# Patient Record
Sex: MALE | Race: WHITE | NOT HISPANIC OR LATINO | Employment: FULL TIME | URBAN - METROPOLITAN AREA
[De-identification: names, ages, dates, MRNs, and addresses within clinical notes are randomized per-mention and may not be internally consistent; named-entity substitution may affect disease eponyms.]

---

## 2024-01-01 ENCOUNTER — HOSPITAL ENCOUNTER (INPATIENT)
Facility: HOSPITAL | Age: 39
LOS: 2 days | DRG: 432 | End: 2024-11-23
Attending: INTERNAL MEDICINE | Admitting: INTERNAL MEDICINE
Payer: COMMERCIAL

## 2024-01-01 ENCOUNTER — HOSPITAL ENCOUNTER (INPATIENT)
Facility: HOSPITAL | Age: 39
LOS: 4 days | Discharge: OTHER INSTITUTION | DRG: 432 | End: 2024-11-21
Attending: EMERGENCY MEDICINE | Admitting: EMERGENCY MEDICINE
Payer: COMMERCIAL

## 2024-01-01 ENCOUNTER — DOCUMENTATION ONLY (OUTPATIENT)
Dept: CARDIOLOGY | Facility: CLINIC | Age: 39
End: 2024-01-01
Payer: COMMERCIAL

## 2024-01-01 VITALS
HEART RATE: 68 BPM | OXYGEN SATURATION: 92 % | RESPIRATION RATE: 53 BRPM | SYSTOLIC BLOOD PRESSURE: 129 MMHG | WEIGHT: 315 LBS | BODY MASS INDEX: 45.1 KG/M2 | DIASTOLIC BLOOD PRESSURE: 60 MMHG | HEIGHT: 70 IN | TEMPERATURE: 100 F

## 2024-01-01 VITALS
WEIGHT: 315 LBS | RESPIRATION RATE: 32 BRPM | DIASTOLIC BLOOD PRESSURE: 67 MMHG | TEMPERATURE: 99 F | HEIGHT: 70 IN | HEART RATE: 109 BPM | SYSTOLIC BLOOD PRESSURE: 146 MMHG | BODY MASS INDEX: 45.1 KG/M2 | OXYGEN SATURATION: 97 %

## 2024-01-01 DIAGNOSIS — N17.9 AKI (ACUTE KIDNEY INJURY): ICD-10-CM

## 2024-01-01 DIAGNOSIS — K72.90 LIVER FAILURE: ICD-10-CM

## 2024-01-01 DIAGNOSIS — R07.9 CHEST PAIN: ICD-10-CM

## 2024-01-01 DIAGNOSIS — K74.3 HEPATIC CIRRHOSIS DUE TO PRIMARY BILIARY CHOLANGITIS: Chronic | ICD-10-CM

## 2024-01-01 DIAGNOSIS — K70.30 ALCOHOLIC CIRRHOSIS: ICD-10-CM

## 2024-01-01 DIAGNOSIS — R79.89 ELEVATED BRAIN NATRIURETIC PEPTIDE (BNP) LEVEL: ICD-10-CM

## 2024-01-01 DIAGNOSIS — K74.3 HEPATIC CIRRHOSIS DUE TO PRIMARY BILIARY CHOLANGITIS: Primary | Chronic | ICD-10-CM

## 2024-01-01 DIAGNOSIS — K76.82 ACUTE HEPATIC ENCEPHALOPATHY: Primary | ICD-10-CM

## 2024-01-01 LAB
ABO + RH BLD: NORMAL
ADENOVIRUS: NOT DETECTED
ALBUMIN SERPL BCP-MCNC: 1.9 G/DL (ref 3.5–5.2)
ALBUMIN SERPL BCP-MCNC: 2 G/DL (ref 3.5–5.2)
ALBUMIN SERPL BCP-MCNC: 2 G/DL (ref 3.5–5.2)
ALBUMIN SERPL BCP-MCNC: 2.1 G/DL (ref 3.5–5.2)
ALBUMIN SERPL BCP-MCNC: 2.1 G/DL (ref 3.5–5.2)
ALBUMIN SERPL BCP-MCNC: 2.2 G/DL (ref 3.5–5.2)
ALBUMIN SERPL BCP-MCNC: 2.2 G/DL (ref 3.5–5.2)
ALBUMIN SERPL BCP-MCNC: 2.3 G/DL (ref 3.5–5.2)
ALLENS TEST: ABNORMAL
ALP SERPL-CCNC: 187 U/L (ref 40–150)
ALP SERPL-CCNC: 204 U/L (ref 40–150)
ALP SERPL-CCNC: 211 U/L (ref 40–150)
ALP SERPL-CCNC: 212 U/L (ref 40–150)
ALP SERPL-CCNC: 215 U/L (ref 40–150)
ALP SERPL-CCNC: 216 U/L (ref 40–150)
ALP SERPL-CCNC: 227 U/L (ref 40–150)
ALT SERPL W/O P-5'-P-CCNC: 104 U/L (ref 10–44)
ALT SERPL W/O P-5'-P-CCNC: 440 U/L (ref 10–44)
ALT SERPL W/O P-5'-P-CCNC: 55 U/L (ref 10–44)
ALT SERPL W/O P-5'-P-CCNC: 59 U/L (ref 10–44)
ALT SERPL W/O P-5'-P-CCNC: 62 U/L (ref 10–44)
ALT SERPL W/O P-5'-P-CCNC: 63 U/L (ref 10–44)
ALT SERPL W/O P-5'-P-CCNC: 64 U/L (ref 10–44)
AMMONIA PLAS-SCNC: 106 UMOL/L (ref 10–50)
AMMONIA PLAS-SCNC: 57 UMOL/L (ref 10–50)
AMMONIA PLAS-SCNC: 57 UMOL/L (ref 10–50)
AMMONIA PLAS-SCNC: 83 UMOL/L (ref 10–50)
ANION GAP SERPL CALC-SCNC: 12 MMOL/L (ref 8–16)
ANION GAP SERPL CALC-SCNC: 13 MMOL/L (ref 8–16)
ANION GAP SERPL CALC-SCNC: 14 MMOL/L (ref 8–16)
ANION GAP SERPL CALC-SCNC: 14 MMOL/L (ref 8–16)
ANION GAP SERPL CALC-SCNC: 15 MMOL/L (ref 8–16)
ANION GAP SERPL CALC-SCNC: 15 MMOL/L (ref 8–16)
ANION GAP SERPL CALC-SCNC: 16 MMOL/L (ref 8–16)
ANION GAP SERPL CALC-SCNC: 16 MMOL/L (ref 8–16)
ANISOCYTOSIS BLD QL SMEAR: SLIGHT
AORTIC ROOT ANNULUS: 3.57 CM
APTT PPP: 39.3 SEC (ref 21–32)
ASCENDING AORTA: 3.12 CM
AST SERPL-CCNC: 133 U/L (ref 10–40)
AST SERPL-CCNC: 154 U/L (ref 10–40)
AST SERPL-CCNC: 155 U/L (ref 10–40)
AST SERPL-CCNC: 158 U/L (ref 10–40)
AST SERPL-CCNC: 158 U/L (ref 10–40)
AST SERPL-CCNC: 2137 U/L (ref 10–40)
AST SERPL-CCNC: 314 U/L (ref 10–40)
AV INDEX (PROSTH): 0.88
AV MEAN GRADIENT: 11.4 MMHG
AV PEAK GRADIENT: 19.4 MMHG
AV VALVE AREA BY VELOCITY RATIO: 2.7 CM²
AV VALVE AREA: 2.8 CM²
AV VELOCITY RATIO: 0.86
BACTERIA #/AREA URNS AUTO: ABNORMAL /HPF
BACTERIA #/AREA URNS HPF: ABNORMAL /HPF
BASO STIPL BLD QL SMEAR: ABNORMAL
BASOPHILS # BLD AUTO: 0.02 K/UL (ref 0–0.2)
BASOPHILS # BLD AUTO: 0.02 K/UL (ref 0–0.2)
BASOPHILS # BLD AUTO: 0.03 K/UL (ref 0–0.2)
BASOPHILS # BLD AUTO: 0.05 K/UL (ref 0–0.2)
BASOPHILS # BLD AUTO: 0.05 K/UL (ref 0–0.2)
BASOPHILS NFR BLD: 0 % (ref 0–1.9)
BASOPHILS NFR BLD: 0 % (ref 0–1.9)
BASOPHILS NFR BLD: 0.2 % (ref 0–1.9)
BASOPHILS NFR BLD: 0.3 % (ref 0–1.9)
BASOPHILS NFR BLD: 0.4 % (ref 0–1.9)
BASOPHILS NFR BLD: 0.5 % (ref 0–1.9)
BASOPHILS NFR BLD: 0.5 % (ref 0–1.9)
BILIRUB DIRECT SERPL-MCNC: >14 MG/DL (ref 0.1–0.3)
BILIRUB SERPL-MCNC: 19.8 MG/DL (ref 0.1–1)
BILIRUB SERPL-MCNC: 21.3 MG/DL (ref 0.1–1)
BILIRUB SERPL-MCNC: 21.5 MG/DL (ref 0.1–1)
BILIRUB SERPL-MCNC: 22.7 MG/DL (ref 0.1–1)
BILIRUB SERPL-MCNC: 23.8 MG/DL (ref 0.1–1)
BILIRUB SERPL-MCNC: 24.3 MG/DL (ref 0.1–1)
BILIRUB SERPL-MCNC: 24.4 MG/DL (ref 0.1–1)
BILIRUB UR QL STRIP: ABNORMAL
BILIRUB UR QL STRIP: ABNORMAL
BLD GP AB SCN CELLS X3 SERPL QL: NORMAL
BNP SERPL-MCNC: 771 PG/ML (ref 0–99)
BORDETELLA PARAPERTUSSIS (IS1001): NOT DETECTED
BORDETELLA PERTUSSIS (PTXP): NOT DETECTED
BSA FOR ECHO PROCEDURE: 2.92 M2
BUN SERPL-MCNC: 12 MG/DL (ref 6–20)
BUN SERPL-MCNC: 13 MG/DL (ref 6–20)
BUN SERPL-MCNC: 19 MG/DL (ref 6–20)
BUN SERPL-MCNC: 23 MG/DL (ref 6–20)
BUN SERPL-MCNC: 24 MG/DL (ref 6–20)
BUN SERPL-MCNC: 28 MG/DL (ref 6–20)
BUN SERPL-MCNC: 32 MG/DL (ref 6–20)
BUN SERPL-MCNC: 38 MG/DL (ref 6–20)
BURR CELLS BLD QL SMEAR: ABNORMAL
BURR CELLS BLD QL SMEAR: ABNORMAL
CALCIUM SERPL-MCNC: 7.6 MG/DL (ref 8.7–10.5)
CALCIUM SERPL-MCNC: 8.3 MG/DL (ref 8.7–10.5)
CALCIUM SERPL-MCNC: 8.3 MG/DL (ref 8.7–10.5)
CALCIUM SERPL-MCNC: 8.4 MG/DL (ref 8.7–10.5)
CALCIUM SERPL-MCNC: 8.5 MG/DL (ref 8.7–10.5)
CALCIUM SERPL-MCNC: 8.6 MG/DL (ref 8.7–10.5)
CHLAMYDIA PNEUMONIAE: NOT DETECTED
CHLORIDE SERPL-SCNC: 82 MMOL/L (ref 95–110)
CHLORIDE SERPL-SCNC: 83 MMOL/L (ref 95–110)
CHLORIDE SERPL-SCNC: 85 MMOL/L (ref 95–110)
CK SERPL-CCNC: 312 U/L (ref 20–200)
CLARITY UR REFRACT.AUTO: ABNORMAL
CLARITY UR: ABNORMAL
CO2 SERPL-SCNC: 20 MMOL/L (ref 23–29)
CO2 SERPL-SCNC: 23 MMOL/L (ref 23–29)
CO2 SERPL-SCNC: 24 MMOL/L (ref 23–29)
CO2 SERPL-SCNC: 24 MMOL/L (ref 23–29)
CO2 SERPL-SCNC: 25 MMOL/L (ref 23–29)
CO2 SERPL-SCNC: 25 MMOL/L (ref 23–29)
CO2 SERPL-SCNC: 26 MMOL/L (ref 23–29)
CO2 SERPL-SCNC: 27 MMOL/L (ref 23–29)
COLOR UR AUTO: ABNORMAL
COLOR UR: YELLOW
CORONAVIRUS 229E, COMMON COLD VIRUS: NOT DETECTED
CORONAVIRUS HKU1, COMMON COLD VIRUS: NOT DETECTED
CORONAVIRUS NL63, COMMON COLD VIRUS: NOT DETECTED
CORONAVIRUS OC43, COMMON COLD VIRUS: NOT DETECTED
CREAT SERPL-MCNC: 1.2 MG/DL (ref 0.5–1.4)
CREAT SERPL-MCNC: 1.2 MG/DL (ref 0.5–1.4)
CREAT SERPL-MCNC: 1.5 MG/DL (ref 0.5–1.4)
CREAT SERPL-MCNC: 1.9 MG/DL (ref 0.5–1.4)
CREAT SERPL-MCNC: 2.1 MG/DL (ref 0.5–1.4)
CREAT SERPL-MCNC: 2.6 MG/DL (ref 0.5–1.4)
CREAT SERPL-MCNC: 3.6 MG/DL (ref 0.5–1.4)
CREAT SERPL-MCNC: 6.3 MG/DL (ref 0.5–1.4)
CV ECHO LV RWT: 0.52 CM
DELSYS: ABNORMAL
DIFFERENTIAL METHOD BLD: ABNORMAL
DOP CALC AO PEAK VEL: 2.2 M/S
DOP CALC AO VTI: 39.6 CM
DOP CALC LVOT AREA: 3.1 CM2
DOP CALC LVOT DIAMETER: 2 CM
DOP CALC LVOT PEAK VEL: 1.9 M/S
DOP CALC LVOT STROKE VOLUME: 109.3 CM3
DOP CALC RVOT PEAK VEL: 1.35 M/S
DOP CALC RVOT VTI: 23.4 CM
DOP CALCLVOT PEAK VEL VTI: 34.8 CM
E WAVE DECELERATION TIME: 219.92 MSEC
E/A RATIO: 1.7
E/E' RATIO: 7.93 M/S
ECHO LV POSTERIOR WALL: 1.4 CM (ref 0.6–1.1)
EJECTION FRACTION: 65 %
EOSINOPHIL # BLD AUTO: 0.1 K/UL (ref 0–0.5)
EOSINOPHIL # BLD AUTO: 0.1 K/UL (ref 0–0.5)
EOSINOPHIL # BLD AUTO: 0.2 K/UL (ref 0–0.5)
EOSINOPHIL # BLD AUTO: 0.3 K/UL (ref 0–0.5)
EOSINOPHIL # BLD AUTO: 0.4 K/UL (ref 0–0.5)
EOSINOPHIL NFR BLD: 1 % (ref 0–8)
EOSINOPHIL NFR BLD: 1.5 % (ref 0–8)
EOSINOPHIL NFR BLD: 2.3 % (ref 0–8)
EOSINOPHIL NFR BLD: 3 % (ref 0–8)
EOSINOPHIL NFR BLD: 3 % (ref 0–8)
EOSINOPHIL NFR BLD: 3.2 % (ref 0–8)
EOSINOPHIL NFR BLD: 3.5 % (ref 0–8)
ERYTHROCYTE [DISTWIDTH] IN BLOOD BY AUTOMATED COUNT: 24 % (ref 11.5–14.5)
ERYTHROCYTE [DISTWIDTH] IN BLOOD BY AUTOMATED COUNT: 24.1 % (ref 11.5–14.5)
ERYTHROCYTE [DISTWIDTH] IN BLOOD BY AUTOMATED COUNT: 24.6 % (ref 11.5–14.5)
ERYTHROCYTE [DISTWIDTH] IN BLOOD BY AUTOMATED COUNT: 25.4 % (ref 11.5–14.5)
ERYTHROCYTE [DISTWIDTH] IN BLOOD BY AUTOMATED COUNT: 26.1 % (ref 11.5–14.5)
ERYTHROCYTE [DISTWIDTH] IN BLOOD BY AUTOMATED COUNT: 26.6 % (ref 11.5–14.5)
ERYTHROCYTE [DISTWIDTH] IN BLOOD BY AUTOMATED COUNT: 27 % (ref 11.5–14.5)
EST. GFR  (NO RACE VARIABLE): 10.9 ML/MIN/1.73 M^2
EST. GFR  (NO RACE VARIABLE): 21.4 ML/MIN/1.73 M^2
EST. GFR  (NO RACE VARIABLE): 31.6 ML/MIN/1.73 M^2
EST. GFR  (NO RACE VARIABLE): 41 ML/MIN/1.73 M^2
EST. GFR  (NO RACE VARIABLE): 46 ML/MIN/1.73 M^2
EST. GFR  (NO RACE VARIABLE): >60 ML/MIN/1.73 M^2
FIO2: 50
FIO2: 60
FIO2: 60
FLOW: 15
FLOW: 20
FLOW: 20
FLUBV RNA NPH QL NAA+NON-PROBE: NOT DETECTED
FRACTIONAL SHORTENING: 38.9 % (ref 28–44)
GIANT PLATELETS BLD QL SMEAR: PRESENT
GIANT PLATELETS BLD QL SMEAR: PRESENT
GLUCOSE SERPL-MCNC: 100 MG/DL (ref 70–110)
GLUCOSE SERPL-MCNC: 103 MG/DL (ref 70–110)
GLUCOSE SERPL-MCNC: 108 MG/DL (ref 70–110)
GLUCOSE SERPL-MCNC: 120 MG/DL (ref 70–110)
GLUCOSE SERPL-MCNC: 121 MG/DL (ref 70–110)
GLUCOSE SERPL-MCNC: 164 MG/DL (ref 70–110)
GLUCOSE SERPL-MCNC: 166 MG/DL (ref 70–110)
GLUCOSE SERPL-MCNC: 51 MG/DL (ref 70–110)
GLUCOSE SERPL-MCNC: 83 MG/DL (ref 70–110)
GLUCOSE UR QL STRIP: NEGATIVE
GLUCOSE UR QL STRIP: NEGATIVE
HAV IGM SERPL QL IA: NORMAL
HBV CORE IGM SERPL QL IA: NORMAL
HBV SURFACE AG SERPL QL IA: NORMAL
HCO3 UR-SCNC: 29 MMOL/L (ref 24–28)
HCO3 UR-SCNC: 29.1 MMOL/L (ref 24–28)
HCO3 UR-SCNC: 30.3 MMOL/L (ref 24–28)
HCO3 UR-SCNC: 31.9 MMOL/L (ref 24–28)
HCT VFR BLD AUTO: 24.4 % (ref 40–54)
HCT VFR BLD AUTO: 25.8 % (ref 40–54)
HCT VFR BLD AUTO: 26.5 % (ref 40–54)
HCT VFR BLD AUTO: 26.9 % (ref 40–54)
HCT VFR BLD AUTO: 28.1 % (ref 40–54)
HCT VFR BLD AUTO: 28.2 % (ref 40–54)
HCT VFR BLD AUTO: 28.6 % (ref 40–54)
HCT VFR BLD CALC: 31 %PCV (ref 36–54)
HCV AB SERPL QL IA: NORMAL
HGB BLD-MCNC: 10.1 G/DL (ref 14–18)
HGB BLD-MCNC: 8.9 G/DL (ref 14–18)
HGB BLD-MCNC: 9.1 G/DL (ref 14–18)
HGB BLD-MCNC: 9.3 G/DL (ref 14–18)
HGB BLD-MCNC: 9.6 G/DL (ref 14–18)
HGB BLD-MCNC: 9.6 G/DL (ref 14–18)
HGB BLD-MCNC: 9.9 G/DL (ref 14–18)
HGB UR QL STRIP: ABNORMAL
HGB UR QL STRIP: ABNORMAL
HPIV1 RNA NPH QL NAA+NON-PROBE: NOT DETECTED
HPIV2 RNA NPH QL NAA+NON-PROBE: NOT DETECTED
HPIV3 RNA NPH QL NAA+NON-PROBE: NOT DETECTED
HPIV4 RNA NPH QL NAA+NON-PROBE: NOT DETECTED
HUMAN METAPNEUMOVIRUS: NOT DETECTED
HYALINE CASTS UR QL AUTO: 2 /LPF
HYPOCHROMIA BLD QL SMEAR: ABNORMAL
IMM GRANULOCYTES # BLD AUTO: 0.13 K/UL (ref 0–0.04)
IMM GRANULOCYTES # BLD AUTO: 0.33 K/UL (ref 0–0.04)
IMM GRANULOCYTES # BLD AUTO: 0.43 K/UL (ref 0–0.04)
IMM GRANULOCYTES # BLD AUTO: ABNORMAL K/UL (ref 0–0.04)
IMM GRANULOCYTES # BLD AUTO: ABNORMAL K/UL (ref 0–0.04)
IMM GRANULOCYTES NFR BLD AUTO: 1.5 % (ref 0–0.5)
IMM GRANULOCYTES NFR BLD AUTO: 1.5 % (ref 0–0.5)
IMM GRANULOCYTES NFR BLD AUTO: 1.8 % (ref 0–0.5)
IMM GRANULOCYTES NFR BLD AUTO: 3.5 % (ref 0–0.5)
IMM GRANULOCYTES NFR BLD AUTO: 4 % (ref 0–0.5)
IMM GRANULOCYTES NFR BLD AUTO: ABNORMAL % (ref 0–0.5)
IMM GRANULOCYTES NFR BLD AUTO: ABNORMAL % (ref 0–0.5)
INFLUENZA A (SUBTYPES H1,H1-2009,H3): NOT DETECTED
INR PPP: 1.8 (ref 0.8–1.2)
INR PPP: 1.9 (ref 0.8–1.2)
INR PPP: 1.9 (ref 0.8–1.2)
INR PPP: 2 (ref 0.8–1.2)
INR PPP: 2.4 (ref 0.8–1.2)
INTERVENTRICULAR SEPTUM: 1.3 CM (ref 0.6–1.1)
IVC DIAMETER: 1.52 CM
IVRT: 53.28 MSEC
KETONES UR QL STRIP: ABNORMAL
KETONES UR QL STRIP: NEGATIVE
LA MAJOR: 5.64 CM
LA MINOR: 5.36 CM
LA WIDTH: 3.7 CM
LACTATE SERPL-SCNC: 1 MMOL/L (ref 0.5–2.2)
LACTATE SERPL-SCNC: 1.3 MMOL/L (ref 0.5–2.2)
LACTATE SERPL-SCNC: 1.4 MMOL/L (ref 0.5–2.2)
LACTATE SERPL-SCNC: 2 MMOL/L (ref 0.5–2.2)
LEFT ATRIUM SIZE: 4.26 CM
LEFT ATRIUM VOLUME INDEX: 27.4 ML/M2
LEFT ATRIUM VOLUME: 73.64 CM3
LEFT INTERNAL DIMENSION IN SYSTOLE: 3.3 CM (ref 2.1–4)
LEFT VENTRICLE DIASTOLIC VOLUME INDEX: 53.02 ML/M2
LEFT VENTRICLE DIASTOLIC VOLUME: 142.62 ML
LEFT VENTRICLE MASS INDEX: 115.9 G/M2
LEFT VENTRICLE SYSTOLIC VOLUME INDEX: 16.2 ML/M2
LEFT VENTRICLE SYSTOLIC VOLUME: 43.5 ML
LEFT VENTRICULAR INTERNAL DIMENSION IN DIASTOLE: 5.4 CM (ref 3.5–6)
LEFT VENTRICULAR MASS: 311.7 G
LEUKOCYTE ESTERASE UR QL STRIP: ABNORMAL
LEUKOCYTE ESTERASE UR QL STRIP: NEGATIVE
LV LATERAL E/E' RATIO: 7.44 M/S
LV SEPTAL E/E' RATIO: 8.5 M/S
LVED V (TEICH): 142.62 ML
LVES V (TEICH): 43.5 ML
LVOT MG: 10.9 MMHG
LVOT MV: 1.63 CM/S
LYMPHOCYTES # BLD AUTO: 0.4 K/UL (ref 1–4.8)
LYMPHOCYTES # BLD AUTO: 0.5 K/UL (ref 1–4.8)
LYMPHOCYTES # BLD AUTO: 0.5 K/UL (ref 1–4.8)
LYMPHOCYTES # BLD AUTO: 0.6 K/UL (ref 1–4.8)
LYMPHOCYTES # BLD AUTO: 0.8 K/UL (ref 1–4.8)
LYMPHOCYTES NFR BLD: 10 % (ref 18–48)
LYMPHOCYTES NFR BLD: 4.4 % (ref 18–48)
LYMPHOCYTES NFR BLD: 5.1 % (ref 18–48)
LYMPHOCYTES NFR BLD: 5.6 % (ref 18–48)
LYMPHOCYTES NFR BLD: 6.9 % (ref 18–48)
LYMPHOCYTES NFR BLD: 8 % (ref 18–48)
LYMPHOCYTES NFR BLD: 9 % (ref 18–48)
MAGNESIUM SERPL-MCNC: 1.6 MG/DL (ref 1.6–2.6)
MAGNESIUM SERPL-MCNC: 1.8 MG/DL (ref 1.6–2.6)
MAGNESIUM SERPL-MCNC: 2 MG/DL (ref 1.6–2.6)
MAGNESIUM SERPL-MCNC: 2.1 MG/DL (ref 1.6–2.6)
MAGNESIUM SERPL-MCNC: 2.1 MG/DL (ref 1.6–2.6)
MCH RBC QN AUTO: 31.1 PG (ref 27–31)
MCH RBC QN AUTO: 31.5 PG (ref 27–31)
MCH RBC QN AUTO: 31.6 PG (ref 27–31)
MCH RBC QN AUTO: 32 PG (ref 27–31)
MCH RBC QN AUTO: 32.1 PG (ref 27–31)
MCH RBC QN AUTO: 32.5 PG (ref 27–31)
MCH RBC QN AUTO: 33.1 PG (ref 27–31)
MCHC RBC AUTO-ENTMCNC: 34.2 G/DL (ref 32–36)
MCHC RBC AUTO-ENTMCNC: 34.6 G/DL (ref 32–36)
MCHC RBC AUTO-ENTMCNC: 34.6 G/DL (ref 32–36)
MCHC RBC AUTO-ENTMCNC: 35.3 G/DL (ref 32–36)
MCHC RBC AUTO-ENTMCNC: 35.8 G/DL (ref 32–36)
MCHC RBC AUTO-ENTMCNC: 36.2 G/DL (ref 32–36)
MCHC RBC AUTO-ENTMCNC: 36.5 G/DL (ref 32–36)
MCV RBC AUTO: 87 FL (ref 82–98)
MCV RBC AUTO: 88 FL (ref 82–98)
MCV RBC AUTO: 88 FL (ref 82–98)
MCV RBC AUTO: 91 FL (ref 82–98)
MCV RBC AUTO: 92 FL (ref 82–98)
MCV RBC AUTO: 94 FL (ref 82–98)
MCV RBC AUTO: 94 FL (ref 82–98)
METAMYELOCYTES NFR BLD MANUAL: 1 %
MICROSCOPIC COMMENT: ABNORMAL
MICROSCOPIC COMMENT: ABNORMAL
MODE: ABNORMAL
MONOCYTES # BLD AUTO: 1.2 K/UL (ref 0.3–1)
MONOCYTES # BLD AUTO: 1.2 K/UL (ref 0.3–1)
MONOCYTES # BLD AUTO: 1.3 K/UL (ref 0.3–1)
MONOCYTES # BLD AUTO: 1.4 K/UL (ref 0.3–1)
MONOCYTES # BLD AUTO: 1.8 K/UL (ref 0.3–1)
MONOCYTES NFR BLD: 11 % (ref 4–15)
MONOCYTES NFR BLD: 13 % (ref 4–15)
MONOCYTES NFR BLD: 13 % (ref 4–15)
MONOCYTES NFR BLD: 14.1 % (ref 4–15)
MONOCYTES NFR BLD: 14.3 % (ref 4–15)
MONOCYTES NFR BLD: 18 % (ref 4–15)
MONOCYTES NFR BLD: 19.1 % (ref 4–15)
MRSA SCREEN BY PCR: NOT DETECTED
MV PEAK A VEL: 0.7 M/S
MV PEAK E VEL: 1.19 M/S
MV STENOSIS PRESSURE HALF TIME: 63.78 MS
MV VALVE AREA P 1/2 METHOD: 3.45 CM2
MYCOPLASMA PNEUMONIAE: NOT DETECTED
MYELOCYTES NFR BLD MANUAL: 3 %
MYELOCYTES NFR BLD MANUAL: 3 %
NEUTROPHILS # BLD AUTO: 5.2 K/UL (ref 1.8–7.7)
NEUTROPHILS # BLD AUTO: 6.2 K/UL (ref 1.8–7.7)
NEUTROPHILS # BLD AUTO: 6.6 K/UL (ref 1.8–7.7)
NEUTROPHILS # BLD AUTO: 6.8 K/UL (ref 1.8–7.7)
NEUTROPHILS # BLD AUTO: 8 K/UL (ref 1.8–7.7)
NEUTROPHILS NFR BLD: 65.7 % (ref 38–73)
NEUTROPHILS NFR BLD: 68 % (ref 38–73)
NEUTROPHILS NFR BLD: 70.7 % (ref 38–73)
NEUTROPHILS NFR BLD: 73 % (ref 38–73)
NEUTROPHILS NFR BLD: 73.9 % (ref 38–73)
NEUTROPHILS NFR BLD: 76.7 % (ref 38–73)
NEUTROPHILS NFR BLD: 78.8 % (ref 38–73)
NEUTS BAND NFR BLD MANUAL: 2 %
NITRITE UR QL STRIP: NEGATIVE
NITRITE UR QL STRIP: NEGATIVE
NRBC BLD-RTO: 0 /100 WBC
NRBC BLD-RTO: 0 /100 WBC
NRBC BLD-RTO: 1 /100 WBC
NRBC BLD-RTO: 2 /100 WBC
NRBC BLD-RTO: 4 /100 WBC
OSMOLALITY UR: 261 MOSM/KG (ref 50–1200)
PCO2 BLDA: 42.4 MMHG (ref 35–45)
PCO2 BLDA: 42.9 MMHG (ref 35–45)
PCO2 BLDA: 46.6 MMHG (ref 35–45)
PCO2 BLDA: 48.9 MMHG (ref 35–45)
PH SMN: 7.4 [PH] (ref 7.35–7.45)
PH SMN: 7.42 [PH] (ref 7.35–7.45)
PH SMN: 7.44 [PH] (ref 7.35–7.45)
PH SMN: 7.46 [PH] (ref 7.35–7.45)
PH UR STRIP: 6 [PH] (ref 5–8)
PH UR STRIP: 6 [PH] (ref 5–8)
PHOSPHATE SERPL-MCNC: 2.3 MG/DL (ref 2.7–4.5)
PHOSPHATE SERPL-MCNC: 2.6 MG/DL (ref 2.7–4.5)
PHOSPHATE SERPL-MCNC: 2.6 MG/DL (ref 2.7–4.5)
PHOSPHATE SERPL-MCNC: 3.7 MG/DL (ref 2.7–4.5)
PHOSPHATE SERPL-MCNC: 4.8 MG/DL (ref 2.7–4.5)
PHOSPHATE SERPL-MCNC: 6.2 MG/DL (ref 2.7–4.5)
PISA MRMAX VEL: 4.21 M/S
PISA TR MAX VEL: 2.66 M/S
PLATELET # BLD AUTO: 110 K/UL (ref 150–450)
PLATELET # BLD AUTO: 116 K/UL (ref 150–450)
PLATELET # BLD AUTO: 126 K/UL (ref 150–450)
PLATELET # BLD AUTO: 131 K/UL (ref 150–450)
PLATELET # BLD AUTO: 147 K/UL (ref 150–450)
PLATELET # BLD AUTO: 153 K/UL (ref 150–450)
PLATELET # BLD AUTO: 188 K/UL (ref 150–450)
PLATELET BLD QL SMEAR: ABNORMAL
PMV BLD AUTO: 10 FL (ref 9.2–12.9)
PMV BLD AUTO: 10.3 FL (ref 9.2–12.9)
PMV BLD AUTO: 10.3 FL (ref 9.2–12.9)
PMV BLD AUTO: 10.6 FL (ref 9.2–12.9)
PMV BLD AUTO: 11.8 FL (ref 9.2–12.9)
PMV BLD AUTO: 11.8 FL (ref 9.2–12.9)
PMV BLD AUTO: 9.8 FL (ref 9.2–12.9)
PO2 BLDA: 56 MMHG (ref 80–100)
PO2 BLDA: 74 MMHG (ref 80–100)
PO2 BLDA: 81 MMHG (ref 80–100)
PO2 BLDA: 84 MMHG (ref 80–100)
POC BE: 4 MMOL/L
POC BE: 5 MMOL/L
POC BE: 6 MMOL/L
POC BE: 7 MMOL/L
POC IONIZED CALCIUM: 1.09 MMOL/L (ref 1.06–1.42)
POC SATURATED O2: 89 % (ref 95–100)
POC SATURATED O2: 95 % (ref 95–100)
POC SATURATED O2: 96 % (ref 95–100)
POC SATURATED O2: 97 % (ref 95–100)
POC TCO2: 33 MMOL/L (ref 23–27)
POCT GLUCOSE: 104 MG/DL (ref 70–110)
POIKILOCYTOSIS BLD QL SMEAR: SLIGHT
POIKILOCYTOSIS BLD QL SMEAR: SLIGHT
POLYCHROMASIA BLD QL SMEAR: ABNORMAL
POTASSIUM BLD-SCNC: 3.5 MMOL/L (ref 3.5–5.1)
POTASSIUM SERPL-SCNC: 2.9 MMOL/L (ref 3.5–5.1)
POTASSIUM SERPL-SCNC: 3 MMOL/L (ref 3.5–5.1)
POTASSIUM SERPL-SCNC: 3.1 MMOL/L (ref 3.5–5.1)
POTASSIUM SERPL-SCNC: 3.2 MMOL/L (ref 3.5–5.1)
POTASSIUM SERPL-SCNC: 3.2 MMOL/L (ref 3.5–5.1)
POTASSIUM SERPL-SCNC: 3.4 MMOL/L (ref 3.5–5.1)
POTASSIUM SERPL-SCNC: 3.5 MMOL/L (ref 3.5–5.1)
POTASSIUM SERPL-SCNC: 3.8 MMOL/L (ref 3.5–5.1)
POTASSIUM SERPL-SCNC: 5.3 MMOL/L (ref 3.5–5.1)
POTASSIUM UR-SCNC: 30 MMOL/L (ref 15–95)
PROCALCITONIN SERPL IA-MCNC: 0.62 NG/ML
PROMYELOCYTES NFR BLD MANUAL: 1 %
PROT SERPL-MCNC: 6.3 G/DL (ref 6–8.4)
PROT SERPL-MCNC: 6.5 G/DL (ref 6–8.4)
PROT SERPL-MCNC: 6.7 G/DL (ref 6–8.4)
PROT SERPL-MCNC: 6.7 G/DL (ref 6–8.4)
PROT SERPL-MCNC: 6.8 G/DL (ref 6–8.4)
PROT SERPL-MCNC: 7 G/DL (ref 6–8.4)
PROT SERPL-MCNC: 7.2 G/DL (ref 6–8.4)
PROT UR QL STRIP: NEGATIVE
PROT UR QL STRIP: NEGATIVE
PROTHROMBIN TIME: 19.7 SEC (ref 9–12.5)
PROTHROMBIN TIME: 20 SEC (ref 9–12.5)
PROTHROMBIN TIME: 20.3 SEC (ref 9–12.5)
PROTHROMBIN TIME: 20.8 SEC (ref 9–12.5)
PROTHROMBIN TIME: 24.5 SEC (ref 9–12.5)
PV MEAN GRADIENT: 6 MMHG
RA MAJOR: 4.52 CM
RA PRESSURE ESTIMATED: 3 MMHG
RA WIDTH: 3.2 CM
RBC # BLD AUTO: 2.69 M/UL (ref 4.6–6.2)
RBC # BLD AUTO: 2.91 M/UL (ref 4.6–6.2)
RBC # BLD AUTO: 2.93 M/UL (ref 4.6–6.2)
RBC # BLD AUTO: 2.99 M/UL (ref 4.6–6.2)
RBC # BLD AUTO: 3.05 M/UL (ref 4.6–6.2)
RBC # BLD AUTO: 3.05 M/UL (ref 4.6–6.2)
RBC # BLD AUTO: 3.2 M/UL (ref 4.6–6.2)
RBC #/AREA URNS AUTO: >100 /HPF (ref 0–4)
RBC #/AREA URNS HPF: >100 /HPF (ref 0–4)
RESPIRATORY INFECTION PANEL SOURCE: NORMAL
RSV RNA NPH QL NAA+NON-PROBE: NOT DETECTED
RV TB RVSP: 6 MMHG
RV+EV RNA NPH QL NAA+NON-PROBE: NOT DETECTED
SAMPLE: ABNORMAL
SARS-COV-2 RNA RESP QL NAA+PROBE: NOT DETECTED
SITE: ABNORMAL
SODIUM BLD-SCNC: 120 MMOL/L (ref 136–145)
SODIUM SERPL-SCNC: 120 MMOL/L (ref 136–145)
SODIUM SERPL-SCNC: 120 MMOL/L (ref 136–145)
SODIUM SERPL-SCNC: 121 MMOL/L (ref 136–145)
SODIUM SERPL-SCNC: 122 MMOL/L (ref 136–145)
SODIUM SERPL-SCNC: 123 MMOL/L (ref 136–145)
SODIUM SERPL-SCNC: 124 MMOL/L (ref 136–145)
SODIUM UR-SCNC: <20 MMOL/L (ref 20–250)
SP GR UR STRIP: 1.01 (ref 1–1.03)
SP GR UR STRIP: 1.01 (ref 1–1.03)
SPECIMEN OUTDATE: NORMAL
SPHEROCYTES BLD QL SMEAR: ABNORMAL
STJ: 3.14 CM
TARGETS BLD QL SMEAR: ABNORMAL
TDI LATERAL: 0.16 M/S
TDI SEPTAL: 0.14 M/S
TDI: 0.15 M/S
TR MAX PG: 28 MMHG
TRICUSPID ANNULAR PLANE SYSTOLIC EXCURSION: 2.7 CM
TRIGL SERPL-MCNC: 342 MG/DL (ref 30–150)
TV REST PULMONARY ARTERY PRESSURE: 31 MMHG
UNIDENT CRYS URNS QL MICRO: ABNORMAL
URN SPEC COLLECT METH UR: ABNORMAL
URN SPEC COLLECT METH UR: ABNORMAL
UROBILINOGEN UR STRIP-ACNC: ABNORMAL EU/DL
UUN UR-MCNC: 176 MG/DL (ref 140–1050)
VANCOMYCIN SERPL-MCNC: 11.8 UG/ML
VANCOMYCIN SERPL-MCNC: 20.2 UG/ML
VANCOMYCIN SERPL-MCNC: 24.1 UG/ML
WBC # BLD AUTO: 10.77 K/UL (ref 3.9–12.7)
WBC # BLD AUTO: 11.95 K/UL (ref 3.9–12.7)
WBC # BLD AUTO: 17.13 K/UL (ref 3.9–12.7)
WBC # BLD AUTO: 7.29 K/UL (ref 3.9–12.7)
WBC # BLD AUTO: 8.56 K/UL (ref 3.9–12.7)
WBC # BLD AUTO: 8.64 K/UL (ref 3.9–12.7)
WBC # BLD AUTO: 9.38 K/UL (ref 3.9–12.7)
WBC #/AREA URNS AUTO: 2 /HPF (ref 0–5)
WBC #/AREA URNS HPF: 2 /HPF (ref 0–5)
Z-SCORE OF LEFT VENTRICULAR DIMENSION IN END DIASTOLE: -15.21
Z-SCORE OF LEFT VENTRICULAR DIMENSION IN END SYSTOLE: -11.62

## 2024-01-01 PROCEDURE — 87486 CHLMYD PNEUM DNA AMP PROBE: CPT | Performed by: NURSE PRACTITIONER

## 2024-01-01 PROCEDURE — 84100 ASSAY OF PHOSPHORUS: CPT | Performed by: INTERNAL MEDICINE

## 2024-01-01 PROCEDURE — 85610 PROTHROMBIN TIME: CPT | Performed by: NURSE PRACTITIONER

## 2024-01-01 PROCEDURE — 36620 INSERTION CATHETER ARTERY: CPT

## 2024-01-01 PROCEDURE — 63600175 PHARM REV CODE 636 W HCPCS: Mod: JG

## 2024-01-01 PROCEDURE — 84100 ASSAY OF PHOSPHORUS: CPT | Performed by: NURSE PRACTITIONER

## 2024-01-01 PROCEDURE — 63600175 PHARM REV CODE 636 W HCPCS

## 2024-01-01 PROCEDURE — 83605 ASSAY OF LACTIC ACID: CPT | Performed by: HOSPITALIST

## 2024-01-01 PROCEDURE — 25000003 PHARM REV CODE 250: Performed by: EMERGENCY MEDICINE

## 2024-01-01 PROCEDURE — 80202 ASSAY OF VANCOMYCIN: CPT | Performed by: INTERNAL MEDICINE

## 2024-01-01 PROCEDURE — 82803 BLOOD GASES ANY COMBINATION: CPT

## 2024-01-01 PROCEDURE — 94003 VENT MGMT INPAT SUBQ DAY: CPT

## 2024-01-01 PROCEDURE — 25000003 PHARM REV CODE 250

## 2024-01-01 PROCEDURE — 27100171 HC OXYGEN HIGH FLOW UP TO 24 HOURS

## 2024-01-01 PROCEDURE — 36415 COLL VENOUS BLD VENIPUNCTURE: CPT | Performed by: HOSPITALIST

## 2024-01-01 PROCEDURE — 85610 PROTHROMBIN TIME: CPT | Performed by: HOSPITALIST

## 2024-01-01 PROCEDURE — 63600175 PHARM REV CODE 636 W HCPCS: Performed by: HOSPITALIST

## 2024-01-01 PROCEDURE — 27000249 HC VAPOTHERM CIRCUIT

## 2024-01-01 PROCEDURE — 51702 INSERT TEMP BLADDER CATH: CPT

## 2024-01-01 PROCEDURE — C1751 CATH, INF, PER/CENT/MIDLINE: HCPCS

## 2024-01-01 PROCEDURE — 36415 COLL VENOUS BLD VENIPUNCTURE: CPT | Mod: XB

## 2024-01-01 PROCEDURE — 25000003 PHARM REV CODE 250: Performed by: NURSE PRACTITIONER

## 2024-01-01 PROCEDURE — 20000000 HC ICU ROOM

## 2024-01-01 PROCEDURE — 63600175 PHARM REV CODE 636 W HCPCS: Mod: JZ,JG | Performed by: INTERNAL MEDICINE

## 2024-01-01 PROCEDURE — 85007 BL SMEAR W/DIFF WBC COUNT: CPT | Performed by: NURSE PRACTITIONER

## 2024-01-01 PROCEDURE — 99233 SBSQ HOSP IP/OBS HIGH 50: CPT | Mod: ,,, | Performed by: STUDENT IN AN ORGANIZED HEALTH CARE EDUCATION/TRAINING PROGRAM

## 2024-01-01 PROCEDURE — 63600175 PHARM REV CODE 636 W HCPCS: Performed by: EMERGENCY MEDICINE

## 2024-01-01 PROCEDURE — 82140 ASSAY OF AMMONIA: CPT | Performed by: HOSPITALIST

## 2024-01-01 PROCEDURE — 5A1945Z RESPIRATORY VENTILATION, 24-96 CONSECUTIVE HOURS: ICD-10-PCS | Performed by: INTERNAL MEDICINE

## 2024-01-01 PROCEDURE — 63600175 PHARM REV CODE 636 W HCPCS: Performed by: STUDENT IN AN ORGANIZED HEALTH CARE EDUCATION/TRAINING PROGRAM

## 2024-01-01 PROCEDURE — 83735 ASSAY OF MAGNESIUM: CPT | Performed by: HOSPITALIST

## 2024-01-01 PROCEDURE — 99900035 HC TECH TIME PER 15 MIN (STAT)

## 2024-01-01 PROCEDURE — 83605 ASSAY OF LACTIC ACID: CPT | Mod: 91 | Performed by: EMERGENCY MEDICINE

## 2024-01-01 PROCEDURE — 92523 SPEECH SOUND LANG COMPREHEN: CPT

## 2024-01-01 PROCEDURE — P9047 ALBUMIN (HUMAN), 25%, 50ML: HCPCS | Mod: JZ,JG | Performed by: INTERNAL MEDICINE

## 2024-01-01 PROCEDURE — 85610 PROTHROMBIN TIME: CPT

## 2024-01-01 PROCEDURE — 83880 ASSAY OF NATRIURETIC PEPTIDE: CPT | Performed by: HOSPITALIST

## 2024-01-01 PROCEDURE — C9399 UNCLASSIFIED DRUGS OR BIOLOG: HCPCS

## 2024-01-01 PROCEDURE — 63600175 PHARM REV CODE 636 W HCPCS: Performed by: NURSE PRACTITIONER

## 2024-01-01 PROCEDURE — 63600175 PHARM REV CODE 636 W HCPCS: Performed by: INTERNAL MEDICINE

## 2024-01-01 PROCEDURE — 80053 COMPREHEN METABOLIC PANEL: CPT | Performed by: NURSE PRACTITIONER

## 2024-01-01 PROCEDURE — 82550 ASSAY OF CK (CPK): CPT | Performed by: NURSE PRACTITIONER

## 2024-01-01 PROCEDURE — 80074 ACUTE HEPATITIS PANEL: CPT

## 2024-01-01 PROCEDURE — 27000513 HC SENSOR FLOTRAC

## 2024-01-01 PROCEDURE — 25000003 PHARM REV CODE 250: Performed by: INTERNAL MEDICINE

## 2024-01-01 PROCEDURE — 84133 ASSAY OF URINE POTASSIUM: CPT | Performed by: INTERNAL MEDICINE

## 2024-01-01 PROCEDURE — 36600 WITHDRAWAL OF ARTERIAL BLOOD: CPT

## 2024-01-01 PROCEDURE — 36415 COLL VENOUS BLD VENIPUNCTURE: CPT | Performed by: INTERNAL MEDICINE

## 2024-01-01 PROCEDURE — 27000923 HC TRIALYSIS CATHETER, ANY SIZE

## 2024-01-01 PROCEDURE — 80053 COMPREHEN METABOLIC PANEL: CPT | Performed by: HOSPITALIST

## 2024-01-01 PROCEDURE — 27200966 HC CLOSED SUCTION SYSTEM

## 2024-01-01 PROCEDURE — 83735 ASSAY OF MAGNESIUM: CPT | Performed by: INTERNAL MEDICINE

## 2024-01-01 PROCEDURE — 94761 N-INVAS EAR/PLS OXIMETRY MLT: CPT

## 2024-01-01 PROCEDURE — 87040 BLOOD CULTURE FOR BACTERIA: CPT | Mod: 59 | Performed by: INTERNAL MEDICINE

## 2024-01-01 PROCEDURE — 76937 US GUIDE VASCULAR ACCESS: CPT

## 2024-01-01 PROCEDURE — 11000001 HC ACUTE MED/SURG PRIVATE ROOM

## 2024-01-01 PROCEDURE — 82800 BLOOD PH: CPT

## 2024-01-01 PROCEDURE — 84132 ASSAY OF SERUM POTASSIUM: CPT | Performed by: INTERNAL MEDICINE

## 2024-01-01 PROCEDURE — 84295 ASSAY OF SERUM SODIUM: CPT

## 2024-01-01 PROCEDURE — 85610 PROTHROMBIN TIME: CPT | Performed by: INTERNAL MEDICINE

## 2024-01-01 PROCEDURE — 83935 ASSAY OF URINE OSMOLALITY: CPT | Performed by: INTERNAL MEDICINE

## 2024-01-01 PROCEDURE — 5A1935Z RESPIRATORY VENTILATION, LESS THAN 24 CONSECUTIVE HOURS: ICD-10-PCS | Performed by: INTERNAL MEDICINE

## 2024-01-01 PROCEDURE — 82140 ASSAY OF AMMONIA: CPT | Performed by: NURSE PRACTITIONER

## 2024-01-01 PROCEDURE — 83735 ASSAY OF MAGNESIUM: CPT | Performed by: NURSE PRACTITIONER

## 2024-01-01 PROCEDURE — 85027 COMPLETE CBC AUTOMATED: CPT | Performed by: NURSE PRACTITIONER

## 2024-01-01 PROCEDURE — 85025 COMPLETE CBC W/AUTO DIFF WBC: CPT | Performed by: NURSE PRACTITIONER

## 2024-01-01 PROCEDURE — G0427 INPT/ED TELECONSULT70: HCPCS | Mod: GT,,, | Performed by: INTERNAL MEDICINE

## 2024-01-01 PROCEDURE — G0408 INPT/TELE FOLLOW UP 35: HCPCS | Mod: GT,,, | Performed by: INTERNAL MEDICINE

## 2024-01-01 PROCEDURE — 36415 COLL VENOUS BLD VENIPUNCTURE: CPT | Mod: XB | Performed by: EMERGENCY MEDICINE

## 2024-01-01 PROCEDURE — 99900026 HC AIRWAY MAINTENANCE (STAT)

## 2024-01-01 PROCEDURE — 25000003 PHARM REV CODE 250: Performed by: HOSPITALIST

## 2024-01-01 PROCEDURE — 99223 1ST HOSP IP/OBS HIGH 75: CPT | Mod: ,,, | Performed by: INTERNAL MEDICINE

## 2024-01-01 PROCEDURE — 84300 ASSAY OF URINE SODIUM: CPT | Performed by: INTERNAL MEDICINE

## 2024-01-01 PROCEDURE — 83605 ASSAY OF LACTIC ACID: CPT | Performed by: NURSE PRACTITIONER

## 2024-01-01 PROCEDURE — 85025 COMPLETE CBC W/AUTO DIFF WBC: CPT | Performed by: HOSPITALIST

## 2024-01-01 PROCEDURE — 84478 ASSAY OF TRIGLYCERIDES: CPT | Performed by: INTERNAL MEDICINE

## 2024-01-01 PROCEDURE — A4216 STERILE WATER/SALINE, 10 ML: HCPCS | Performed by: HOSPITALIST

## 2024-01-01 PROCEDURE — 80069 RENAL FUNCTION PANEL: CPT | Performed by: INTERNAL MEDICINE

## 2024-01-01 PROCEDURE — 86850 RBC ANTIBODY SCREEN: CPT | Performed by: NURSE PRACTITIONER

## 2024-01-01 PROCEDURE — 84540 ASSAY OF URINE/UREA-N: CPT

## 2024-01-01 PROCEDURE — 81001 URINALYSIS AUTO W/SCOPE: CPT | Performed by: NURSE PRACTITIONER

## 2024-01-01 PROCEDURE — 85014 HEMATOCRIT: CPT

## 2024-01-01 PROCEDURE — 87641 MR-STAPH DNA AMP PROBE: CPT | Performed by: STUDENT IN AN ORGANIZED HEALTH CARE EDUCATION/TRAINING PROGRAM

## 2024-01-01 PROCEDURE — 25000242 PHARM REV CODE 250 ALT 637 W/ HCPCS: Performed by: INTERNAL MEDICINE

## 2024-01-01 PROCEDURE — 82248 BILIRUBIN DIRECT: CPT | Performed by: HOSPITALIST

## 2024-01-01 PROCEDURE — 85730 THROMBOPLASTIN TIME PARTIAL: CPT | Performed by: HOSPITALIST

## 2024-01-01 PROCEDURE — 94640 AIRWAY INHALATION TREATMENT: CPT

## 2024-01-01 PROCEDURE — 99291 CRITICAL CARE FIRST HOUR: CPT | Mod: ,,, | Performed by: NURSE PRACTITIONER

## 2024-01-01 PROCEDURE — 82330 ASSAY OF CALCIUM: CPT

## 2024-01-01 PROCEDURE — 99223 1ST HOSP IP/OBS HIGH 75: CPT | Mod: 25,,, | Performed by: STUDENT IN AN ORGANIZED HEALTH CARE EDUCATION/TRAINING PROGRAM

## 2024-01-01 PROCEDURE — 63600175 PHARM REV CODE 636 W HCPCS: Mod: JB | Performed by: INTERNAL MEDICINE

## 2024-01-01 PROCEDURE — 36410 VNPNXR 3YR/> PHY/QHP DX/THER: CPT

## 2024-01-01 PROCEDURE — 80053 COMPREHEN METABOLIC PANEL: CPT | Performed by: INTERNAL MEDICINE

## 2024-01-01 PROCEDURE — 84145 PROCALCITONIN (PCT): CPT | Performed by: HOSPITALIST

## 2024-01-01 PROCEDURE — 94002 VENT MGMT INPAT INIT DAY: CPT

## 2024-01-01 PROCEDURE — 0BH18EZ INSERTION OF ENDOTRACHEAL AIRWAY INTO TRACHEA, VIA NATURAL OR ARTIFICIAL OPENING ENDOSCOPIC: ICD-10-PCS | Performed by: INTERNAL MEDICINE

## 2024-01-01 PROCEDURE — 82565 ASSAY OF CREATININE: CPT

## 2024-01-01 PROCEDURE — 81000 URINALYSIS NONAUTO W/SCOPE: CPT | Performed by: INTERNAL MEDICINE

## 2024-01-01 PROCEDURE — 84132 ASSAY OF SERUM POTASSIUM: CPT

## 2024-01-01 PROCEDURE — 85025 COMPLETE CBC W/AUTO DIFF WBC: CPT

## 2024-01-01 PROCEDURE — 0BH17EZ INSERTION OF ENDOTRACHEAL AIRWAY INTO TRACHEA, VIA NATURAL OR ARTIFICIAL OPENING: ICD-10-PCS | Performed by: INTERNAL MEDICINE

## 2024-01-01 PROCEDURE — 92610 EVALUATE SWALLOWING FUNCTION: CPT

## 2024-01-01 PROCEDURE — 31500 INSERT EMERGENCY AIRWAY: CPT

## 2024-01-01 PROCEDURE — 82140 ASSAY OF AMMONIA: CPT | Performed by: INTERNAL MEDICINE

## 2024-01-01 PROCEDURE — P9045 ALBUMIN (HUMAN), 5%, 250 ML: HCPCS | Mod: JZ,JG | Performed by: INTERNAL MEDICINE

## 2024-01-01 PROCEDURE — 02HV33Z INSERTION OF INFUSION DEVICE INTO SUPERIOR VENA CAVA, PERCUTANEOUS APPROACH: ICD-10-PCS | Performed by: INTERNAL MEDICINE

## 2024-01-01 PROCEDURE — 94799 UNLISTED PULMONARY SVC/PX: CPT

## 2024-01-01 PROCEDURE — 80321 ALCOHOLS BIOMARKERS 1OR 2: CPT | Performed by: STUDENT IN AN ORGANIZED HEALTH CARE EDUCATION/TRAINING PROGRAM

## 2024-01-01 RX ORDER — OCTREOTIDE ACETATE 100 UG/ML
100 INJECTION, SOLUTION INTRAVENOUS; SUBCUTANEOUS EVERY 8 HOURS
Status: DISCONTINUED | OUTPATIENT
Start: 2024-01-01 | End: 2024-01-01 | Stop reason: HOSPADM

## 2024-01-01 RX ORDER — IBUPROFEN 200 MG
16 TABLET ORAL
Status: DISCONTINUED | OUTPATIENT
Start: 2024-01-01 | End: 2024-01-01 | Stop reason: HOSPADM

## 2024-01-01 RX ORDER — MUPIROCIN 20 MG/G
OINTMENT TOPICAL 2 TIMES DAILY
Status: DISCONTINUED | OUTPATIENT
Start: 2024-01-01 | End: 2024-01-01 | Stop reason: HOSPADM

## 2024-01-01 RX ORDER — CYANOCOBALAMIN 1000 UG/ML
1000 INJECTION, SOLUTION INTRAMUSCULAR; SUBCUTANEOUS DAILY
Status: COMPLETED | OUTPATIENT
Start: 2024-01-01 | End: 2024-01-01

## 2024-01-01 RX ORDER — POTASSIUM CHLORIDE 20 MEQ/1
40 TABLET, EXTENDED RELEASE ORAL 2 TIMES DAILY
Status: DISPENSED | OUTPATIENT
Start: 2024-01-01 | End: 2024-01-01

## 2024-01-01 RX ORDER — LORAZEPAM 2 MG/ML
INJECTION INTRAMUSCULAR
Status: COMPLETED
Start: 2024-01-01 | End: 2024-01-01

## 2024-01-01 RX ORDER — FENTANYL CITRATE-0.9 % NACL/PF 10 MCG/ML
0-250 PLASTIC BAG, INJECTION (ML) INTRAVENOUS CONTINUOUS
Status: DISCONTINUED | OUTPATIENT
Start: 2024-01-01 | End: 2024-01-01 | Stop reason: HOSPADM

## 2024-01-01 RX ORDER — POTASSIUM CHLORIDE 7.45 MG/ML
10 INJECTION INTRAVENOUS
Status: COMPLETED | OUTPATIENT
Start: 2024-01-01 | End: 2024-01-01

## 2024-01-01 RX ORDER — LACTULOSE 10 G/15ML
20 SOLUTION ORAL EVERY 6 HOURS
Status: DISCONTINUED | OUTPATIENT
Start: 2024-01-01 | End: 2024-01-01 | Stop reason: HOSPADM

## 2024-01-01 RX ORDER — POTASSIUM CHLORIDE 29.8 MG/ML
40 INJECTION INTRAVENOUS ONCE
Status: COMPLETED | OUTPATIENT
Start: 2024-01-01 | End: 2024-01-01

## 2024-01-01 RX ORDER — NOREPINEPHRINE BITARTRATE 0.02 MG/ML
0-.2 INJECTION, SOLUTION INTRAVENOUS CONTINUOUS
Status: DISCONTINUED | OUTPATIENT
Start: 2024-01-01 | End: 2024-01-01 | Stop reason: HOSPADM

## 2024-01-01 RX ORDER — TALC
6 POWDER (GRAM) TOPICAL NIGHTLY PRN
Status: DISCONTINUED | OUTPATIENT
Start: 2024-01-01 | End: 2024-01-01 | Stop reason: HOSPADM

## 2024-01-01 RX ORDER — PANTOPRAZOLE SODIUM 40 MG/1
40 TABLET, DELAYED RELEASE ORAL DAILY
Status: DISCONTINUED | OUTPATIENT
Start: 2024-01-01 | End: 2024-01-01 | Stop reason: HOSPADM

## 2024-01-01 RX ORDER — FENTANYL CITRATE-0.9 % NACL/PF 10 MCG/ML
0-300 PLASTIC BAG, INJECTION (ML) INTRAVENOUS CONTINUOUS
Status: DISCONTINUED | OUTPATIENT
Start: 2024-01-01 | End: 2024-01-01 | Stop reason: HOSPADM

## 2024-01-01 RX ORDER — PANTOPRAZOLE SODIUM 40 MG/10ML
40 INJECTION, POWDER, LYOPHILIZED, FOR SOLUTION INTRAVENOUS DAILY
Status: DISCONTINUED | OUTPATIENT
Start: 2024-01-01 | End: 2024-01-01 | Stop reason: HOSPADM

## 2024-01-01 RX ORDER — IPRATROPIUM BROMIDE AND ALBUTEROL SULFATE 2.5; .5 MG/3ML; MG/3ML
3 SOLUTION RESPIRATORY (INHALATION) EVERY 6 HOURS PRN
Status: DISCONTINUED | OUTPATIENT
Start: 2024-01-01 | End: 2024-01-01 | Stop reason: HOSPADM

## 2024-01-01 RX ORDER — ETOMIDATE 2 MG/ML
30 INJECTION INTRAVENOUS ONCE
Status: COMPLETED | OUTPATIENT
Start: 2024-01-01 | End: 2024-01-01

## 2024-01-01 RX ORDER — DEXMEDETOMIDINE HYDROCHLORIDE 4 UG/ML
0-1.4 INJECTION, SOLUTION INTRAVENOUS CONTINUOUS
Status: DISCONTINUED | OUTPATIENT
Start: 2024-01-01 | End: 2024-01-01

## 2024-01-01 RX ORDER — ROCURONIUM BROMIDE 10 MG/ML
INJECTION, SOLUTION INTRAVENOUS
Status: COMPLETED
Start: 2024-01-01 | End: 2024-01-01

## 2024-01-01 RX ORDER — ALUMINUM HYDROXIDE, MAGNESIUM HYDROXIDE, AND SIMETHICONE 1200; 120; 1200 MG/30ML; MG/30ML; MG/30ML
30 SUSPENSION ORAL 4 TIMES DAILY PRN
Status: DISCONTINUED | OUTPATIENT
Start: 2024-01-01 | End: 2024-01-01 | Stop reason: HOSPADM

## 2024-01-01 RX ORDER — CHLORHEXIDINE GLUCONATE ORAL RINSE 1.2 MG/ML
15 SOLUTION DENTAL 2 TIMES DAILY
Status: DISCONTINUED | OUTPATIENT
Start: 2024-01-01 | End: 2024-01-01 | Stop reason: HOSPADM

## 2024-01-01 RX ORDER — GLUCAGON 1 MG
1 KIT INJECTION
Status: DISCONTINUED | OUTPATIENT
Start: 2024-01-01 | End: 2024-01-01 | Stop reason: HOSPADM

## 2024-01-01 RX ORDER — MAGNESIUM SULFATE HEPTAHYDRATE 40 MG/ML
2 INJECTION, SOLUTION INTRAVENOUS ONCE
Status: COMPLETED | OUTPATIENT
Start: 2024-01-01 | End: 2024-01-01

## 2024-01-01 RX ORDER — ROCURONIUM BROMIDE 10 MG/ML
1 INJECTION, SOLUTION INTRAVENOUS ONCE
Status: COMPLETED | OUTPATIENT
Start: 2024-01-01 | End: 2024-01-01

## 2024-01-01 RX ORDER — ETOMIDATE 2 MG/ML
INJECTION INTRAVENOUS
Status: COMPLETED
Start: 2024-01-01 | End: 2024-01-01

## 2024-01-01 RX ORDER — ALBUMIN HUMAN 250 G/1000ML
25 SOLUTION INTRAVENOUS ONCE
Status: COMPLETED | OUTPATIENT
Start: 2024-01-01 | End: 2024-01-01

## 2024-01-01 RX ORDER — LORAZEPAM 2 MG/ML
2 INJECTION INTRAMUSCULAR
Status: DISCONTINUED | OUTPATIENT
Start: 2024-01-01 | End: 2024-01-01 | Stop reason: HOSPADM

## 2024-01-01 RX ORDER — URSODIOL 300 MG/1
600 CAPSULE ORAL 2 TIMES DAILY
Status: DISCONTINUED | OUTPATIENT
Start: 2024-01-01 | End: 2024-01-01 | Stop reason: HOSPADM

## 2024-01-01 RX ORDER — FUROSEMIDE 10 MG/ML
120 INJECTION INTRAMUSCULAR; INTRAVENOUS ONCE
Status: COMPLETED | OUTPATIENT
Start: 2024-01-01 | End: 2024-01-01

## 2024-01-01 RX ORDER — POTASSIUM CHLORIDE 20 MEQ/1
40 TABLET, EXTENDED RELEASE ORAL 3 TIMES DAILY
Status: DISCONTINUED | OUTPATIENT
Start: 2024-01-01 | End: 2024-01-01

## 2024-01-01 RX ORDER — ACETAMINOPHEN 650 MG/1
650 SUPPOSITORY RECTAL EVERY 6 HOURS PRN
Status: DISCONTINUED | OUTPATIENT
Start: 2024-01-01 | End: 2024-01-01

## 2024-01-01 RX ORDER — CEFTRIAXONE 1 G/1
1 INJECTION, POWDER, FOR SOLUTION INTRAMUSCULAR; INTRAVENOUS
Status: DISCONTINUED | OUTPATIENT
Start: 2024-01-01 | End: 2024-01-01

## 2024-01-01 RX ORDER — THIAMINE HCL 100 MG
100 TABLET ORAL DAILY
Status: DISCONTINUED | OUTPATIENT
Start: 2024-01-01 | End: 2024-01-01 | Stop reason: HOSPADM

## 2024-01-01 RX ORDER — ACETAMINOPHEN 500 MG
5000 TABLET ORAL DAILY
Status: DISCONTINUED | OUTPATIENT
Start: 2024-01-01 | End: 2024-01-01 | Stop reason: HOSPADM

## 2024-01-01 RX ORDER — DEXMEDETOMIDINE HYDROCHLORIDE 4 UG/ML
0-1.4 INJECTION, SOLUTION INTRAVENOUS CONTINUOUS
Status: DISCONTINUED | OUTPATIENT
Start: 2024-01-01 | End: 2024-01-01 | Stop reason: HOSPADM

## 2024-01-01 RX ORDER — DEXMEDETOMIDINE HYDROCHLORIDE 4 UG/ML
0-1.4 INJECTION INTRAVENOUS CONTINUOUS
Status: DISCONTINUED | OUTPATIENT
Start: 2024-01-01 | End: 2024-01-01 | Stop reason: HOSPADM

## 2024-01-01 RX ORDER — SODIUM CHLORIDE 0.9 % (FLUSH) 0.9 %
10 SYRINGE (ML) INJECTION EVERY 12 HOURS PRN
Status: DISCONTINUED | OUTPATIENT
Start: 2024-01-01 | End: 2024-01-01 | Stop reason: HOSPADM

## 2024-01-01 RX ORDER — NOREPINEPHRINE BIT/0.9 % NACL 32MG/250ML
0-3 PLASTIC BAG, INJECTION (ML) INTRAVENOUS CONTINUOUS
Status: DISCONTINUED | OUTPATIENT
Start: 2024-01-01 | End: 2024-01-01

## 2024-01-01 RX ORDER — ACETAMINOPHEN 325 MG/1
650 TABLET ORAL EVERY 8 HOURS PRN
Status: DISCONTINUED | OUTPATIENT
Start: 2024-01-01 | End: 2024-01-01 | Stop reason: HOSPADM

## 2024-01-01 RX ORDER — POTASSIUM CHLORIDE 20 MEQ/1
40 TABLET, EXTENDED RELEASE ORAL ONCE
Status: COMPLETED | OUTPATIENT
Start: 2024-01-01 | End: 2024-01-01

## 2024-01-01 RX ORDER — PANTOPRAZOLE SODIUM 40 MG/10ML
40 INJECTION, POWDER, LYOPHILIZED, FOR SOLUTION INTRAVENOUS DAILY
Status: DISCONTINUED | OUTPATIENT
Start: 2024-01-01 | End: 2024-01-01

## 2024-01-01 RX ORDER — SPIRONOLACTONE 25 MG/1
100 TABLET ORAL DAILY
Status: DISCONTINUED | OUTPATIENT
Start: 2024-01-01 | End: 2024-01-01 | Stop reason: HOSPADM

## 2024-01-01 RX ORDER — PROMETHAZINE HYDROCHLORIDE 25 MG/1
25 TABLET ORAL EVERY 6 HOURS PRN
Status: DISCONTINUED | OUTPATIENT
Start: 2024-01-01 | End: 2024-01-01 | Stop reason: HOSPADM

## 2024-01-01 RX ORDER — NALOXONE HCL 0.4 MG/ML
0.02 VIAL (ML) INJECTION
Status: DISCONTINUED | OUTPATIENT
Start: 2024-01-01 | End: 2024-01-01 | Stop reason: HOSPADM

## 2024-01-01 RX ORDER — HEPARIN SODIUM 5000 [USP'U]/ML
7500 INJECTION, SOLUTION INTRAVENOUS; SUBCUTANEOUS EVERY 8 HOURS
Status: DISCONTINUED | OUTPATIENT
Start: 2024-01-01 | End: 2024-01-01 | Stop reason: HOSPADM

## 2024-01-01 RX ORDER — MIDAZOLAM HYDROCHLORIDE 1 MG/ML
4 INJECTION, SOLUTION INTRAMUSCULAR; INTRAVENOUS ONCE
Status: COMPLETED | OUTPATIENT
Start: 2024-01-01 | End: 2024-01-01

## 2024-01-01 RX ORDER — PREDNISOLONE SODIUM PHOSPHATE 15 MG/5ML
40 SOLUTION ORAL DAILY
Status: DISCONTINUED | OUTPATIENT
Start: 2024-01-01 | End: 2024-01-01

## 2024-01-01 RX ORDER — IPRATROPIUM BROMIDE AND ALBUTEROL SULFATE 2.5; .5 MG/3ML; MG/3ML
3 SOLUTION RESPIRATORY (INHALATION) ONCE
Status: COMPLETED | OUTPATIENT
Start: 2024-01-01 | End: 2024-01-01

## 2024-01-01 RX ORDER — NOREPINEPHRINE BIT/0.9 % NACL 32MG/250ML
0-3 PLASTIC BAG, INJECTION (ML) INTRAVENOUS CONTINUOUS
Status: DISCONTINUED | OUTPATIENT
Start: 2024-01-01 | End: 2024-01-01 | Stop reason: HOSPADM

## 2024-01-01 RX ORDER — PROPOFOL 10 MG/ML
0-50 INJECTION, EMULSION INTRAVENOUS CONTINUOUS
Status: DISCONTINUED | OUTPATIENT
Start: 2024-01-01 | End: 2024-01-01

## 2024-01-01 RX ORDER — FENTANYL CITRATE 50 UG/ML
50 INJECTION, SOLUTION INTRAMUSCULAR; INTRAVENOUS
Status: DISCONTINUED | OUTPATIENT
Start: 2024-01-01 | End: 2024-01-01

## 2024-01-01 RX ORDER — AMOXICILLIN 250 MG
1 CAPSULE ORAL 2 TIMES DAILY PRN
Status: DISCONTINUED | OUTPATIENT
Start: 2024-01-01 | End: 2024-01-01 | Stop reason: HOSPADM

## 2024-01-01 RX ORDER — LORAZEPAM 2 MG/ML
INJECTION INTRAMUSCULAR
Status: DISCONTINUED
Start: 2024-01-01 | End: 2024-01-01 | Stop reason: HOSPADM

## 2024-01-01 RX ORDER — NOREPINEPHRINE BITARTRATE 0.02 MG/ML
0-3 INJECTION, SOLUTION INTRAVENOUS CONTINUOUS
Status: DISCONTINUED | OUTPATIENT
Start: 2024-01-01 | End: 2024-01-01

## 2024-01-01 RX ORDER — MIDAZOLAM HYDROCHLORIDE 1 MG/ML
INJECTION, SOLUTION INTRAMUSCULAR; INTRAVENOUS
Status: COMPLETED
Start: 2024-01-01 | End: 2024-01-01

## 2024-01-01 RX ORDER — ALBUMIN HUMAN 50 G/1000ML
25 SOLUTION INTRAVENOUS ONCE
Status: COMPLETED | OUTPATIENT
Start: 2024-01-01 | End: 2024-01-01

## 2024-01-01 RX ORDER — ONDANSETRON HYDROCHLORIDE 2 MG/ML
4 INJECTION, SOLUTION INTRAVENOUS EVERY 8 HOURS PRN
Status: DISCONTINUED | OUTPATIENT
Start: 2024-01-01 | End: 2024-01-01 | Stop reason: HOSPADM

## 2024-01-01 RX ORDER — MIDODRINE HYDROCHLORIDE 5 MG/1
5 TABLET ORAL
Status: DISCONTINUED | OUTPATIENT
Start: 2024-01-01 | End: 2024-01-01 | Stop reason: HOSPADM

## 2024-01-01 RX ORDER — LACTULOSE 10 G/15ML
20 SOLUTION ORAL EVERY 8 HOURS
Status: DISCONTINUED | OUTPATIENT
Start: 2024-01-01 | End: 2024-01-01 | Stop reason: HOSPADM

## 2024-01-01 RX ORDER — FUROSEMIDE 10 MG/ML
60 INJECTION INTRAMUSCULAR; INTRAVENOUS 2 TIMES DAILY
Status: DISCONTINUED | OUTPATIENT
Start: 2024-01-01 | End: 2024-01-01 | Stop reason: HOSPADM

## 2024-01-01 RX ORDER — URSODIOL 300 MG/1
500 CAPSULE ORAL 2 TIMES DAILY
Status: DISCONTINUED | OUTPATIENT
Start: 2024-01-01 | End: 2024-01-01

## 2024-01-01 RX ORDER — IBUPROFEN 200 MG
24 TABLET ORAL
Status: DISCONTINUED | OUTPATIENT
Start: 2024-01-01 | End: 2024-01-01 | Stop reason: HOSPADM

## 2024-01-01 RX ORDER — SODIUM CHLORIDE 0.9 % (FLUSH) 0.9 %
10 SYRINGE (ML) INJECTION
Status: DISCONTINUED | OUTPATIENT
Start: 2024-01-01 | End: 2024-01-01 | Stop reason: HOSPADM

## 2024-01-01 RX ORDER — LACTULOSE 10 G/15ML
20 SOLUTION ORAL 3 TIMES DAILY
Status: DISCONTINUED | OUTPATIENT
Start: 2024-01-01 | End: 2024-01-01

## 2024-01-01 RX ADMIN — PIPERACILLIN SODIUM AND TAZOBACTAM SODIUM 4.5 G: 4; .5 INJECTION, POWDER, FOR SOLUTION INTRAVENOUS at 05:11

## 2024-01-01 RX ADMIN — ETOMIDATE 30 MG: 2 INJECTION INTRAVENOUS at 01:11

## 2024-01-01 RX ADMIN — URSODIOL 600 MG: 300 CAPSULE ORAL at 08:11

## 2024-01-01 RX ADMIN — Medication 250 MCG/HR: at 02:11

## 2024-01-01 RX ADMIN — DEXMEDETOMIDINE HYDROCHLORIDE 0.7 MCG/KG/HR: 4 INJECTION INTRAVENOUS at 11:11

## 2024-01-01 RX ADMIN — MUPIROCIN: 20 OINTMENT TOPICAL at 08:11

## 2024-01-01 RX ADMIN — LACTULOSE 20 G: 20 SOLUTION ORAL at 09:11

## 2024-01-01 RX ADMIN — POTASSIUM BICARBONATE 30 MEQ: 391 TABLET, EFFERVESCENT ORAL at 08:11

## 2024-01-01 RX ADMIN — FUROSEMIDE 60 MG: 10 INJECTION, SOLUTION INTRAMUSCULAR; INTRAVENOUS at 09:11

## 2024-01-01 RX ADMIN — CHLORHEXIDINE GLUCONATE 0.12% ORAL RINSE 15 ML: 1.2 LIQUID ORAL at 09:11

## 2024-01-01 RX ADMIN — Medication 100 MCG/HR: at 05:11

## 2024-01-01 RX ADMIN — PIPERACILLIN SODIUM AND TAZOBACTAM SODIUM 4.5 G: 4; .5 INJECTION, POWDER, FOR SOLUTION INTRAVENOUS at 09:11

## 2024-01-01 RX ADMIN — PIPERACILLIN SODIUM AND TAZOBACTAM SODIUM 4.5 G: 4; .5 INJECTION, POWDER, FOR SOLUTION INTRAVENOUS at 04:11

## 2024-01-01 RX ADMIN — Medication 0.1 MCG/KG/MIN: at 03:11

## 2024-01-01 RX ADMIN — PANTOPRAZOLE SODIUM 40 MG: 40 INJECTION, POWDER, FOR SOLUTION INTRAVENOUS at 08:11

## 2024-01-01 RX ADMIN — RIFAXIMIN 550 MG: 550 TABLET ORAL at 08:11

## 2024-01-01 RX ADMIN — ALBUMIN (HUMAN) 25 G: 5 SOLUTION INTRAVENOUS at 03:11

## 2024-01-01 RX ADMIN — THERA TABS 1 TABLET: TAB at 11:11

## 2024-01-01 RX ADMIN — NOREPINEPHRINE BITARTRATE 0.3 MCG/KG/MIN: 1 INJECTION, SOLUTION, CONCENTRATE INTRAVENOUS at 04:11

## 2024-01-01 RX ADMIN — RIFAXIMIN 550 MG: 550 TABLET ORAL at 09:11

## 2024-01-01 RX ADMIN — URSODIOL 600 MG: 300 CAPSULE ORAL at 09:11

## 2024-01-01 RX ADMIN — NOREPINEPHRINE BITARTRATE 0.03 MCG/KG/MIN: 16 SOLUTION INTRAVENOUS at 05:11

## 2024-01-01 RX ADMIN — MIDAZOLAM HYDROCHLORIDE 4 MG: 1 INJECTION, SOLUTION INTRAMUSCULAR; INTRAVENOUS at 05:11

## 2024-01-01 RX ADMIN — PROPOFOL 30 MCG/KG/MIN: 10 INJECTION, EMULSION INTRAVENOUS at 09:11

## 2024-01-01 RX ADMIN — NOREPINEPHRINE BITARTRATE 0.14 MCG/KG/MIN: 1 INJECTION, SOLUTION, CONCENTRATE INTRAVENOUS at 03:11

## 2024-01-01 RX ADMIN — PROPOFOL 50 MCG/KG/MIN: 10 INJECTION, EMULSION INTRAVENOUS at 10:11

## 2024-01-01 RX ADMIN — PROPOFOL 50 MCG/KG/MIN: 10 INJECTION, EMULSION INTRAVENOUS at 08:11

## 2024-01-01 RX ADMIN — LACTULOSE 20 G: 20 SOLUTION ORAL at 06:11

## 2024-01-01 RX ADMIN — POTASSIUM CHLORIDE 40 MEQ: 1500 TABLET, EXTENDED RELEASE ORAL at 03:11

## 2024-01-01 RX ADMIN — LACTULOSE 20 G: 20 SOLUTION ORAL at 01:11

## 2024-01-01 RX ADMIN — PIPERACILLIN AND TAZOBACTAM 4.5 G: 4; .5 INJECTION, POWDER, LYOPHILIZED, FOR SOLUTION INTRAVENOUS; PARENTERAL at 05:11

## 2024-01-01 RX ADMIN — PIPERACILLIN SODIUM AND TAZOBACTAM SODIUM 4.5 G: 4; .5 INJECTION, POWDER, FOR SOLUTION INTRAVENOUS at 01:11

## 2024-01-01 RX ADMIN — LACTULOSE 20 G: 20 SOLUTION ORAL at 05:11

## 2024-01-01 RX ADMIN — FUROSEMIDE 60 MG: 10 INJECTION, SOLUTION INTRAMUSCULAR; INTRAVENOUS at 08:11

## 2024-01-01 RX ADMIN — MELATONIN TAB 3 MG 6 MG: 3 TAB at 08:11

## 2024-01-01 RX ADMIN — MUPIROCIN: 20 OINTMENT TOPICAL at 10:11

## 2024-01-01 RX ADMIN — DEXMEDETOMIDINE HYDROCHLORIDE 0.5 MCG/KG/HR: 4 INJECTION INTRAVENOUS at 06:11

## 2024-01-01 RX ADMIN — URSODIOL 600 MG: 300 CAPSULE ORAL at 03:11

## 2024-01-01 RX ADMIN — PROPOFOL 50 MCG/KG/MIN: 10 INJECTION, EMULSION INTRAVENOUS at 02:11

## 2024-01-01 RX ADMIN — DEXMEDETOMIDINE HYDROCHLORIDE 0.2 MCG/KG/HR: 4 INJECTION INTRAVENOUS at 11:11

## 2024-01-01 RX ADMIN — CEFTRIAXONE 1 G: 1 INJECTION, POWDER, FOR SOLUTION INTRAMUSCULAR; INTRAVENOUS at 09:11

## 2024-01-01 RX ADMIN — CHOLECALCIFEROL TAB 125 MCG (5000 UNIT) 5000 UNITS: 125 TAB at 06:11

## 2024-01-01 RX ADMIN — LACTULOSE 20 G: 20 SOLUTION ORAL at 11:11

## 2024-01-01 RX ADMIN — MUPIROCIN: 20 OINTMENT TOPICAL at 09:11

## 2024-01-01 RX ADMIN — HEPARIN SODIUM 7500 UNITS: 5000 INJECTION INTRAVENOUS; SUBCUTANEOUS at 09:11

## 2024-01-01 RX ADMIN — ALBUMIN (HUMAN) 25 G: 12.5 SOLUTION INTRAVENOUS at 09:11

## 2024-01-01 RX ADMIN — Medication 25 MCG/HR: at 01:11

## 2024-01-01 RX ADMIN — LORAZEPAM 2 MG: 2 INJECTION INTRAMUSCULAR; INTRAVENOUS at 09:11

## 2024-01-01 RX ADMIN — HEPARIN SODIUM 7500 UNITS: 5000 INJECTION INTRAVENOUS; SUBCUTANEOUS at 05:11

## 2024-01-01 RX ADMIN — NOREPINEPHRINE BITARTRATE 0.24 MCG/KG/MIN: 1 INJECTION, SOLUTION, CONCENTRATE INTRAVENOUS at 12:11

## 2024-01-01 RX ADMIN — PANTOPRAZOLE SODIUM 40 MG: 40 INJECTION, POWDER, FOR SOLUTION INTRAVENOUS at 09:11

## 2024-01-01 RX ADMIN — CEFTRIAXONE 1 G: 1 INJECTION, POWDER, FOR SOLUTION INTRAMUSCULAR; INTRAVENOUS at 06:11

## 2024-01-01 RX ADMIN — URSODIOL 600 MG: 300 CAPSULE ORAL at 11:11

## 2024-01-01 RX ADMIN — PROPOFOL 50 MCG/KG/MIN: 10 INJECTION, EMULSION INTRAVENOUS at 07:11

## 2024-01-01 RX ADMIN — MIDODRINE HYDROCHLORIDE 5 MG: 5 TABLET ORAL at 05:11

## 2024-01-01 RX ADMIN — Medication 0.18 MCG/KG/MIN: at 06:11

## 2024-01-01 RX ADMIN — CYANOCOBALAMIN 1000 MCG: 1000 INJECTION INTRAMUSCULAR; SUBCUTANEOUS at 09:11

## 2024-01-01 RX ADMIN — POTASSIUM BICARBONATE 40 MEQ: 391 TABLET, EFFERVESCENT ORAL at 05:11

## 2024-01-01 RX ADMIN — MAGNESIUM SULFATE IN WATER 2 G: 40 INJECTION, SOLUTION INTRAVENOUS at 05:11

## 2024-01-01 RX ADMIN — Medication 1 TABLET: at 09:11

## 2024-01-01 RX ADMIN — OCTREOTIDE ACETATE 100 MCG: 100 INJECTION, SOLUTION INTRAVENOUS; SUBCUTANEOUS at 10:11

## 2024-01-01 RX ADMIN — Medication 250 MCG/HR: at 11:11

## 2024-01-01 RX ADMIN — OCTREOTIDE ACETATE 100 MCG: 100 INJECTION, SOLUTION INTRAVENOUS; SUBCUTANEOUS at 03:11

## 2024-01-01 RX ADMIN — Medication 125 MCG/HR: at 06:11

## 2024-01-01 RX ADMIN — LORAZEPAM 2 MG: 2 INJECTION INTRAMUSCULAR; INTRAVENOUS at 11:11

## 2024-01-01 RX ADMIN — CHOLECALCIFEROL TAB 125 MCG (5000 UNIT) 5000 UNITS: 125 TAB at 11:11

## 2024-01-01 RX ADMIN — MIDODRINE HYDROCHLORIDE 5 MG: 5 TABLET ORAL at 11:11

## 2024-01-01 RX ADMIN — FUROSEMIDE 60 MG: 10 INJECTION, SOLUTION INTRAMUSCULAR; INTRAVENOUS at 10:11

## 2024-01-01 RX ADMIN — FUROSEMIDE 120 MG: 10 INJECTION, SOLUTION INTRAVENOUS at 11:11

## 2024-01-01 RX ADMIN — POTASSIUM CHLORIDE 40 MEQ: 29.8 INJECTION, SOLUTION INTRAVENOUS at 04:11

## 2024-01-01 RX ADMIN — DEXMEDETOMIDINE HYDROCHLORIDE 0.5 MCG/KG/HR: 4 INJECTION INTRAVENOUS at 03:11

## 2024-01-01 RX ADMIN — PIPERACILLIN SODIUM AND TAZOBACTAM SODIUM 4.5 G: 4; .5 INJECTION, POWDER, FOR SOLUTION INTRAVENOUS at 12:11

## 2024-01-01 RX ADMIN — CYANOCOBALAMIN 1000 MCG: 1000 INJECTION INTRAMUSCULAR; SUBCUTANEOUS at 06:11

## 2024-01-01 RX ADMIN — PHYTONADIONE 10 MG: 10 INJECTION, EMULSION INTRAMUSCULAR; INTRAVENOUS; SUBCUTANEOUS at 12:11

## 2024-01-01 RX ADMIN — POTASSIUM CHLORIDE 10 MEQ: 7.46 INJECTION, SOLUTION INTRAVENOUS at 05:11

## 2024-01-01 RX ADMIN — DEXMEDETOMIDINE HYDROCHLORIDE 0.6 MCG/KG/HR: 4 INJECTION INTRAVENOUS at 07:11

## 2024-01-01 RX ADMIN — POTASSIUM CHLORIDE 40 MEQ: 1500 TABLET, EXTENDED RELEASE ORAL at 08:11

## 2024-01-01 RX ADMIN — DEXMEDETOMIDINE HYDROCHLORIDE 0.6 MCG/KG/HR: 4 INJECTION INTRAVENOUS at 03:11

## 2024-01-01 RX ADMIN — LACTULOSE 20 G: 20 SOLUTION ORAL at 08:11

## 2024-01-01 RX ADMIN — NOREPINEPHRINE BITARTRATE 0.4 MCG/KG/MIN: 1 INJECTION, SOLUTION, CONCENTRATE INTRAVENOUS at 08:11

## 2024-01-01 RX ADMIN — LORAZEPAM 2 MG: 2 INJECTION INTRAMUSCULAR; INTRAVENOUS at 03:11

## 2024-01-01 RX ADMIN — PHYTONADIONE 10 MG: 10 INJECTION, EMULSION INTRAMUSCULAR; INTRAVENOUS; SUBCUTANEOUS at 09:11

## 2024-01-01 RX ADMIN — MELATONIN TAB 3 MG 6 MG: 3 TAB at 09:11

## 2024-01-01 RX ADMIN — Medication 1 TABLET: at 06:11

## 2024-01-01 RX ADMIN — PANTOPRAZOLE SODIUM 40 MG: 40 INJECTION, POWDER, LYOPHILIZED, FOR SOLUTION INTRAVENOUS at 08:11

## 2024-01-01 RX ADMIN — NOREPINEPHRINE BITARTRATE 0.06 MCG/KG/MIN: 16 SOLUTION INTRAVENOUS at 10:11

## 2024-01-01 RX ADMIN — MIDODRINE HYDROCHLORIDE 5 MG: 5 TABLET ORAL at 03:11

## 2024-01-01 RX ADMIN — CHLORHEXIDINE GLUCONATE 0.12% ORAL RINSE 15 ML: 1.2 LIQUID ORAL at 08:11

## 2024-01-01 RX ADMIN — PROPOFOL 50 MCG/KG/MIN: 10 INJECTION, EMULSION INTRAVENOUS at 04:11

## 2024-01-01 RX ADMIN — VASOPRESSIN 0.04 UNITS/MIN: 20 INJECTION INTRAVENOUS at 11:11

## 2024-01-01 RX ADMIN — NOREPINEPHRINE BITARTRATE 0.1 MCG/KG/MIN: 0.02 INJECTION, SOLUTION INTRAVENOUS at 01:11

## 2024-01-01 RX ADMIN — VANCOMYCIN HYDROCHLORIDE 2000 MG: 10 INJECTION, POWDER, LYOPHILIZED, FOR SOLUTION INTRAVENOUS at 11:11

## 2024-01-01 RX ADMIN — ACETAMINOPHEN 650 MG: 325 TABLET ORAL at 11:11

## 2024-01-01 RX ADMIN — VASOPRESSIN 0.04 UNITS/MIN: 20 INJECTION INTRAVENOUS at 08:11

## 2024-01-01 RX ADMIN — PREDNISOLONE SODIUM PHOSPHATE 40 MG: 15 SOLUTION ORAL at 03:11

## 2024-01-01 RX ADMIN — DEXMEDETOMIDINE HYDROCHLORIDE 0.5 MCG/KG/HR: 4 INJECTION INTRAVENOUS at 10:11

## 2024-01-01 RX ADMIN — POTASSIUM CHLORIDE 10 MEQ: 7.46 INJECTION, SOLUTION INTRAVENOUS at 08:11

## 2024-01-01 RX ADMIN — PROPOFOL 50 MCG/KG/MIN: 10 INJECTION, EMULSION INTRAVENOUS at 09:11

## 2024-01-01 RX ADMIN — PROPOFOL 50 MCG/KG/MIN: 10 INJECTION, EMULSION INTRAVENOUS at 12:11

## 2024-01-01 RX ADMIN — Medication 1 TABLET: at 11:11

## 2024-01-01 RX ADMIN — Medication 10 ML: at 03:11

## 2024-01-01 RX ADMIN — THERA TABS 1 TABLET: TAB at 09:11

## 2024-01-01 RX ADMIN — LORAZEPAM 2 MG: 2 INJECTION INTRAMUSCULAR at 11:11

## 2024-01-01 RX ADMIN — CEFTRIAXONE 1 G: 1 INJECTION, POWDER, FOR SOLUTION INTRAMUSCULAR; INTRAVENOUS at 08:11

## 2024-01-01 RX ADMIN — Medication 100 MG: at 06:11

## 2024-01-01 RX ADMIN — VASOPRESSIN 0.04 UNITS/MIN: 20 INJECTION INTRAVENOUS at 02:11

## 2024-01-01 RX ADMIN — POTASSIUM CHLORIDE 10 MEQ: 7.46 INJECTION, SOLUTION INTRAVENOUS at 06:11

## 2024-01-01 RX ADMIN — Medication 100 MG: at 11:11

## 2024-01-01 RX ADMIN — POTASSIUM CHLORIDE 10 MEQ: 7.46 INJECTION, SOLUTION INTRAVENOUS at 09:11

## 2024-01-01 RX ADMIN — IPRATROPIUM BROMIDE AND ALBUTEROL SULFATE 3 ML: 2.5; .5 SOLUTION RESPIRATORY (INHALATION) at 06:11

## 2024-01-01 RX ADMIN — CHOLECALCIFEROL TAB 125 MCG (5000 UNIT) 5000 UNITS: 125 TAB at 09:11

## 2024-01-01 RX ADMIN — PIPERACILLIN AND TAZOBACTAM 4.5 G: 4; .5 INJECTION, POWDER, LYOPHILIZED, FOR SOLUTION INTRAVENOUS; PARENTERAL at 11:11

## 2024-01-01 RX ADMIN — Medication 100 MG: at 09:11

## 2024-01-01 RX ADMIN — POTASSIUM BICARBONATE 30 MEQ: 391 TABLET, EFFERVESCENT ORAL at 05:11

## 2024-01-01 RX ADMIN — THERA TABS 1 TABLET: TAB at 06:11

## 2024-01-01 RX ADMIN — LACTULOSE 20 G: 20 SOLUTION ORAL at 03:11

## 2024-01-01 RX ADMIN — Medication 150 MCG/HR: at 12:11

## 2024-01-01 RX ADMIN — PROPOFOL 35 MCG/KG/MIN: 10 INJECTION, EMULSION INTRAVENOUS at 06:11

## 2024-01-01 RX ADMIN — URSODIOL 600 MG: 300 CAPSULE ORAL at 02:11

## 2024-01-01 RX ADMIN — MIDAZOLAM 4 MG: 1 INJECTION INTRAMUSCULAR; INTRAVENOUS at 05:11

## 2024-01-01 RX ADMIN — PROPOFOL 20 MCG/KG/MIN: 10 INJECTION, EMULSION INTRAVENOUS at 05:11

## 2024-01-01 RX ADMIN — SPIRONOLACTONE 100 MG: 25 TABLET ORAL at 11:11

## 2024-01-01 RX ADMIN — VANCOMYCIN HYDROCHLORIDE 1500 MG: 1.5 INJECTION, POWDER, LYOPHILIZED, FOR SOLUTION INTRAVENOUS at 10:11

## 2024-10-02 ENCOUNTER — TELEPHONE (OUTPATIENT)
Dept: HEPATOLOGY | Facility: CLINIC | Age: 39
End: 2024-10-02
Payer: COMMERCIAL

## 2024-10-02 NOTE — TELEPHONE ENCOUNTER
Left a voicemail for Dr. Juan's nurse requesting imaging that was not attached to patient referral.     I also made them aware of Dr. Figueroa departure and first available with MARGE

## 2024-10-10 ENCOUNTER — TELEPHONE (OUTPATIENT)
Dept: HEPATOLOGY | Facility: CLINIC | Age: 39
End: 2024-10-10
Payer: COMMERCIAL

## 2024-10-10 NOTE — TELEPHONE ENCOUNTER
Attempted to contact patient at [phone] with no answer. Left voicemail message for patient to return call.     Attempted to contact Dr. Juan office to request imaging and make them aware of first available.  states that nurse was unavailable and left a message for staff to return call.

## 2024-10-10 NOTE — TELEPHONE ENCOUNTER
----- Message from Med Assistant Anna sent at 10/10/2024 10:05 AM CDT -----    ----- Message -----  From: Joan Conway  Sent: 10/3/2024   4:32 PM CDT  To: [facility] Clinical Staff     Hello,    Can you please provide a status on scheduling this patient.    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  Referral/records are scanned in media mgr. Please contact patient to schedule.    Thanks

## 2024-10-14 ENCOUNTER — TELEPHONE (OUTPATIENT)
Dept: HEPATOLOGY | Facility: CLINIC | Age: 39
End: 2024-10-14
Payer: COMMERCIAL

## 2024-10-14 NOTE — TELEPHONE ENCOUNTER
Attempted to contact patient at [phone] with no answer. Left voicemail message for patient to return call.

## 2024-11-17 PROBLEM — R10.84 GENERALIZED ABDOMINAL PAIN: Status: ACTIVE | Noted: 2024-01-01

## 2024-11-17 PROBLEM — R06.02 SHORTNESS OF BREATH: Status: ACTIVE | Noted: 2024-01-01

## 2024-11-17 NOTE — PROVIDER TRANSFER
Outside Transfer Acceptance Note / [facility] Referral [facility]    Referring facility: [facility]   Referring provider: DAJA LEIGH  Accepting facility: [facility]  Accepting provider: TONY TAMEZ  Admitting provider: [facility] hospitalist  Reason for transfer: Higher Level of Care   Transfer diagnosis: alcoholic cirrhosis  Transfer specialty requested: Hepatology  Transfer specialty notified: Yes  Transfer level: NUMBER 1-5: 2  Bed type requested: medtel  Isolation status: No active isolations   Admission class or status: IP- Inpatient      Narrative     39 with depression, FAWAD, HTN, alcoholic misuse, primary biliary cirrhosis, alcoholic cirrhosis with intermittent follow up with GI came in for SOB. He was found to have bilateral pleural effusion and pulmonary edema. He has not taking urosdiol and furosemide for at least a couple months and is actively drinking. Bilirubin last checked was 7.8 an is now 20. INR 2. MELD 30. GI at sending facility recommended evaluation at facility with liver transplant capability. Dr. Macias, with heptology, aggreed to evaluate patient on medicine service.    vitals 98.1, 115, 36, 145/58, 79%    SCr 0.97, BUN 9, Na 123, K 3.3, Cl 84, CO2 25, glc 123, Ca 8.3, to prot 7.3., Aln 2.2, t bili 20.3, alk phos 248, , ALT 50, , , INR 2, WC 10, Hgb 105., Hct 29.3, MCV 90, plt 124, neut 84%     Objective     Airway:   room air   Allergies: Review of patient's allergies indicates:  Not on File   NPO: No    Anticoagulation:   Anticoagulants       None             Instructions      [facility]  Admit to Hospital Medicine  Upon patient arrival to floor, please contact Hospital Medicine on call.

## 2024-11-18 PROBLEM — E66.01 CLASS 3 SEVERE OBESITY DUE TO EXCESS CALORIES WITH BODY MASS INDEX (BMI) OF 50.0 TO 59.9 IN ADULT: Chronic | Status: ACTIVE | Noted: 2024-01-01

## 2024-11-18 PROBLEM — I10 HYPERTENSION: Chronic | Status: ACTIVE | Noted: 2024-01-01

## 2024-11-18 PROBLEM — K76.82 ACUTE HEPATIC ENCEPHALOPATHY: Status: ACTIVE | Noted: 2024-01-01

## 2024-11-18 PROBLEM — K21.9 GERD (GASTROESOPHAGEAL REFLUX DISEASE): Status: ACTIVE | Noted: 2024-01-01

## 2024-11-18 PROBLEM — R60.1 ANASARCA: Status: ACTIVE | Noted: 2024-01-01

## 2024-11-18 PROBLEM — I10 HYPERTENSION: Status: ACTIVE | Noted: 2024-01-01

## 2024-11-18 PROBLEM — K21.9 GERD (GASTROESOPHAGEAL REFLUX DISEASE): Chronic | Status: ACTIVE | Noted: 2024-01-01

## 2024-11-18 PROBLEM — K74.3 HEPATIC CIRRHOSIS DUE TO PRIMARY BILIARY CHOLANGITIS: Chronic | Status: ACTIVE | Noted: 2024-01-01

## 2024-11-18 PROBLEM — K74.3 HEPATIC CIRRHOSIS DUE TO PRIMARY BILIARY CHOLANGITIS: Status: ACTIVE | Noted: 2024-01-01

## 2024-11-18 PROBLEM — E66.813 CLASS 3 SEVERE OBESITY DUE TO EXCESS CALORIES WITH BODY MASS INDEX (BMI) OF 50.0 TO 59.9 IN ADULT: Status: ACTIVE | Noted: 2024-01-01

## 2024-11-18 PROBLEM — E66.813 CLASS 3 SEVERE OBESITY DUE TO EXCESS CALORIES WITH BODY MASS INDEX (BMI) OF 50.0 TO 59.9 IN ADULT: Chronic | Status: ACTIVE | Noted: 2024-01-01

## 2024-11-18 PROBLEM — E66.01 CLASS 3 SEVERE OBESITY DUE TO EXCESS CALORIES WITH BODY MASS INDEX (BMI) OF 50.0 TO 59.9 IN ADULT: Status: ACTIVE | Noted: 2024-01-01

## 2024-11-18 NOTE — ASSESSMENT & PLAN NOTE
Patient reports chronic generalized abdominal pain unchanged from baseline in his currently followed by GI/hepatology outpatient for liver cirrhosis and PBC as noted below. Patient currently admitted for CHF and liver failure. GI/Hepatology consulted and awaiting further evaluation/recommendations. Outpatient workup as noted below.    Abdominal US 11/1/24  -Cirrhosis. No definite liver mass is seen. Visualization of the liver is limited due to the severe diffuse hyperechogenicity of the liver, which is unlikely due to hepatic steatosis since no significant fat was seen on the recent prior MRI.   -No evidence of ascites. Splenomegaly and recanalization of the umbilical vein are compatible with portal hypertension.   -Limited study due to nonvisualization of the common bile duct.     EGD 4/11/24  -Esophagus: Grade 1 varices. Irregular z-line@38cm  -Stomach: 2x ulcers (punctate antral, 1cm white based flat prepyloric)     Pathology EGD 4/11/24     1. Stomach, biopsy:                                                        - Fragments of gastric oxyntic-type mucosa with focal erosion and reactive epithelial changes.                                        - Negative for intestinal metaplasia, dysplasia or malignancy.          - Immunostain for H. pylori is negative for Helicobacter pylori organisms.                                                             2. Esophagus, GE junction, biopsy:                                         - Fragments of gastric cardia-type mucosa with chronic inflammation, foveolar hyperplasia and reactive epithelial changes.                                                           - Negative for intestinal metaplasia, dysplasia or malignancy.       MRI Abdomen W/WO 3/12/24   -Cirrhosis and portal hypertension with recanalized umbilical vein, varices, and splenomegaly.     Liver Biopsy 3/8/24  - Steatohepatitis with moderate steatosis, mild activity and well-developed cirrhosis.                                                - Rare noncaseating granulomas. See comment.     Plan:  -NPO  -Continue current pain regimen, titrate as needed  -Bowel regimen  -Bedrest  -Antiemetics prn  -Tylenol as needed for fever   -Continue antibiotics   -f/u Hepatology    Sec to Ascites, check US abd-   May need paracentesis

## 2024-11-18 NOTE — NURSING
Pt sitting up in bed, AAOx4.  Pt reports SOB on exertion.  Initial assessment complete.  Refer to flowchart.  Orders acknowledged. Will continue to monitor.

## 2024-11-18 NOTE — HPI
Mr. Mendoza is a 39 year old male patient whose current medical conditions include ETOH abuse, GERD, HTN, obesity, and cirrhosis who presented to [facility] yesterday to increased SOB over the past two weeks. Associated symptoms included BLE edema, chronic abdominal bloating, orthopnea, and PND. He denied any associated reese CP, palpitations, near syncope, or syncope. Initial workup at outside facility revealed BNP of 804, bumped LFT's, Tbili 21, hyponatremia (Na 123), and hypoalbuminemia. CXR showed findings consistent with edema/CHF and transfer was requested to our facility for cardiology and hepatology evaluation. Patient seen and examined today, resting in bed. Feels ok. Remains SOB/overloaded. No CP symptoms. Previously had negative MPI stress test in 9/23. He admits to being out of urosdiol and Lasix for the past few months as he did not have any refills. TTE pending.

## 2024-11-18 NOTE — ASSESSMENT & PLAN NOTE
Chronic, controlled.  Latest blood pressure and vitals reviewed-   Temp:  [97.8 °F (36.6 °C)-98.1 °F (36.7 °C)]   Pulse:  [93-96]   Resp:  [18-20]   BP: (111-133)/(53-63)   SpO2:  [95 %-99 %] .   Home meds for hypertension were reviewed and noted below.     While in the hospital, will manage blood pressure as follows; Adjust home antihypertensive regimen as follows- resume home metoprolol once evaluated by Cardiology.  Currently on IV Lasix p.r.n. for chf workup.    Will utilize p.r.n. blood pressure medication only if patient's blood pressure greater than  180/110 and he develops symptoms such as worsening chest pain or shortness of breath.

## 2024-11-18 NOTE — NURSING
Pt refused bariatric bed. Requested regular hospital bed. Education given on increased risk for pressure injuries. Pt verbalized understanding.

## 2024-11-18 NOTE — PROGRESS NOTES
[facility] Medicine  Progress Note    Patient Name: Marcellus Mendoza  MRN: [id number]  Patient Class: IP- Inpatient   Admission Date: 11/17/2024  Length of Stay: 1 days  Attending Physician: Irma Napier MD  Primary Care Provider: Katlin, Primary Doctor        Subjective:     Principal Problem:Shortness of breath        HPI:  Marcellus Mendoza is a 39 y.o. male with a PMH  has a past medical history of Alcohol abuse, Anasarca, GERD (gastroesophageal reflux disease), Hypertension, Obesity, unspecified, Unspecified cirrhosis of liver, and Vitamin D deficiency. who presented as a transfer from outside facility for higher level of care after patient was found to have evidence of acute CHF exacerbation as well as further evaluation by hepatology regarding decompensated liver cirrhosis and PBC.  Patient reports unchanged chronic abdominal pain from baseline but reported his dyspnea/shortness of breath and lower extremity swelling had progressively worsened over the past 2 weeks prompting him to go to ED for further evaluation.  He reported no known alleviating factors with aggravating factors including laying flat and with minimal exertion.  Patient reported he has not taken his urosdiol or furosemide over the past few months secondary to not having any refills and thought they were a 1 time prescription.  At time of bedside assessment, patient reported improvement in symptoms following administration of IV Lasix at outside facility and is asking how long he will be admitted here for.  Patient informed that will depend on his ongoing workup, recommendations from specialists, and response to his treatment.  Patient understands treatment plan moving forward in his patient awaiting to see recommended specialists.  Prior to onset of symptoms, patient reported being in his usual state of health with no other concerns or complaints.  All other review of systems negative except as noted above.  Patient  admitted to Hospital Medicine inpatient for continued medical management.  Additional history as noted below per  physician at time of transfer.    39 with depression, FAWAD, HTN, alcoholic misuse, primary biliary cirrhosis, alcoholic cirrhosis with intermittent follow up with GI came in for SOB. He was found to have bilateral pleural effusion and pulmonary edema. He has not taking urosdiol and furosemide for at least a couple months and is actively drinking. Bilirubin last checked was 7.8 an is now 20. INR 2. MELD 30. GI at sending facility recommended evaluation at facility with liver transplant capability. Dr. Macias, with heptology, aggreed to evaluate patient on medicine service.    vitals 98.1, 115, 36, 145/58, 79%    SCr 0.97, BUN 9, Na 123, K 3.3, Cl 84, CO2 25, glc 123, Ca 8.3, to prot 7.3., Aln 2.2, t bili 20.3, alk phos 248, , ALT 50, , , INR 2, WC 10, Hgb 105., Hct 29.3, MCV 90, plt 124, neut 84%      PCP: No, Primary Doctor       Overview/Hospital Course:  39 year old male patient w ETOH abuse, GERD, HTN, obesity, and Alcoholic Cirrhosis/hx of PBC, who presented to [facility] yesterday to increased SOB over the past two weeks. Associated symptoms included BLE edema, chronic abdominal bloating, orthopnea, and PND. He denied any associated reese CP, palpitations, near syncope, or syncope. Initial workup at outside facility revealed BNP of 804, bumped LFT's, Tbili 21, hyponatremia (Na 123), and hypoalbuminemia. CXR showed findings consistent with edema/CHF and transfer was requested to our facility for cardiology and hepatology evaluation. Patient resting in bed. Feels ok. Remains SOB/overloaded. He admits to being out of urosdiol and Lasix for the past few months as he did not have any refills. Last echo in 9/23 showed normal EF.     11/18- Appreciate Cardiology and Hepatology input- Looks deeply jaundiced, but otherwise comfortable on RA. AAOx4, no asterixis. Gets dyspneic  while talking. Started on IV Lasix and Rocephin for SBP prevention. Abdomen distended, will get US Abd to r/o ascites and may need paracentesis. Ammonia high- 83, Na and Cl very low- 123/83, Alb 2, T bili 21, direct 14- will add lactulose and may add Rifaximin. Cont IV lasix, strict fluid restriction. May need Aldactone and Inderal. Will d/w Hepatology.     Interval History: Appreciate Cardiology and Hepatology input- Looks deeply jaundiced, but otherwise comfortable on RA. AAOx4, no asterixis. Gets dyspneic while talking. Started on IV Lasix and Rocephin for SBP prevention. Abdomen distended, will get US Abd to r/o ascites and may need paracentesis. Ammonia high- 83, Na and Cl very low- 123/83, Alb 2, T bili 21, direct 14- will add lactulose and may add Rifaximin. Cont IV lasix, strict fluid restriction. May need Aldactone and Inderal. Will d/w Hepatology.      Review of Systems   Constitutional:  Positive for activity change, appetite change and fatigue. Negative for chills, diaphoresis and fever.   HENT: Negative.  Negative for congestion, ear pain, facial swelling, hearing loss, nosebleeds, postnasal drip, rhinorrhea, sinus pressure, sneezing, sore throat, trouble swallowing and voice change.    Eyes: Negative.  Negative for pain and redness.   Respiratory:  Positive for shortness of breath. Negative for cough, chest tightness and wheezing.    Cardiovascular:  Positive for leg swelling. Negative for chest pain and palpitations.   Gastrointestinal:  Negative for abdominal pain, blood in stool, constipation, diarrhea, nausea and vomiting.   Endocrine: Negative for cold intolerance, heat intolerance, polydipsia, polyphagia and polyuria.   Genitourinary:  Negative for difficulty urinating, dysuria, flank pain, frequency, hematuria, scrotal swelling, testicular pain and urgency.   Musculoskeletal:  Negative for arthralgias, back pain, gait problem, joint swelling, myalgias, neck pain and neck stiffness.   Skin:   Negative for pallor, rash and wound.   Allergic/Immunologic: Negative for environmental allergies and food allergies.   Neurological:  Positive for weakness. Negative for dizziness, tremors, seizures, syncope, facial asymmetry, speech difficulty, light-headedness, numbness and headaches.   Hematological: Negative.  Does not bruise/bleed easily.   Psychiatric/Behavioral: Negative.     All other systems reviewed and are negative.    Objective:     Vital Signs (Most Recent):  Temp: 97.8 °F (36.6 °C) (11/18/24 1611)  Pulse: 105 (11/18/24 1611)  Resp: 18 (11/18/24 1611)  BP: 136/60 (11/18/24 1611)  SpO2: (!) 93 % (11/18/24 1611) Vital Signs (24h Range):  Temp:  [97.5 °F (36.4 °C)-98.1 °F (36.7 °C)] 97.8 °F (36.6 °C)  Pulse:  [] 105  Resp:  [18-20] 18  SpO2:  [93 %-99 %] 93 %  BP: (111-136)/(53-60) 136/60     Weight: (!) 165.1 kg (364 lb 0.4 oz)  Body mass index is 52.23 kg/m².    Intake/Output Summary (Last 24 hours) at 11/18/2024 1731  Last data filed at 11/18/2024 1107  Gross per 24 hour   Intake --   Output 600 ml   Net -600 ml         Physical Exam  Vitals reviewed.   Constitutional:       General: He is not in acute distress.     Appearance: Normal appearance. He is obese. He is ill-appearing. He is not toxic-appearing or diaphoretic.   HENT:      Head: Normocephalic and atraumatic.      Right Ear: External ear normal.      Left Ear: External ear normal.      Nose: Nose normal. No congestion or rhinorrhea.      Mouth/Throat:      Mouth: Mucous membranes are moist.      Pharynx: Oropharynx is clear. No oropharyngeal exudate or posterior oropharyngeal erythema.   Eyes:      General: Scleral icterus present.      Extraocular Movements: Extraocular movements intact.      Conjunctiva/sclera: Conjunctivae normal.      Pupils: Pupils are equal, round, and reactive to light.   Neck:      Vascular: No carotid bruit.   Cardiovascular:      Rate and Rhythm: Normal rate and regular rhythm.      Pulses: Normal pulses.       Heart sounds: Normal heart sounds. No murmur heard.     No friction rub. No gallop.   Pulmonary:      Effort: Pulmonary effort is normal. No respiratory distress.      Breath sounds: No stridor. No wheezing, rhonchi or rales.      Comments: Diminished breath sounds noted bilaterally throughout with scant crackles noted in bases.  Chest:      Chest wall: No tenderness.   Abdominal:      General: Abdomen is flat. Bowel sounds are normal. There is distension.      Palpations: Abdomen is soft. There is no mass.      Tenderness: There is no abdominal tenderness. There is no right CVA tenderness, left CVA tenderness, guarding or rebound.      Hernia: No hernia is present.   Genitourinary:     Comments: deferred  Musculoskeletal:         General: No swelling, tenderness, deformity or signs of injury. Normal range of motion.      Cervical back: Normal range of motion and neck supple. No rigidity or tenderness.      Right lower leg: No edema.      Left lower leg: No edema.   Lymphadenopathy:      Cervical: No cervical adenopathy.   Skin:     General: Skin is warm and dry.      Capillary Refill: Capillary refill takes less than 2 seconds.      Coloration: Skin is jaundiced. Skin is not pale.      Findings: No bruising, erythema, lesion or rash.   Neurological:      General: No focal deficit present.      Mental Status: He is alert and oriented to person, place, and time. Mental status is at baseline.      Cranial Nerves: No cranial nerve deficit.      Sensory: No sensory deficit.      Motor: No weakness.      Coordination: Coordination normal.   Psychiatric:         Mood and Affect: Mood normal.         Behavior: Behavior normal.         Thought Content: Thought content normal.         Judgment: Judgment normal.       Results for orders placed or performed during the hospital encounter of 11/17/24   Comprehensive Metabolic Panel (CMP)    Collection Time: 11/17/24  9:43 PM   Result Value Ref Range    Sodium 122 (L) 136 - 145  mmol/L    Potassium 3.1 (L) 3.5 - 5.1 mmol/L    Chloride 83 (L) 95 - 110 mmol/L    CO2 27 23 - 29 mmol/L    Glucose 120 (H) 70 - 110 mg/dL    BUN 12 6 - 20 mg/dL    Creatinine 1.2 0.5 - 1.4 mg/dL    Calcium 8.3 (L) 8.7 - 10.5 mg/dL    Total Protein 7.0 6.0 - 8.4 g/dL    Albumin 2.1 (L) 3.5 - 5.2 g/dL    Total Bilirubin 21.5 (H) 0.1 - 1.0 mg/dL    Alkaline Phosphatase 227 (H) 40 - 150 U/L     (H) 10 - 40 U/L    ALT 55 (H) 10 - 44 U/L    eGFR >60 >60 mL/min/1.73 m^2    Anion Gap 12 8 - 16 mmol/L   CBC with Automated Differential    Collection Time: 11/17/24  9:43 PM   Result Value Ref Range    WBC 8.64 3.90 - 12.70 K/uL    RBC 3.20 (L) 4.60 - 6.20 M/uL    Hemoglobin 10.1 (L) 14.0 - 18.0 g/dL    Hematocrit 28.2 (L) 40.0 - 54.0 %    MCV 88 82 - 98 fL    MCH 31.6 (H) 27.0 - 31.0 pg    MCHC 35.8 32.0 - 36.0 g/dL    RDW 24.1 (H) 11.5 - 14.5 %    Platelets 131 (L) 150 - 450 K/uL    MPV 10.6 9.2 - 12.9 fL    Immature Granulocytes 1.5 (H) 0.0 - 0.5 %    Gran # (ANC) 6.8 1.8 - 7.7 K/uL    Immature Grans (Abs) 0.13 (H) 0.00 - 0.04 K/uL    Lymph # 0.4 (L) 1.0 - 4.8 K/uL    Mono # 1.2 (H) 0.3 - 1.0 K/uL    Eos # 0.1 0.0 - 0.5 K/uL    Baso # 0.02 0.00 - 0.20 K/uL    nRBC 0 0 /100 WBC    Gran % 78.8 (H) 38.0 - 73.0 %    Lymph % 4.4 (L) 18.0 - 48.0 %    Mono % 14.1 4.0 - 15.0 %    Eosinophil % 1.0 0.0 - 8.0 %    Basophil % 0.2 0.0 - 1.9 %    Differential Method Automated    Brain natriuretic peptide    Collection Time: 11/17/24  9:43 PM   Result Value Ref Range     (H) 0 - 99 pg/mL   Ammonia    Collection Time: 11/17/24  9:43 PM   Result Value Ref Range    Ammonia 83 (H) 10 - 50 umol/L   Bilirubin, direct    Collection Time: 11/17/24  9:43 PM   Result Value Ref Range    Bilirubin, Direct >14.0 (H) 0.1 - 0.3 mg/dL   Lactic Acid, Plasma    Collection Time: 11/17/24  9:43 PM   Result Value Ref Range    Lactate (Lactic Acid) 2.0 0.5 - 2.2 mmol/L   Procalcitonin    Collection Time: 11/17/24  9:43 PM   Result Value Ref Range     Procalcitonin 0.62 (H) <0.25 ng/mL   Protime-INR    Collection Time: 11/17/24  9:43 PM   Result Value Ref Range    Prothrombin Time 19.7 (H) 9.0 - 12.5 sec    INR 1.9 (H) 0.8 - 1.2   APTT    Collection Time: 11/17/24  9:43 PM   Result Value Ref Range    aPTT 39.3 (H) 21.0 - 32.0 sec   Comprehensive Metabolic Panel (CMP)    Collection Time: 11/18/24  6:16 AM   Result Value Ref Range    Sodium 122 (L) 136 - 145 mmol/L    Potassium 3.2 (L) 3.5 - 5.1 mmol/L    Chloride 83 (L) 95 - 110 mmol/L    CO2 25 23 - 29 mmol/L    Glucose 108 70 - 110 mg/dL    BUN 13 6 - 20 mg/dL    Creatinine 1.2 0.5 - 1.4 mg/dL    Calcium 8.5 (L) 8.7 - 10.5 mg/dL    Total Protein 6.8 6.0 - 8.4 g/dL    Albumin 2.0 (L) 3.5 - 5.2 g/dL    Total Bilirubin 21.3 (H) 0.1 - 1.0 mg/dL    Alkaline Phosphatase 212 (H) 40 - 150 U/L     (H) 10 - 40 U/L    ALT 59 (H) 10 - 44 U/L    eGFR >60 >60 mL/min/1.73 m^2    Anion Gap 14 8 - 16 mmol/L   CBC with Automated Differential    Collection Time: 11/18/24  6:16 AM   Result Value Ref Range    WBC 8.56 3.90 - 12.70 K/uL    RBC 3.05 (L) 4.60 - 6.20 M/uL    Hemoglobin 9.6 (L) 14.0 - 18.0 g/dL    Hematocrit 26.5 (L) 40.0 - 54.0 %    MCV 87 82 - 98 fL    MCH 31.5 (H) 27.0 - 31.0 pg    MCHC 36.2 (H) 32.0 - 36.0 g/dL    RDW 24.0 (H) 11.5 - 14.5 %    Platelets 126 (L) 150 - 450 K/uL    MPV 10.3 9.2 - 12.9 fL    Immature Granulocytes 1.5 (H) 0.0 - 0.5 %    Gran # (ANC) 6.6 1.8 - 7.7 K/uL    Immature Grans (Abs) 0.13 (H) 0.00 - 0.04 K/uL    Lymph # 0.5 (L) 1.0 - 4.8 K/uL    Mono # 1.2 (H) 0.3 - 1.0 K/uL    Eos # 0.1 0.0 - 0.5 K/uL    Baso # 0.03 0.00 - 0.20 K/uL    nRBC 0 0 /100 WBC    Gran % 76.7 (H) 38.0 - 73.0 %    Lymph % 5.6 (L) 18.0 - 48.0 %    Mono % 14.3 4.0 - 15.0 %    Eosinophil % 1.5 0.0 - 8.0 %    Basophil % 0.4 0.0 - 1.9 %    Differential Method Automated    Magnesium    Collection Time: 11/18/24  6:16 AM   Result Value Ref Range    Magnesium 1.6 1.6 - 2.6 mg/dL   Echo    Collection Time: 11/18/24 11:37  AM   Result Value Ref Range    BSA 2.92 m2    LVOT stroke volume 109.3 cm3    LVIDd 5.4 3.5 - 6.0 cm    LV Systolic Volume 43.50 mL    LV Systolic Volume Index 16.2 mL/m2    LVIDs 3.3 2.1 - 4.0 cm    LV Diastolic Volume 142.62 mL    LV Diastolic Volume Index 53.02 mL/m2    Left Ventricular End Systolic Volume by Teichholz Method 43.50 mL    Left Ventricular End Diastolic Volume by Teichholz Method 142.62 mL    IVS 1.3 (A) 0.6 - 1.1 cm    LVOT diameter 2.0 cm    LVOT area 3.1 cm2    FS 38.9 28 - 44 %    Left Ventricle Relative Wall Thickness 0.52 cm    PW 1.4 (A) 0.6 - 1.1 cm    LV mass 311.7 g    LV Mass Index 115.9 g/m2    MV Peak E Bruce 1.19 m/s    TDI LATERAL 0.16 m/s    TDI SEPTAL 0.14 m/s    E/E' ratio 7.93 m/s    MV Peak A Bruce 0.70 m/s    TR Max Bruce 2.66 m/s    E/A ratio 1.70     IVRT 53.28 msec    E wave deceleration time 219.92 msec    LV SEPTAL E/E' RATIO 8.50 m/s    LV LATERAL E/E' RATIO 7.44 m/s    LVOT peak bruce 1.9 m/s    Left Ventricular Outflow Tract Mean Velocity 1.63 cm/s    Left Ventricular Outflow Tract Mean Gradient 10.90 mmHg    RVOT peak VTI 23.4 cm    TAPSE 2.70 cm    LA size 4.26 cm    Left Atrium Minor Axis 5.36 cm    Left Atrium Major Axis 5.64 cm    RA Major Axis 4.52 cm    AV mean gradient 11.4 mmHg    AV peak gradient 19.4 mmHg    Ao peak bruce 2.2 m/s    Ao VTI 39.6 cm    LVOT peak VTI 34.8 cm    AV valve area 2.8 cm²    AV Velocity Ratio 0.86     AV index (prosthetic) 0.88      by Velocity Ratio 2.7 cm²    Mr max bruce 4.21 m/s    MV stenosis pressure 1/2 time 63.78 ms    MV valve area p 1/2 method 3.45 cm2    Triscuspid Valve Regurgitation Peak Gradient 28 mmHg    PV mean gradient 6 mmHg    RVOT peak bruce 1.35 m/s    Ao root annulus 3.57 cm    STJ 3.14 cm    Ascending aorta 3.12 cm    IVC diameter 1.52 cm    Mean e' 0.15 m/s    ZLVIDS -11.62     ZLVIDD -15.21     DARLINE 27.4 mL/m2    LA Vol 73.64 cm3    LA WIDTH 3.7 cm    RA Width 3.2 cm    EF 65 %    TV resting pulmonary artery pressure 31  mmHg    RV TB RVSP 6 mmHg    Est. RA pres 3 mmHg        Significant Labs: All pertinent labs within the past 24 hours have been reviewed.     Significant Imaging: I have reviewed all pertinent imaging results/findings within the past 24 hours.    Assessment/Plan:      * Shortness of breath  Patient presented with worsening dyspnea/shortness a breath along with bilateral lower extremity edema in setting of noncompliance with home medications which include metoprolol and furosemide.  Patient also currently consumption alcohol.  Workup at outside facility revealed patient to have evidence of bilateral pleural effusions with pulmonary edema noted on chest x-ray.  Currently saturating 99% on room air in no acute distress.  H/H 10.1/28.2.  BNP elevated at 771.  Echo noted on 09/11/2023 revealed EF 55-65%.  Symptoms likely multifactorial in nature given underlying liver cirrhosis/PBC and possible new onset heart failure. Patient received Lasix 80 mg IV x1 prior to transfer with improvement in symptoms. Cardiology consulted and awaiting further evaluation/recommendations.  Plan:  -NPO  -telemetry  -monitor Is/Os  -daily weights   -f/u echo  -IV diuretics p.r.n.   -titrate oxygen therapy as needed  -incentive spirometry  -duo nebs p.r.n.  -f/u cardiology     Start IV lasix, check echo  May need Aldactone and Inderal      Acute hepatic encephalopathy  Ammonia high at 83  Start lactulose      Anasarca sec to Cirrhosis  Cont lasix      Class 3 severe obesity due to excess calories with body mass index (BMI) of 50.0 to 59.9 in adult  Body mass index is 54.53 kg/m². Morbid obesity complicates all aspects of disease management from diagnostic modalities to treatment. Weight loss encouraged and health benefits explained to patient.       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 Co-morbidities are present and inclusive of portal hypertension and  esophageal varices.  Hepatitis panel negative on 02/13/2024.  ELIEL, AFP negative on 02/16/2024.  Mitochondrial M2 antibody elevated at 29.0 on 02/16/2024.  MELD-Na score calculated; MELD 3.0: 31 at 11/18/2024  6:16 AM  MELD-Na: 32 at 11/18/2024  6:16 AM  Calculated from:  Serum Creatinine: 1.2 mg/dL at 11/18/2024  6:16 AM  Serum Sodium: 122 mmol/L (Using min of 125 mmol/L) at 11/18/2024  6:16 AM  Total Bilirubin: 21.3 mg/dL at 11/18/2024  6:16 AM  Serum Albumin: 2 g/dL at 11/18/2024  6:16 AM  INR(ratio): 1.9 at 11/17/2024  9:43 PM  Age at listing (hypothetical): 39 years  Sex: Male at 11/18/2024  6:16 AM    Continue chronic meds. 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 Will avoid any hepatotoxic meds, and monitor CBC/CMP/INR for synthetic function. Hepatology consulted and awaiting further evaluation/recommendations.    Cont diuresis, add lactulose  Triple vitamins    GERD (gastroesophageal reflux disease)  Chronic. Stable. Currently asymptomatic. Home medications include PPI/Antacids as needed.  Plan:  -Continue PPI/Antacids       Hypertension  Chronic, controlled.  Latest blood pressure and vitals reviewed-   Temp:  [97.5 °F (36.4 °C)-98.1 °F (36.7 °C)]   Pulse:  []   Resp:  [18-20]   BP: (111-136)/(53-60)   SpO2:  [93 %-99 %] .   Home meds for hypertension were reviewed and noted below.     While in the hospital, will manage blood pressure as follows; Adjust home antihypertensive regimen as follows- resume home metoprolol once evaluated by Cardiology.  Currently on IV Lasix p.r.n. for chf workup.    Will utilize p.r.n. blood pressure medication only if patient's blood pressure greater than  180/110 and he develops symptoms such as worsening chest pain or shortness of breath.      Generalized abdominal pain  Patient reports chronic generalized abdominal pain unchanged from baseline in his currently followed by GI/hepatology outpatient for liver cirrhosis and PBC as noted below. Patient currently admitted for CHF and  liver failure. GI/Hepatology consulted and awaiting further evaluation/recommendations. Outpatient workup as noted below.    Abdominal US 11/1/24  -Cirrhosis. No definite liver mass is seen. Visualization of the liver is limited due to the severe diffuse hyperechogenicity of the liver, which is unlikely due to hepatic steatosis since no significant fat was seen on the recent prior MRI.   -No evidence of ascites. Splenomegaly and recanalization of the umbilical vein are compatible with portal hypertension.   -Limited study due to nonvisualization of the common bile duct.     EGD 4/11/24  -Esophagus: Grade 1 varices. Irregular z-line@38cm  -Stomach: 2x ulcers (punctate antral, 1cm white based flat prepyloric)     Pathology EGD 4/11/24     1. Stomach, biopsy:                                                        - Fragments of gastric oxyntic-type mucosa with focal erosion and reactive epithelial changes.                                        - Negative for intestinal metaplasia, dysplasia or malignancy.          - Immunostain for H. pylori is negative for Helicobacter pylori organisms.                                                             2. Esophagus, GE junction, biopsy:                                         - Fragments of gastric cardia-type mucosa with chronic inflammation, foveolar hyperplasia and reactive epithelial changes.                                                           - Negative for intestinal metaplasia, dysplasia or malignancy.       MRI Abdomen W/WO 3/12/24   -Cirrhosis and portal hypertension with recanalized umbilical vein, varices, and splenomegaly.     Liver Biopsy 3/8/24  - Steatohepatitis with moderate steatosis, mild activity and well-developed cirrhosis.                                               - Rare noncaseating granulomas. See comment.     Plan:  -NPO  -Continue current pain regimen, titrate as needed  -Bowel regimen  -Bedrest  -Antiemetics prn  -Tylenol as needed for  fever   -Continue antibiotics   -f/u Hepatology    Sec to Ascites, check US abd-   May need paracentesis      VTE Risk Mitigation (From admission, onward)           Ordered     Reason for No Pharmacological VTE Prophylaxis  Once        Question:  Reasons:  Answer:  Physician Provided (leave comment)  Comment:  pending potential procedure    11/17/24 2056     IP VTE LOW RISK PATIENT  Once         11/17/24 2056     Place sequential compression device  Until discontinued         11/17/24 2056                    Discharge Planning   KENNEY:      Code Status: Full Code   Is the patient medically ready for discharge?:     Reason for patient still in hospital (select all that apply): Patient trending condition, Laboratory test, Treatment, Imaging, and Consult recommendations  Discharge Plan A: Home with family          Irma Napier MD  Department of Hospital Medicine   [facility] - Med Surg

## 2024-11-18 NOTE — ASSESSMENT & PLAN NOTE
Patient with known Cirrhosis with Child's class C. Co-morbidities are present and inclusive of portal hypertension and esophageal varices.  Hepatitis panel negative on 02/13/2024.  ELIEL, AFP negative on 02/16/2024.  Mitochondrial M2 antibody elevated at 29.0 on 02/16/2024.  MELD-Na score calculated; MELD 3.0: 31 at 11/17/2024  9:43 PM  MELD-Na: 32 at 11/17/2024  9:43 PM  Calculated from:  Serum Creatinine: 1.2 mg/dL at 11/17/2024  9:43 PM  Serum Sodium: 122 mmol/L (Using min of 125 mmol/L) at 11/17/2024  9:43 PM  Total Bilirubin: 21.5 mg/dL at 11/17/2024  9:43 PM  Serum Albumin: 2.1 g/dL at 11/17/2024  9:43 PM  INR(ratio): 1.9 at 11/17/2024  9:43 PM  Age at listing (hypothetical): 39 years  Sex: Male at 11/17/2024  9:43 PM    Continue chronic meds. 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 Will avoid any hepatotoxic meds, and monitor CBC/CMP/INR for synthetic function. Hepatology consulted and awaiting further evaluation/recommendations.

## 2024-11-18 NOTE — ASSESSMENT & PLAN NOTE
Patient presented with worsening dyspnea/shortness a breath along with bilateral lower extremity edema in setting of noncompliance with home medications which include metoprolol and furosemide.  Patient also currently consumption alcohol.  Workup at outside facility revealed patient to have evidence of bilateral pleural effusions with pulmonary edema noted on chest x-ray.  Currently saturating 99% on room air in no acute distress.  H/H 10.1/28.2.  BNP elevated at 771.  Echo noted on 09/11/2023 revealed EF 55-65%.  Symptoms likely multifactorial in nature given underlying liver cirrhosis/PBC and possible new onset heart failure. Patient received Lasix 80 mg IV x1 prior to transfer with improvement in symptoms. Cardiology consulted and awaiting further evaluation/recommendations.  Plan:  -NPO  -telemetry  -monitor Is/Os  -daily weights   -f/u echo  -IV diuretics p.r.n.   -titrate oxygen therapy as needed  -incentive spirometry  -duo nebs p.r.n.  -f/u cardiology     Start IV lasix, check echo  May need Aldactone and Inderal

## 2024-11-18 NOTE — ASSESSMENT & PLAN NOTE
Patient reports chronic generalized abdominal pain unchanged from baseline in his currently followed by GI/hepatology outpatient for liver cirrhosis and PBC as noted below. Patient currently admitted for CHF and liver failure. GI/Hepatology consulted and awaiting further evaluation/recommendations. Outpatient workup as noted below.    Abdominal US 11/1/24  -Cirrhosis. No definite liver mass is seen. Visualization of the liver is limited due to the severe diffuse hyperechogenicity of the liver, which is unlikely due to hepatic steatosis since no significant fat was seen on the recent prior MRI.   -No evidence of ascites. Splenomegaly and recanalization of the umbilical vein are compatible with portal hypertension.   -Limited study due to nonvisualization of the common bile duct.     EGD 4/11/24  -Esophagus: Grade 1 varices. Irregular z-line@38cm  -Stomach: 2x ulcers (punctate antral, 1cm white based flat prepyloric)     Pathology EGD 4/11/24     1. Stomach, biopsy:                                                        - Fragments of gastric oxyntic-type mucosa with focal erosion and reactive epithelial changes.                                        - Negative for intestinal metaplasia, dysplasia or malignancy.          - Immunostain for H. pylori is negative for Helicobacter pylori organisms.                                                             2. Esophagus, GE junction, biopsy:                                         - Fragments of gastric cardia-type mucosa with chronic inflammation, foveolar hyperplasia and reactive epithelial changes.                                                           - Negative for intestinal metaplasia, dysplasia or malignancy.       MRI Abdomen W/WO 3/12/24   -Cirrhosis and portal hypertension with recanalized umbilical vein, varices, and splenomegaly.     Liver Biopsy 3/8/24  - Steatohepatitis with moderate steatosis, mild activity and well-developed cirrhosis.                                                - Rare noncaseating granulomas. See comment.     Plan:  -NPO  -Continue current pain regimen, titrate as needed  -Bowel regimen  -Bedrest  -Antiemetics prn  -Tylenol as needed for fever   -Continue antibiotics   -f/u Hepatology

## 2024-11-18 NOTE — CONSULTS
[facility] S[facility]  Cardiology  Consult Note    Patient Name: Marcellus Mendoza  MRN: [id number]  Admission Date: 11/17/2024  Hospital Length of Stay: 1 days  Code Status: Full Code   Attending Provider: Irma Napier MD   Consulting Provider: Xiao Salazar PA-C  Primary Care Physician: Katlin, Primary Doctor  Principal Problem:Shortness of breath    Patient information was obtained from patient, past medical records, and ER records.     Inpatient consult to Cardiology  Consult performed by: Xiao Salazar PA-C  Consult ordered by: Montrell Allan MD        Subjective:     Chief Complaint:  SOB    HPI:   Mr. Mendoza is a 39 year old male patient whose current medical conditions include ETOH abuse, GERD, HTN, obesity, and cirrhosis who presented to [facility] yesterday to increased SOB over the past two weeks. Associated symptoms included BLE edema, chronic abdominal bloating, orthopnea, and PND. He denied any associated reese CP, palpitations, near syncope, or syncope. Initial workup at outside facility revealed BNP of 804, bumped LFT's, Tbili 21, hyponatremia (Na 123), and hypoalbuminemia. CXR showed findings consistent with edema/CHF and transfer was requested to our facility for cardiology and hepatology evaluation. Patient seen and examined today, resting in bed. Feels ok. Remains SOB/overloaded. No CP symptoms. Previously had negative MPI stress test in 9/23. He admits to being out of urosdiol and Lasix for the past few months as he did not have any refills. TTE pending, prior in 9/23 showed normal EF.             Past Medical History:   Diagnosis Date    Alcohol abuse     Anasarca     GERD (gastroesophageal reflux disease)     Hypertension     Obesity, unspecified     Unspecified cirrhosis of liver     Vitamin D deficiency        Past Surgical History:   Procedure Laterality Date    ORIF, FRACTURE, ANKLE, BIMALLEOLAR Right        Review of patient's allergies indicates:  No Known Allergies    No  current facility-administered medications on file prior to encounter.     No current outpatient medications on file prior to encounter.     Family History       Problem Relation (Age of Onset)    Breast cancer Mother    Diabetes Father          Tobacco Use    Smoking status: Former     Current packs/day: 0.00     Types: Cigarettes     Quit date: 2023     Years since quittin.3    Smokeless tobacco: Never   Substance and Sexual Activity    Alcohol use: Not Currently     Alcohol/week: 5.0 standard drinks of alcohol     Types: 5 Drinks containing 0.5 oz of alcohol per week     Comment: reports last drink     Drug use: Not Currently    Sexual activity: Yes     Review of Systems   Constitutional: Positive for malaise/fatigue and weight gain.   HENT: Negative.     Eyes: Negative.    Cardiovascular:  Positive for dyspnea on exertion, leg swelling, orthopnea and paroxysmal nocturnal dyspnea.   Respiratory:  Positive for shortness of breath.    Endocrine: Negative.    Hematologic/Lymphatic: Negative.    Skin:  Positive for color change.   Musculoskeletal: Negative.    Gastrointestinal:  Positive for bloating.   Genitourinary: Negative.    Neurological: Negative.    Psychiatric/Behavioral: Negative.     Allergic/Immunologic: Negative.      Objective:     Vital Signs (Most Recent):  Temp: 98.1 °F (36.7 °C) (24)  Pulse: (!) 111 (24 08)  Resp: 18 (24)  BP: (!) 124/58 (24)  SpO2: 97 % (24) Vital Signs (24h Range):  Temp:  [97.8 °F (36.6 °C)-98.1 °F (36.7 °C)] 98.1 °F (36.7 °C)  Pulse:  [] 111  Resp:  [18-20] 18  SpO2:  [95 %-99 %] 97 %  BP: (111-133)/(53-63) 124/58     Weight: (!) 165.1 kg (364 lb 0.4 oz)  Body mass index is 52.23 kg/m².    SpO2: 97 %         Intake/Output Summary (Last 24 hours) at 2024 0912  Last data filed at 2024 0746  Gross per 24 hour   Intake --   Output 450 ml   Net -450 ml       Lines/Drains/Airways       Peripheral  Intravenous Line  Duration                  Peripheral IV - Single Lumen 18 G Right Antecubital -- days                     Physical Exam  Vitals and nursing note reviewed.   Constitutional:       General: He is not in acute distress.     Appearance: He is well-developed. He is not diaphoretic.   HENT:      Head: Normocephalic and atraumatic.   Eyes:      General: Scleral icterus present.         Right eye: No discharge.         Left eye: No discharge.      Pupils: Pupils are equal, round, and reactive to light.   Cardiovascular:      Rate and Rhythm: Regular rhythm. Tachycardia present.      Heart sounds: Normal heart sounds, S1 normal and S2 normal. No murmur heard.  Pulmonary:      Effort: Pulmonary effort is normal. No respiratory distress.      Breath sounds: Normal breath sounds. No rales.   Abdominal:      General: There is distension.   Musculoskeletal:      Right lower leg: Edema present.      Left lower leg: Edema present.   Skin:     General: Skin is warm.      Coloration: Skin is jaundiced.      Findings: No erythema.   Neurological:      Mental Status: He is alert and oriented to person, place, and time.   Psychiatric:         Behavior: Behavior normal.          Significant Labs: CMP   Recent Labs   Lab 11/17/24 2143 11/18/24  0616   * 122*   K 3.1* 3.2*   CL 83* 83*   CO2 27 25   * 108   BUN 12 13   CREATININE 1.2 1.2   CALCIUM 8.3* 8.5*   PROT 7.0 6.8   ALBUMIN 2.1* 2.0*   BILITOT 21.5* 21.3*   ALKPHOS 227* 212*   * 155*   ALT 55* 59*   ANIONGAP 12 14   , CBC   Recent Labs   Lab 11/17/24  2143 11/18/24  0616   WBC 8.64 8.56   HGB 10.1* 9.6*   HCT 28.2* 26.5*   * 126*   , Troponin   Recent Labs   Lab 11/17/24  1139   TROPONINI 0.03   , and All pertinent lab results from the last 24 hours have been reviewed.    Significant Imaging: Echocardiogram: Transthoracic echo (TTE) complete (Cupid Only):   Results for orders placed or performed during the hospital encounter of 11/17/24    Echo   Result Value Ref Range    BSA 2.92 m2    LVOT stroke volume 109.3 cm3    LVIDd 5.4 3.5 - 6.0 cm    LV Systolic Volume 43.50 mL    LV Systolic Volume Index 16.2 mL/m2    LVIDs 3.3 2.1 - 4.0 cm    LV Diastolic Volume 142.62 mL    LV Diastolic Volume Index 53.02 mL/m2    Left Ventricular End Systolic Volume by Teichholz Method 43.50 mL    Left Ventricular End Diastolic Volume by Teichholz Method 142.62 mL    IVS 1.3 (A) 0.6 - 1.1 cm    LVOT diameter 2.0 cm    LVOT area 3.1 cm2    FS 38.9 28 - 44 %    Left Ventricle Relative Wall Thickness 0.52 cm    PW 1.4 (A) 0.6 - 1.1 cm    LV mass 311.7 g    LV Mass Index 115.9 g/m2    MV Peak E Bruce 1.19 m/s    TDI LATERAL 0.16 m/s    TDI SEPTAL 0.14 m/s    E/E' ratio 7.93 m/s    MV Peak A Bruce 0.70 m/s    TR Max Bruce 2.66 m/s    E/A ratio 1.70     IVRT 53.28 msec    E wave deceleration time 219.92 msec    LV SEPTAL E/E' RATIO 8.50 m/s    LV LATERAL E/E' RATIO 7.44 m/s    LVOT peak bruce 1.9 m/s    Left Ventricular Outflow Tract Mean Velocity 1.63 cm/s    Left Ventricular Outflow Tract Mean Gradient 10.90 mmHg    RVOT peak VTI 23.4 cm    TAPSE 2.70 cm    LA size 4.26 cm    Left Atrium Minor Axis 5.36 cm    Left Atrium Major Axis 5.64 cm    RA Major Axis 4.52 cm    AV mean gradient 11.4 mmHg    AV peak gradient 19.4 mmHg    Ao peak bruce 2.2 m/s    Ao VTI 39.6 cm    LVOT peak VTI 34.8 cm    AV valve area 2.8 cm²    AV Velocity Ratio 0.86     AV index (prosthetic) 0.88      by Velocity Ratio 2.7 cm²    Mr max bruce 4.21 m/s    MV stenosis pressure 1/2 time 63.78 ms    MV valve area p 1/2 method 3.45 cm2    Triscuspid Valve Regurgitation Peak Gradient 28 mmHg    PV mean gradient 6 mmHg    PV peak gradient 7     RVOT peak bruce 1.35 m/s    Ao root annulus 3.57 cm    STJ 3.14 cm    Ascending aorta 3.12 cm    IVC diameter 1.52 cm    Mean e' 0.15 m/s    ZLVIDS -11.62     ZLVIDD -15.21     DARLINE 27.4 mL/m2    LA Vol 73.64 cm3    LA WIDTH 3.7 cm    RA Width 3.2 cm    and EKG:  Reviewed  Assessment and Plan:   Patient who presents with SOB secondary to cirrhosis/fluid overload. Continue IV diuresis. Hepatology consulted. TTE pending.     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 I will follow-up with patient. Please contact us if you have any additional questions.    Xiao Salazar PA-C  Cardiology   [facility] Med [facility]

## 2024-11-18 NOTE — ASSESSMENT & PLAN NOTE
Chronic, controlled.  Latest blood pressure and vitals reviewed-   Temp:  [97.5 °F (36.4 °C)-98.1 °F (36.7 °C)]   Pulse:  []   Resp:  [18-20]   BP: (111-136)/(53-60)   SpO2:  [93 %-99 %] .   Home meds for hypertension were reviewed and noted below.     While in the hospital, will manage blood pressure as follows; Adjust home antihypertensive regimen as follows- resume home metoprolol once evaluated by Cardiology.  Currently on IV Lasix p.r.n. for chf workup.    Will utilize p.r.n. blood pressure medication only if patient's blood pressure greater than  180/110 and he develops symptoms such as worsening chest pain or shortness of breath.

## 2024-11-18 NOTE — PLAN OF CARE
Discussed poc with pt, pt verbalized understanding    Purposeful rounding every 2hours    VS wnl  Cardiac monitoring in use, pt is , tele monitor # [id number]    Fall precautions in place, remains injury free  Pt denies c/o pain at this time.  Pain  under control with PRN meds      Accurate I&Os    Bed locked at lowest position  Call light within reach    Chart check complete  Will cont with POC

## 2024-11-18 NOTE — ASSESSMENT & PLAN NOTE
Patient presented with worsening dyspnea/shortness a breath along with bilateral lower extremity edema in setting of noncompliance with home medications which include metoprolol and furosemide.  Patient also currently consumption alcohol.  Workup at outside facility revealed patient to have evidence of bilateral pleural effusions with pulmonary edema noted on chest x-ray.  Currently saturating 99% on room air in no acute distress.  H/H 10.1/28.2.  BNP elevated at 771.  Echo noted on 09/11/2023 revealed EF 55-65%.  Symptoms likely multifactorial in nature given underlying liver cirrhosis/PBC and possible new onset heart failure. Patient received Lasix 80 mg IV x1 prior to transfer with improvement in symptoms. Cardiology consulted and awaiting further evaluation/recommendations.  Plan:  -NPO  -telemetry  -monitor Is/Os  -daily weights   -f/u echo  -IV diuretics p.r.n.   -titrate oxygen therapy as needed  -incentive spirometry  -duo nebs p.r.n.  -f/u cardiology

## 2024-11-18 NOTE — ASSESSMENT & PLAN NOTE
Patient with known Cirrhosis with Child's class C. Co-morbidities are present and inclusive of portal hypertension and esophageal varices.  Hepatitis panel negative on 02/13/2024.  ELIEL, AFP negative on 02/16/2024.  Mitochondrial M2 antibody elevated at 29.0 on 02/16/2024.  MELD-Na score calculated; MELD 3.0: 31 at 11/18/2024  6:16 AM  MELD-Na: 32 at 11/18/2024  6:16 AM  Calculated from:  Serum Creatinine: 1.2 mg/dL at 11/18/2024  6:16 AM  Serum Sodium: 122 mmol/L (Using min of 125 mmol/L) at 11/18/2024  6:16 AM  Total Bilirubin: 21.3 mg/dL at 11/18/2024  6:16 AM  Serum Albumin: 2 g/dL at 11/18/2024  6:16 AM  INR(ratio): 1.9 at 11/17/2024  9:43 PM  Age at listing (hypothetical): 39 years  Sex: Male at 11/18/2024  6:16 AM    Continue chronic meds. 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 Will avoid any hepatotoxic meds, and monitor CBC/CMP/INR for synthetic function. Hepatology consulted and awaiting further evaluation/recommendations.    Cont diuresis, add lactulose  Triple vitamins

## 2024-11-18 NOTE — SUBJECTIVE & OBJECTIVE
Past Medical History:   Diagnosis Date    Alcohol abuse     Anasarca     GERD (gastroesophageal reflux disease)     Hypertension     Obesity, unspecified     Unspecified cirrhosis of liver     Vitamin D deficiency        Past Surgical History:   Procedure Laterality Date    ORIF, FRACTURE, ANKLE, BIMALLEOLAR Right        Review of patient's allergies indicates:  No Known Allergies    No current facility-administered medications on file prior to encounter.     No current outpatient medications on file prior to encounter.     Family History       Problem Relation (Age of Onset)    Breast cancer Mother    Diabetes Father          Tobacco Use    Smoking status: Former     Current packs/day: 0.00     Types: Cigarettes     Quit date: 2023     Years since quittin.3    Smokeless tobacco: Never   Substance and Sexual Activity    Alcohol use: Not Currently     Alcohol/week: 5.0 standard drinks of alcohol     Types: 5 Drinks containing 0.5 oz of alcohol per week     Comment: reports last drink     Drug use: Not Currently    Sexual activity: Yes     Review of Systems   All other systems reviewed and are negative.    Objective:     Vital Signs (Most Recent):  Temp: 97.8 °F (36.6 °C) (24)  Pulse: 95 (24)  Resp: 20 (24)  BP: (!) 111/53 (24)  SpO2: 99 % (24) Vital Signs (24h Range):  Temp:  [97.8 °F (36.6 °C)-98.1 °F (36.7 °C)] 97.8 °F (36.6 °C)  Pulse:  [93-95] 95  Resp:  [18-20] 20  SpO2:  [98 %-99 %] 99 %  BP: (111-133)/(53-63) 111/53     Weight: (!) 172.4 kg (380 lb 1.2 oz)  Body mass index is 54.53 kg/m².     Physical Exam  Vitals reviewed.   Constitutional:       General: He is not in acute distress.     Appearance: Normal appearance. He is obese. He is ill-appearing. He is not toxic-appearing or diaphoretic.   HENT:      Head: Normocephalic and atraumatic.      Right Ear: External ear normal.      Left Ear: External ear normal.      Nose: Nose normal.  No congestion or rhinorrhea.      Mouth/Throat:      Mouth: Mucous membranes are moist.      Pharynx: Oropharynx is clear. No oropharyngeal exudate or posterior oropharyngeal erythema.   Eyes:      General: Scleral icterus present.      Extraocular Movements: Extraocular movements intact.      Conjunctiva/sclera: Conjunctivae normal.      Pupils: Pupils are equal, round, and reactive to light.   Neck:      Vascular: No carotid bruit.   Cardiovascular:      Rate and Rhythm: Normal rate and regular rhythm.      Pulses: Normal pulses.      Heart sounds: Normal heart sounds. No murmur heard.     No friction rub. No gallop.   Pulmonary:      Effort: Pulmonary effort is normal. No respiratory distress.      Breath sounds: No stridor. No wheezing, rhonchi or rales.      Comments: Diminished breath sounds noted bilaterally throughout with scant crackles noted in bases.  Chest:      Chest wall: No tenderness.   Abdominal:      General: Abdomen is flat. Bowel sounds are normal. There is no distension.      Palpations: Abdomen is soft. There is no mass.      Tenderness: There is no abdominal tenderness. There is no right CVA tenderness, left CVA tenderness, guarding or rebound.      Hernia: No hernia is present.   Musculoskeletal:         General: No swelling, tenderness, deformity or signs of injury. Normal range of motion.      Cervical back: Normal range of motion and neck supple. No rigidity or tenderness.      Right lower leg: No edema.      Left lower leg: No edema.   Lymphadenopathy:      Cervical: No cervical adenopathy.   Skin:     General: Skin is warm and dry.      Capillary Refill: Capillary refill takes less than 2 seconds.      Coloration: Skin is jaundiced. Skin is not pale.      Findings: No bruising, erythema, lesion or rash.   Neurological:      General: No focal deficit present.      Mental Status: He is alert and oriented to person, place, and time. Mental status is at baseline.      Cranial Nerves: No  cranial nerve deficit.      Sensory: No sensory deficit.      Motor: No weakness.      Coordination: Coordination normal.   Psychiatric:         Mood and Affect: Mood normal.         Behavior: Behavior normal.         Thought Content: Thought content normal.         Judgment: Judgment normal.              CRANIAL NERVES     CN III, IV, VI   Pupils are equal, round, and reactive to light.       Significant Labs: All pertinent labs within the past 24 hours have been reviewed.    Significant Imaging: I have reviewed all pertinent imaging results/findings within the past 24 hours.    LABS:  Recent Results (from the past 24 hours)   PROTIME-INR    Collection Time: 11/17/24 11:39 AM   Result Value Ref Range    PT 22.5 (H) 11.5 - 15.3 SECONDS    INR 2.0    CK    Collection Time: 11/17/24 11:39 AM   Result Value Ref Range     (H) 30 - 200 U/L   B-TYPE NATRIURETIC PEPTIDE    Collection Time: 11/17/24 11:39 AM   Result Value Ref Range     (H) 15 - 73 PG/ML   TROPONIN    Collection Time: 11/17/24 11:39 AM   Result Value Ref Range    Troponin I 0.03 <0.01 - 0.03 ng/mL       RADIOLOGY  X-Ray Chest 1 View    Result Date: 11/17/2024  XR CHEST 1 VIEW HISTORY:  shortness of breath COMPARISON: February 13, 2024. Single frontal view of the chest.    1.  Mild cardiomegaly vascular congestion appear slightly improved. Central peribronchial thickening with bilateral mid and lower lung groundglass, alveolar and confluent airspace opacities are now present, greater on the right. Small bilateral pleural effusions are now present. 2.  The above findings may represent edema/CHF with partial correction, although infection cannot be excluded. Correlation clinical findings and follow-up is recommended. 3.  The osseous and soft tissues show no acute finding.    US Abdomen Complete    Result Date: 11/1/2024  Abdominal ultrasound INDICATION:            K74.3:Primary biliary cirrhosis (HCC) Comparison: Abdominal MRI 3/12/2024 There is  cirrhotic liver morphology. The liver measures 20 cm in the midclavicular line.  There is no evidence of liver mass. There is hepatopedal flow in the main portal vein. There is recanalization of the umbilical vein. The visualized portions of the pancreas, aorta, and IVC are unremarkable.   The common bile duct is not visualized. No gallstones, gallbladder wall thickening, or pericholecystic fluid. The kidneys are normal in size measuring 11.1 cm in length on the right and 11.3 cm in length on the left. Cortical echotexture is normal. No solid mass, shadowing calculus, or hydronephrosis is seen. The spleen is mildly enlarged at 15 cm in length. No evidence of splenic mass.    Cirrhosis. No definite liver mass is seen. Visualization of the liver is limited due to the severe diffuse hyperechogenicity of the liver, which is unlikely due to hepatic steatosis since no significant fat was seen on the recent prior MRI. No evidence of ascites. Splenomegaly and recanalization of the umbilical vein are compatible with portal hypertension. Limited study due to nonvisualization of the common bile duct.      EKG    MICROBIOLOGY    MDM

## 2024-11-18 NOTE — ASSESSMENT & PLAN NOTE
Body mass index is 54.53 kg/m². Morbid obesity complicates all aspects of disease management from diagnostic modalities to treatment. Weight loss encouraged and health benefits explained to patient.

## 2024-11-18 NOTE — HOSPITAL COURSE
39 year old male patient w ETOH abuse, GERD, HTN, obesity, and Alcoholic Cirrhosis/hx of PBC, who presented to [facility] yesterday to increased SOB over the past two weeks. Associated symptoms included BLE edema, chronic abdominal bloating, orthopnea, and PND. He denied any associated reese CP, palpitations, near syncope, or syncope. Initial workup at outside facility revealed       Cardiology consulted-  medication adjustments recommendations made.  Hepatology consulted- will make medication adjustments and obtain recommended imaging/testing  Echo- Normal systolic function, ejection fraction is approximately 65%. There is normal diastolic function.  US Abd- No significant ascites  IV Lasix and Rocephin for SBP prevention.     Sitting in bed, alert but confused. Dyspnea noted with speech. Tachycardia noted, SBP-90's/30's-50's. Very jaundiced. Continuing IV lasix, potassium repleted 40 mEq PO TID, strict fluid restriction maintained.    Ammonia high- decr to 57 from 83. Na-123, Cl-83, Alb 2, T bili 23.8, will maintain lactulose at 20mg TID, may add Rifaximin 550mg Q12 after checking with hepatology. Cont IV lasix, strict fluid restriction.

## 2024-11-18 NOTE — SUBJECTIVE & OBJECTIVE
Interval History: Appreciate Cardiology and Hepatology input- Looks deeply jaundiced, but otherwise comfortable on RA. AAOx4, no asterixis. Gets dyspneic while talking. Started on IV Lasix and Rocephin for SBP prevention. Abdomen distended, will get US Abd to r/o ascites and may need paracentesis. Ammonia high- 83, Na and Cl very low- 123/83, Alb 2, T bili 21, direct 14- will add lactulose and may add Rifaximin. Cont IV lasix, strict fluid restriction. May need Aldactone and Inderal. Will d/w Hepatology.      Review of Systems   Constitutional:  Positive for activity change, appetite change and fatigue. Negative for chills, diaphoresis and fever.   HENT: Negative.  Negative for congestion, ear pain, facial swelling, hearing loss, nosebleeds, postnasal drip, rhinorrhea, sinus pressure, sneezing, sore throat, trouble swallowing and voice change.    Eyes: Negative.  Negative for pain and redness.   Respiratory:  Positive for shortness of breath. Negative for cough, chest tightness and wheezing.    Cardiovascular:  Positive for leg swelling. Negative for chest pain and palpitations.   Gastrointestinal:  Negative for abdominal pain, blood in stool, constipation, diarrhea, nausea and vomiting.   Endocrine: Negative for cold intolerance, heat intolerance, polydipsia, polyphagia and polyuria.   Genitourinary:  Negative for difficulty urinating, dysuria, flank pain, frequency, hematuria, scrotal swelling, testicular pain and urgency.   Musculoskeletal:  Negative for arthralgias, back pain, gait problem, joint swelling, myalgias, neck pain and neck stiffness.   Skin:  Negative for pallor, rash and wound.   Allergic/Immunologic: Negative for environmental allergies and food allergies.   Neurological:  Positive for weakness. Negative for dizziness, tremors, seizures, syncope, facial asymmetry, speech difficulty, light-headedness, numbness and headaches.   Hematological: Negative.  Does not bruise/bleed easily.    Psychiatric/Behavioral: Negative.     All other systems reviewed and are negative.    Objective:     Vital Signs (Most Recent):  Temp: 97.8 °F (36.6 °C) (11/18/24 1611)  Pulse: 105 (11/18/24 1611)  Resp: 18 (11/18/24 1611)  BP: 136/60 (11/18/24 1611)  SpO2: (!) 93 % (11/18/24 1611) Vital Signs (24h Range):  Temp:  [97.5 °F (36.4 °C)-98.1 °F (36.7 °C)] 97.8 °F (36.6 °C)  Pulse:  [] 105  Resp:  [18-20] 18  SpO2:  [93 %-99 %] 93 %  BP: (111-136)/(53-60) 136/60     Weight: (!) 165.1 kg (364 lb 0.4 oz)  Body mass index is 52.23 kg/m².    Intake/Output Summary (Last 24 hours) at 11/18/2024 1731  Last data filed at 11/18/2024 1107  Gross per 24 hour   Intake --   Output 600 ml   Net -600 ml         Physical Exam  Vitals reviewed.   Constitutional:       General: He is not in acute distress.     Appearance: Normal appearance. He is obese. He is ill-appearing. He is not toxic-appearing or diaphoretic.   HENT:      Head: Normocephalic and atraumatic.      Right Ear: External ear normal.      Left Ear: External ear normal.      Nose: Nose normal. No congestion or rhinorrhea.      Mouth/Throat:      Mouth: Mucous membranes are moist.      Pharynx: Oropharynx is clear. No oropharyngeal exudate or posterior oropharyngeal erythema.   Eyes:      General: Scleral icterus present.      Extraocular Movements: Extraocular movements intact.      Conjunctiva/sclera: Conjunctivae normal.      Pupils: Pupils are equal, round, and reactive to light.   Neck:      Vascular: No carotid bruit.   Cardiovascular:      Rate and Rhythm: Normal rate and regular rhythm.      Pulses: Normal pulses.      Heart sounds: Normal heart sounds. No murmur heard.     No friction rub. No gallop.   Pulmonary:      Effort: Pulmonary effort is normal. No respiratory distress.      Breath sounds: No stridor. No wheezing, rhonchi or rales.      Comments: Diminished breath sounds noted bilaterally throughout with scant crackles noted in bases.  Chest:       Chest wall: No tenderness.   Abdominal:      General: Abdomen is flat. Bowel sounds are normal. There is distension.      Palpations: Abdomen is soft. There is no mass.      Tenderness: There is no abdominal tenderness. There is no right CVA tenderness, left CVA tenderness, guarding or rebound.      Hernia: No hernia is present.   Genitourinary:     Comments: deferred  Musculoskeletal:         General: No swelling, tenderness, deformity or signs of injury. Normal range of motion.      Cervical back: Normal range of motion and neck supple. No rigidity or tenderness.      Right lower leg: No edema.      Left lower leg: No edema.   Lymphadenopathy:      Cervical: No cervical adenopathy.   Skin:     General: Skin is warm and dry.      Capillary Refill: Capillary refill takes less than 2 seconds.      Coloration: Skin is jaundiced. Skin is not pale.      Findings: No bruising, erythema, lesion or rash.   Neurological:      General: No focal deficit present.      Mental Status: He is alert and oriented to person, place, and time. Mental status is at baseline.      Cranial Nerves: No cranial nerve deficit.      Sensory: No sensory deficit.      Motor: No weakness.      Coordination: Coordination normal.   Psychiatric:         Mood and Affect: Mood normal.         Behavior: Behavior normal.         Thought Content: Thought content normal.         Judgment: Judgment normal.       Results for orders placed or performed during the hospital encounter of 11/17/24   Comprehensive Metabolic Panel (CMP)    Collection Time: 11/17/24  9:43 PM   Result Value Ref Range    Sodium 122 (L) 136 - 145 mmol/L    Potassium 3.1 (L) 3.5 - 5.1 mmol/L    Chloride 83 (L) 95 - 110 mmol/L    CO2 27 23 - 29 mmol/L    Glucose 120 (H) 70 - 110 mg/dL    BUN 12 6 - 20 mg/dL    Creatinine 1.2 0.5 - 1.4 mg/dL    Calcium 8.3 (L) 8.7 - 10.5 mg/dL    Total Protein 7.0 6.0 - 8.4 g/dL    Albumin 2.1 (L) 3.5 - 5.2 g/dL    Total Bilirubin 21.5 (H) 0.1 - 1.0 mg/dL     Alkaline Phosphatase 227 (H) 40 - 150 U/L     (H) 10 - 40 U/L    ALT 55 (H) 10 - 44 U/L    eGFR >60 >60 mL/min/1.73 m^2    Anion Gap 12 8 - 16 mmol/L   CBC with Automated Differential    Collection Time: 11/17/24  9:43 PM   Result Value Ref Range    WBC 8.64 3.90 - 12.70 K/uL    RBC 3.20 (L) 4.60 - 6.20 M/uL    Hemoglobin 10.1 (L) 14.0 - 18.0 g/dL    Hematocrit 28.2 (L) 40.0 - 54.0 %    MCV 88 82 - 98 fL    MCH 31.6 (H) 27.0 - 31.0 pg    MCHC 35.8 32.0 - 36.0 g/dL    RDW 24.1 (H) 11.5 - 14.5 %    Platelets 131 (L) 150 - 450 K/uL    MPV 10.6 9.2 - 12.9 fL    Immature Granulocytes 1.5 (H) 0.0 - 0.5 %    Gran # (ANC) 6.8 1.8 - 7.7 K/uL    Immature Grans (Abs) 0.13 (H) 0.00 - 0.04 K/uL    Lymph # 0.4 (L) 1.0 - 4.8 K/uL    Mono # 1.2 (H) 0.3 - 1.0 K/uL    Eos # 0.1 0.0 - 0.5 K/uL    Baso # 0.02 0.00 - 0.20 K/uL    nRBC 0 0 /100 WBC    Gran % 78.8 (H) 38.0 - 73.0 %    Lymph % 4.4 (L) 18.0 - 48.0 %    Mono % 14.1 4.0 - 15.0 %    Eosinophil % 1.0 0.0 - 8.0 %    Basophil % 0.2 0.0 - 1.9 %    Differential Method Automated    Brain natriuretic peptide    Collection Time: 11/17/24  9:43 PM   Result Value Ref Range     (H) 0 - 99 pg/mL   Ammonia    Collection Time: 11/17/24  9:43 PM   Result Value Ref Range    Ammonia 83 (H) 10 - 50 umol/L   Bilirubin, direct    Collection Time: 11/17/24  9:43 PM   Result Value Ref Range    Bilirubin, Direct >14.0 (H) 0.1 - 0.3 mg/dL   Lactic Acid, Plasma    Collection Time: 11/17/24  9:43 PM   Result Value Ref Range    Lactate (Lactic Acid) 2.0 0.5 - 2.2 mmol/L   Procalcitonin    Collection Time: 11/17/24  9:43 PM   Result Value Ref Range    Procalcitonin 0.62 (H) <0.25 ng/mL   Protime-INR    Collection Time: 11/17/24  9:43 PM   Result Value Ref Range    Prothrombin Time 19.7 (H) 9.0 - 12.5 sec    INR 1.9 (H) 0.8 - 1.2   APTT    Collection Time: 11/17/24  9:43 PM   Result Value Ref Range    aPTT 39.3 (H) 21.0 - 32.0 sec   Comprehensive Metabolic Panel (CMP)    Collection Time:  11/18/24  6:16 AM   Result Value Ref Range    Sodium 122 (L) 136 - 145 mmol/L    Potassium 3.2 (L) 3.5 - 5.1 mmol/L    Chloride 83 (L) 95 - 110 mmol/L    CO2 25 23 - 29 mmol/L    Glucose 108 70 - 110 mg/dL    BUN 13 6 - 20 mg/dL    Creatinine 1.2 0.5 - 1.4 mg/dL    Calcium 8.5 (L) 8.7 - 10.5 mg/dL    Total Protein 6.8 6.0 - 8.4 g/dL    Albumin 2.0 (L) 3.5 - 5.2 g/dL    Total Bilirubin 21.3 (H) 0.1 - 1.0 mg/dL    Alkaline Phosphatase 212 (H) 40 - 150 U/L     (H) 10 - 40 U/L    ALT 59 (H) 10 - 44 U/L    eGFR >60 >60 mL/min/1.73 m^2    Anion Gap 14 8 - 16 mmol/L   CBC with Automated Differential    Collection Time: 11/18/24  6:16 AM   Result Value Ref Range    WBC 8.56 3.90 - 12.70 K/uL    RBC 3.05 (L) 4.60 - 6.20 M/uL    Hemoglobin 9.6 (L) 14.0 - 18.0 g/dL    Hematocrit 26.5 (L) 40.0 - 54.0 %    MCV 87 82 - 98 fL    MCH 31.5 (H) 27.0 - 31.0 pg    MCHC 36.2 (H) 32.0 - 36.0 g/dL    RDW 24.0 (H) 11.5 - 14.5 %    Platelets 126 (L) 150 - 450 K/uL    MPV 10.3 9.2 - 12.9 fL    Immature Granulocytes 1.5 (H) 0.0 - 0.5 %    Gran # (ANC) 6.6 1.8 - 7.7 K/uL    Immature Grans (Abs) 0.13 (H) 0.00 - 0.04 K/uL    Lymph # 0.5 (L) 1.0 - 4.8 K/uL    Mono # 1.2 (H) 0.3 - 1.0 K/uL    Eos # 0.1 0.0 - 0.5 K/uL    Baso # 0.03 0.00 - 0.20 K/uL    nRBC 0 0 /100 WBC    Gran % 76.7 (H) 38.0 - 73.0 %    Lymph % 5.6 (L) 18.0 - 48.0 %    Mono % 14.3 4.0 - 15.0 %    Eosinophil % 1.5 0.0 - 8.0 %    Basophil % 0.4 0.0 - 1.9 %    Differential Method Automated    Magnesium    Collection Time: 11/18/24  6:16 AM   Result Value Ref Range    Magnesium 1.6 1.6 - 2.6 mg/dL   Echo    Collection Time: 11/18/24 11:37 AM   Result Value Ref Range    BSA 2.92 m2    LVOT stroke volume 109.3 cm3    LVIDd 5.4 3.5 - 6.0 cm    LV Systolic Volume 43.50 mL    LV Systolic Volume Index 16.2 mL/m2    LVIDs 3.3 2.1 - 4.0 cm    LV Diastolic Volume 142.62 mL    LV Diastolic Volume Index 53.02 mL/m2    Left Ventricular End Systolic Volume by Teichholz Method 43.50 mL     Left Ventricular End Diastolic Volume by Teichholz Method 142.62 mL    IVS 1.3 (A) 0.6 - 1.1 cm    LVOT diameter 2.0 cm    LVOT area 3.1 cm2    FS 38.9 28 - 44 %    Left Ventricle Relative Wall Thickness 0.52 cm    PW 1.4 (A) 0.6 - 1.1 cm    LV mass 311.7 g    LV Mass Index 115.9 g/m2    MV Peak E Bruce 1.19 m/s    TDI LATERAL 0.16 m/s    TDI SEPTAL 0.14 m/s    E/E' ratio 7.93 m/s    MV Peak A Bruce 0.70 m/s    TR Max Bruce 2.66 m/s    E/A ratio 1.70     IVRT 53.28 msec    E wave deceleration time 219.92 msec    LV SEPTAL E/E' RATIO 8.50 m/s    LV LATERAL E/E' RATIO 7.44 m/s    LVOT peak bruce 1.9 m/s    Left Ventricular Outflow Tract Mean Velocity 1.63 cm/s    Left Ventricular Outflow Tract Mean Gradient 10.90 mmHg    RVOT peak VTI 23.4 cm    TAPSE 2.70 cm    LA size 4.26 cm    Left Atrium Minor Axis 5.36 cm    Left Atrium Major Axis 5.64 cm    RA Major Axis 4.52 cm    AV mean gradient 11.4 mmHg    AV peak gradient 19.4 mmHg    Ao peak bruce 2.2 m/s    Ao VTI 39.6 cm    LVOT peak VTI 34.8 cm    AV valve area 2.8 cm²    AV Velocity Ratio 0.86     AV index (prosthetic) 0.88      by Velocity Ratio 2.7 cm²    Mr max bruce 4.21 m/s    MV stenosis pressure 1/2 time 63.78 ms    MV valve area p 1/2 method 3.45 cm2    Triscuspid Valve Regurgitation Peak Gradient 28 mmHg    PV mean gradient 6 mmHg    RVOT peak bruce 1.35 m/s    Ao root annulus 3.57 cm    STJ 3.14 cm    Ascending aorta 3.12 cm    IVC diameter 1.52 cm    Mean e' 0.15 m/s    ZLVIDS -11.62     ZLVIDD -15.21     DARLINE 27.4 mL/m2    LA Vol 73.64 cm3    LA WIDTH 3.7 cm    RA Width 3.2 cm    EF 65 %    TV resting pulmonary artery pressure 31 mmHg    RV TB RVSP 6 mmHg    Est. RA pres 3 mmHg        Significant Labs: All pertinent labs within the past 24 hours have been reviewed.     Significant Imaging: I have reviewed all pertinent imaging results/findings within the past 24 hours.

## 2024-11-18 NOTE — ASSESSMENT & PLAN NOTE
Chronic. Stable. Currently asymptomatic. Home medications include PPI/Antacids as needed.  Plan:  -Continue PPI/Antacids

## 2024-11-18 NOTE — PROGRESS NOTES
"[facility]) nurse navigator notified of HFT[facility] candidate in need of education and introduction to 4-6 week program.      PT aao x 3 while lying in bed . Introduced self to pt as HFT[facility] nurse navigator.     Patient given "Home Care Guide for Heart Failure Patients" , "[facility]" flyer and "Daily weight and symptom tracker".  Encouraged pt  to review information.      Reviewed the following key points of HFT[facility] program with pt and family:   1.) Take your medications as directed.    2.) Weight yourself daily   3.) Follow low salt and limited fluid diet.    4.) Stop smoking and start exercising   5.) Go to your appointments and call your team.      Pt reminded to follow Symptom tracker and to call at the onset of symptoms according to tracker.     Reviewed plan for follow up once discharged to include phone calls, in person and virtual visits to assist pt optimizing their heart failure medication regimen and encouraging healthy lifestyle modifications.  Reminded pt that program will assist them over the next 4-6 weeks and then patient will be transferred to long term care provider .  Reminded pt how to contact HFT[facility] navigator via phone and or via viVood.     No appt made at this time. Pt will call to schedule if he does not go on dialysis.    Pt also reminded HF nurse will call 48-72 hours after discharge to check on them.     PT verbalize read back of information given.  Encouraged pt and family to read over information often and contact team with any questions or concerns.      "

## 2024-11-18 NOTE — SUBJECTIVE & OBJECTIVE
Past Medical History:   Diagnosis Date    Alcohol abuse     Anasarca     GERD (gastroesophageal reflux disease)     Hypertension     Obesity, unspecified     Unspecified cirrhosis of liver     Vitamin D deficiency        Past Surgical History:   Procedure Laterality Date    ORIF, FRACTURE, ANKLE, BIMALLEOLAR Right        Review of patient's allergies indicates:  No Known Allergies    No current facility-administered medications on file prior to encounter.     No current outpatient medications on file prior to encounter.     Family History       Problem Relation (Age of Onset)    Breast cancer Mother    Diabetes Father          Tobacco Use    Smoking status: Former     Current packs/day: 0.00     Types: Cigarettes     Quit date: 2023     Years since quittin.3    Smokeless tobacco: Never   Substance and Sexual Activity    Alcohol use: Not Currently     Alcohol/week: 5.0 standard drinks of alcohol     Types: 5 Drinks containing 0.5 oz of alcohol per week     Comment: reports last drink     Drug use: Not Currently    Sexual activity: Yes     Review of Systems   Constitutional: Positive for malaise/fatigue and weight gain.   HENT: Negative.     Eyes: Negative.    Cardiovascular:  Positive for dyspnea on exertion, leg swelling, orthopnea and paroxysmal nocturnal dyspnea.   Respiratory:  Positive for shortness of breath.    Endocrine: Negative.    Hematologic/Lymphatic: Negative.    Skin:  Positive for color change.   Musculoskeletal: Negative.    Gastrointestinal:  Positive for bloating.   Genitourinary: Negative.    Neurological: Negative.    Psychiatric/Behavioral: Negative.     Allergic/Immunologic: Negative.      Objective:     Vital Signs (Most Recent):  Temp: 98.1 °F (36.7 °C) (24)  Pulse: (!) 111 (24 0839)  Resp: 18 (24)  BP: (!) 124/58 (24)  SpO2: 97 % (24) Vital Signs (24h Range):  Temp:  [97.8 °F (36.6 °C)-98.1 °F (36.7 °C)] 98.1 °F (36.7  °C)  Pulse:  [] 111  Resp:  [18-20] 18  SpO2:  [95 %-99 %] 97 %  BP: (111-133)/(53-63) 124/58     Weight: (!) 165.1 kg (364 lb 0.4 oz)  Body mass index is 52.23 kg/m².    SpO2: 97 %         Intake/Output Summary (Last 24 hours) at 11/18/2024 0912  Last data filed at 11/18/2024 0746  Gross per 24 hour   Intake --   Output 450 ml   Net -450 ml       Lines/Drains/Airways       Peripheral Intravenous Line  Duration                  Peripheral IV - Single Lumen 18 G Right Antecubital -- days                     Physical Exam  Vitals and nursing note reviewed.   Constitutional:       General: He is not in acute distress.     Appearance: He is well-developed. He is not diaphoretic.   HENT:      Head: Normocephalic and atraumatic.   Eyes:      General: Scleral icterus present.         Right eye: No discharge.         Left eye: No discharge.      Pupils: Pupils are equal, round, and reactive to light.   Cardiovascular:      Rate and Rhythm: Regular rhythm. Tachycardia present.      Heart sounds: Normal heart sounds, S1 normal and S2 normal. No murmur heard.  Pulmonary:      Effort: Pulmonary effort is normal. No respiratory distress.      Breath sounds: Normal breath sounds. No rales.   Abdominal:      General: There is distension.   Musculoskeletal:      Right lower leg: Edema present.      Left lower leg: Edema present.   Skin:     General: Skin is warm.      Coloration: Skin is jaundiced.      Findings: No erythema.   Neurological:      Mental Status: He is alert and oriented to person, place, and time.   Psychiatric:         Behavior: Behavior normal.          Significant Labs: CMP   Recent Labs   Lab 11/17/24 2143 11/18/24  0616   * 122*   K 3.1* 3.2*   CL 83* 83*   CO2 27 25   * 108   BUN 12 13   CREATININE 1.2 1.2   CALCIUM 8.3* 8.5*   PROT 7.0 6.8   ALBUMIN 2.1* 2.0*   BILITOT 21.5* 21.3*   ALKPHOS 227* 212*   * 155*   ALT 55* 59*   ANIONGAP 12 14   , CBC   Recent Labs   Lab 11/17/24 2143  11/18/24  0616   WBC 8.64 8.56   HGB 10.1* 9.6*   HCT 28.2* 26.5*   * 126*   , Troponin   Recent Labs   Lab 11/17/24  1139   TROPONINI 0.03   , and All pertinent lab results from the last 24 hours have been reviewed.    Significant Imaging: Echocardiogram: Transthoracic echo (TTE) complete (Cupid Only):   Results for orders placed or performed during the hospital encounter of 11/17/24   Echo   Result Value Ref Range    BSA 2.92 m2    LVOT stroke volume 109.3 cm3    LVIDd 5.4 3.5 - 6.0 cm    LV Systolic Volume 43.50 mL    LV Systolic Volume Index 16.2 mL/m2    LVIDs 3.3 2.1 - 4.0 cm    LV Diastolic Volume 142.62 mL    LV Diastolic Volume Index 53.02 mL/m2    Left Ventricular End Systolic Volume by Teichholz Method 43.50 mL    Left Ventricular End Diastolic Volume by Teichholz Method 142.62 mL    IVS 1.3 (A) 0.6 - 1.1 cm    LVOT diameter 2.0 cm    LVOT area 3.1 cm2    FS 38.9 28 - 44 %    Left Ventricle Relative Wall Thickness 0.52 cm    PW 1.4 (A) 0.6 - 1.1 cm    LV mass 311.7 g    LV Mass Index 115.9 g/m2    MV Peak E Bruce 1.19 m/s    TDI LATERAL 0.16 m/s    TDI SEPTAL 0.14 m/s    E/E' ratio 7.93 m/s    MV Peak A Bruce 0.70 m/s    TR Max Bruce 2.66 m/s    E/A ratio 1.70     IVRT 53.28 msec    E wave deceleration time 219.92 msec    LV SEPTAL E/E' RATIO 8.50 m/s    LV LATERAL E/E' RATIO 7.44 m/s    LVOT peak bruce 1.9 m/s    Left Ventricular Outflow Tract Mean Velocity 1.63 cm/s    Left Ventricular Outflow Tract Mean Gradient 10.90 mmHg    RVOT peak VTI 23.4 cm    TAPSE 2.70 cm    LA size 4.26 cm    Left Atrium Minor Axis 5.36 cm    Left Atrium Major Axis 5.64 cm    RA Major Axis 4.52 cm    AV mean gradient 11.4 mmHg    AV peak gradient 19.4 mmHg    Ao peak bruce 2.2 m/s    Ao VTI 39.6 cm    LVOT peak VTI 34.8 cm    AV valve area 2.8 cm²    AV Velocity Ratio 0.86     AV index (prosthetic) 0.88      by Velocity Ratio 2.7 cm²    Mr max bruce 4.21 m/s    MV stenosis pressure 1/2 time 63.78 ms    MV valve area p 1/2  method 3.45 cm2    Triscuspid Valve Regurgitation Peak Gradient 28 mmHg    PV mean gradient 6 mmHg    PV peak gradient 7     RVOT peak neha 1.35 m/s    Ao root annulus 3.57 cm    STJ 3.14 cm    Ascending aorta 3.12 cm    IVC diameter 1.52 cm    Mean e' 0.15 m/s    ZLVIDS -11.62     ZLVIDD -15.21     DARLINE 27.4 mL/m2    LA Vol 73.64 cm3    LA WIDTH 3.7 cm    RA Width 3.2 cm    and EKG: Reviewed

## 2024-11-18 NOTE — PLAN OF CARE
[facility] - Med Surg  Initial Discharge Assessment       Primary Care Provider: Katlin, Primary Doctor    Admission Diagnosis: Liver failure [K72.90]    Admission Date: 11/17/2024  Expected Discharge Date: Per Attending     Transition of Care Barriers: None    Payor: BLUE CROSS BLUE SHIELD / Plan: BCBS ALL OUT OF STATE / Product Type: PPO /     Extended Emergency Contact Information  Primary Emergency Contact: Billie Rosa  Mobile Phone: [phone]  Relation: Mother    Discharge Plan A: Home with family       No Pharmacies Listed    Initial Assessment (most recent)       Adult Discharge Assessment - 11/18/24 1131          Discharge Assessment    Assessment Type Discharge Planning Assessment     Confirmed/corrected address, phone number and insurance Yes   physical address: [id number], [place]    Confirmed Demographics Correct on Facesheet     Communicated KENNEY with patient/caregiver Date not available/Unable to determine     Reason For Admission SOB     People in Home significant other;sibling(s)     Do you expect to return to your current living situation? Yes     Do you have help at home or someone to help you manage your care at home? Yes     Who are your caregiver(s) and their phone number(s)? family     Prior to hospitilization cognitive status: Alert/Oriented     Current cognitive status: Alert/Oriented     Walking or Climbing Stairs Difficulty no     Dressing/Bathing Difficulty no     Home Layout Able to live on 1st floor     Equipment Currently Used at Home none     Readmission within 30 days? No     Patient currently being followed by outpatient case management? No     Do you currently have service(s) that help you manage your care at home? No     Do you take prescription medications? Yes     Do you have prescription coverage? Yes     Coverage BCBS     Do you have any problems affording any of your prescribed medications? No     Is the patient taking medications as prescribed? yes     Who is  going to help you get home at discharge? family     How do you get to doctors appointments? car, drives self;family or friend will provide     Are you on dialysis? No     Do you take coumadin? No     Discharge Plan A Home with family     DME Needed Upon Discharge  none     Discharge Plan discussed with: Patient     Transition of Care Barriers None                   Anticipated DC dispo: Home   Prior Level of Function: Independent   People in home:  Sibling, spouse     Comments:  CM met with patient at bedside to introduce role and discuss discharge planning. Family  will be help at home and can provide transport at time of discharge. CM discharge needs depends on hospital progress. CM will continue following to assist with other needs.

## 2024-11-18 NOTE — HPI
Marcellus Mendoza is a 39 y.o. male with a PMH  has a past medical history of Alcohol abuse, Anasarca, GERD (gastroesophageal reflux disease), Hypertension, Obesity, unspecified, Unspecified cirrhosis of liver, and Vitamin D deficiency. who presented as a transfer from outside facility for higher level of care after patient was found to have evidence of acute CHF exacerbation as well as further evaluation by hepatology regarding decompensated liver cirrhosis and PBC.  Patient reports unchanged chronic abdominal pain from baseline but reported his dyspnea/shortness of breath and lower extremity swelling had progressively worsened over the past 2 weeks prompting him to go to ED for further evaluation.  He reported no known alleviating factors with aggravating factors including laying flat and with minimal exertion.  Patient reported he has not taken his urosdiol or furosemide over the past few months secondary to not having any refills and thought they were a 1 time prescription.  At time of bedside assessment, patient reported improvement in symptoms following administration of IV Lasix at outside facility and is asking how long he will be admitted here for.  Patient informed that will depend on his ongoing workup, recommendations from specialists, and response to his treatment.  Patient understands treatment plan moving forward in his patient awaiting to see recommended specialists.  Prior to onset of symptoms, patient reported being in his usual state of health with no other concerns or complaints.  All other review of systems negative except as noted above.  Patient admitted to Hospital Medicine inpatient for continued medical management.  Additional history as noted below per  physician at time of transfer.    39 with depression, FAWAD, HTN, alcoholic misuse, primary biliary cirrhosis, alcoholic cirrhosis with intermittent follow up with GI came in for SOB. He was found to have bilateral pleural effusion and  pulmonary edema. He has not taking urosdiol and furosemide for at least a couple months and is actively drinking. Bilirubin last checked was 7.8 an is now 20. INR 2. MELD 30. GI at sending facility recommended evaluation at facility with liver transplant capability. Dr. Macias, with heptology, aggreed to evaluate patient on medicine service.    vitals 98.1, 115, 36, 145/58, 79%    SCr 0.97, BUN 9, Na 123, K 3.3, Cl 84, CO2 25, glc 123, Ca 8.3, to prot 7.3., Aln 2.2, t bili 20.3, alk phos 248, , ALT 50, , , INR 2, WC 10, Hgb 105., Hct 29.3, MCV 90, plt 124, neut 84%      PCP: No, Primary Doctor

## 2024-11-18 NOTE — H&P
[facility]  History & Physical    Patient Name: Marcellus Mendoza  MRN: [id number]  Patient Class: IP- Inpatient  Admission Date: 11/17/2024  Attending Physician: Irma Napier MD   Primary Care Provider: [facility] Primary Doctor         Patient information was obtained from patient, past medical records, and ER records.     Subjective:     Principal Problem:Shortness of breath    Chief Complaint:   Chief Complaint   Patient presents with    Shortness of Breath        HPI: Marcellus Mendoza is a 39 y.o. male with a PMH  has a past medical history of Alcohol abuse, Anasarca, GERD (gastroesophageal reflux disease), Hypertension, Obesity, unspecified, Unspecified cirrhosis of liver, and Vitamin D deficiency. who presented as a transfer from outside facility for higher level of care after patient was found to have evidence of acute CHF exacerbation as well as further evaluation by hepatology regarding decompensated liver cirrhosis and PBC.  Patient reports unchanged chronic abdominal pain from baseline but reported his dyspnea/shortness of breath and lower extremity swelling had progressively worsened over the past 2 weeks prompting him to go to ED for further evaluation.  He reported no known alleviating factors with aggravating factors including laying flat and with minimal exertion.  Patient reported he has not taken his urosdiol or furosemide over the past few months secondary to not having any refills and thought they were a 1 time prescription.  At time of bedside assessment, patient reported improvement in symptoms following administration of IV Lasix at outside facility and is asking how long he will be admitted here for.  Patient informed that will depend on his ongoing workup, recommendations from specialists, and response to his treatment.  Patient understands treatment plan moving forward in his patient awaiting to see recommended specialists.  Prior to onset of symptoms, patient  reported being in his usual state of health with no other concerns or complaints.  All other review of systems negative except as noted above.  Patient admitted to Hospital Medicine inpatient for continued medical management.  Additional history as noted below per  physician at time of transfer.    39 with depression, FAWAD, HTN, alcoholic misuse, primary biliary cirrhosis, alcoholic cirrhosis with intermittent follow up with GI came in for SOB. He was found to have bilateral pleural effusion and pulmonary edema. He has not taking urosdiol and furosemide for at least a couple months and is actively drinking. Bilirubin last checked was 7.8 an is now 20. INR 2. MELD 30. GI at sending facility recommended evaluation at facility with liver transplant capability. Dr. Macias, with heptology, aggreed to evaluate patient on medicine service.    vitals 98.1, 115, 36, 145/58, 79%    SCr 0.97, BUN 9, Na 123, K 3.3, Cl 84, CO2 25, glc 123, Ca 8.3, to prot 7.3., Aln 2.2, t bili 20.3, alk phos 248, , ALT 50, , , INR 2, WC 10, Hgb 105., Hct 29.3, MCV 90, plt 124, neut 84%      PCP: No, Primary Doctor       Past Medical History:   Diagnosis Date    Alcohol abuse     Anasarca     GERD (gastroesophageal reflux disease)     Hypertension     Obesity, unspecified     Unspecified cirrhosis of liver     Vitamin D deficiency        Past Surgical History:   Procedure Laterality Date    ORIF, FRACTURE, ANKLE, BIMALLEOLAR Right        Review of patient's allergies indicates:  No Known Allergies    No current facility-administered medications on file prior to encounter.     No current outpatient medications on file prior to encounter.     Family History       Problem Relation (Age of Onset)    Breast cancer Mother    Diabetes Father          Tobacco Use    Smoking status: Former     Current packs/day: 0.00     Types: Cigarettes     Quit date: 2023     Years since quittin.3    Smokeless tobacco: Never   Substance  and Sexual Activity    Alcohol use: Not Currently     Alcohol/week: 5.0 standard drinks of alcohol     Types: 5 Drinks containing 0.5 oz of alcohol per week     Comment: reports last drink Friday, 11/8    Drug use: Not Currently    Sexual activity: Yes     Review of Systems   All other systems reviewed and are negative.    Objective:     Vital Signs (Most Recent):  Temp: 97.8 °F (36.6 °C) (11/17/24 2102)  Pulse: 95 (11/17/24 2102)  Resp: 20 (11/17/24 2102)  BP: (!) 111/53 (11/17/24 2102)  SpO2: 99 % (11/17/24 2102) Vital Signs (24h Range):  Temp:  [97.8 °F (36.6 °C)-98.1 °F (36.7 °C)] 97.8 °F (36.6 °C)  Pulse:  [93-95] 95  Resp:  [18-20] 20  SpO2:  [98 %-99 %] 99 %  BP: (111-133)/(53-63) 111/53     Weight: (!) 172.4 kg (380 lb 1.2 oz)  Body mass index is 54.53 kg/m².     Physical Exam  Vitals reviewed.   Constitutional:       General: He is not in acute distress.     Appearance: Normal appearance. He is obese. He is ill-appearing. He is not toxic-appearing or diaphoretic.   HENT:      Head: Normocephalic and atraumatic.      Right Ear: External ear normal.      Left Ear: External ear normal.      Nose: Nose normal. No congestion or rhinorrhea.      Mouth/Throat:      Mouth: Mucous membranes are moist.      Pharynx: Oropharynx is clear. No oropharyngeal exudate or posterior oropharyngeal erythema.   Eyes:      General: Scleral icterus present.      Extraocular Movements: Extraocular movements intact.      Conjunctiva/sclera: Conjunctivae normal.      Pupils: Pupils are equal, round, and reactive to light.   Neck:      Vascular: No carotid bruit.   Cardiovascular:      Rate and Rhythm: Normal rate and regular rhythm.      Pulses: Normal pulses.      Heart sounds: Normal heart sounds. No murmur heard.     No friction rub. No gallop.   Pulmonary:      Effort: Pulmonary effort is normal. No respiratory distress.      Breath sounds: No stridor. No wheezing, rhonchi or rales.      Comments: Diminished breath sounds noted  bilaterally throughout with scant crackles noted in bases.  Chest:      Chest wall: No tenderness.   Abdominal:      General: Abdomen is flat. Bowel sounds are normal. There is no distension.      Palpations: Abdomen is soft. There is no mass.      Tenderness: There is no abdominal tenderness. There is no right CVA tenderness, left CVA tenderness, guarding or rebound.      Hernia: No hernia is present.   Musculoskeletal:         General: No swelling, tenderness, deformity or signs of injury. Normal range of motion.      Cervical back: Normal range of motion and neck supple. No rigidity or tenderness.      Right lower leg: No edema.      Left lower leg: No edema.   Lymphadenopathy:      Cervical: No cervical adenopathy.   Skin:     General: Skin is warm and dry.      Capillary Refill: Capillary refill takes less than 2 seconds.      Coloration: Skin is jaundiced. Skin is not pale.      Findings: No bruising, erythema, lesion or rash.   Neurological:      General: No focal deficit present.      Mental Status: He is alert and oriented to person, place, and time. Mental status is at baseline.      Cranial Nerves: No cranial nerve deficit.      Sensory: No sensory deficit.      Motor: No weakness.      Coordination: Coordination normal.   Psychiatric:         Mood and Affect: Mood normal.         Behavior: Behavior normal.         Thought Content: Thought content normal.         Judgment: Judgment normal.              CRANIAL NERVES     CN III, IV, VI   Pupils are equal, round, and reactive to light.       Significant Labs: All pertinent labs within the past 24 hours have been reviewed.    Significant Imaging: I have reviewed all pertinent imaging results/findings within the past 24 hours.    LABS:  Recent Results (from the past 24 hours)   PROTIME-INR    Collection Time: 11/17/24 11:39 AM   Result Value Ref Range    PT 22.5 (H) 11.5 - 15.3 SECONDS    INR 2.0    CK    Collection Time: 11/17/24 11:39 AM   Result Value Ref  Range     (H) 30 - 200 U/L   B-TYPE NATRIURETIC PEPTIDE    Collection Time: 11/17/24 11:39 AM   Result Value Ref Range     (H) 15 - 73 PG/ML   TROPONIN    Collection Time: 11/17/24 11:39 AM   Result Value Ref Range    Troponin I 0.03 <0.01 - 0.03 ng/mL       RADIOLOGY  X-Ray Chest 1 View    Result Date: 11/17/2024  XR CHEST 1 VIEW HISTORY:  shortness of breath COMPARISON: February 13, 2024. Single frontal view of the chest.    1.  Mild cardiomegaly vascular congestion appear slightly improved. Central peribronchial thickening with bilateral mid and lower lung groundglass, alveolar and confluent airspace opacities are now present, greater on the right. Small bilateral pleural effusions are now present. 2.  The above findings may represent edema/CHF with partial correction, although infection cannot be excluded. Correlation clinical findings and follow-up is recommended. 3.  The osseous and soft tissues show no acute finding.    US Abdomen Complete    Result Date: 11/1/2024  Abdominal ultrasound INDICATION:            K74.3:Primary biliary cirrhosis (HCC) Comparison: Abdominal MRI 3/12/2024 There is cirrhotic liver morphology. The liver measures 20 cm in the midclavicular line.  There is no evidence of liver mass. There is hepatopedal flow in the main portal vein. There is recanalization of the umbilical vein. The visualized portions of the pancreas, aorta, and IVC are unremarkable.   The common bile duct is not visualized. No gallstones, gallbladder wall thickening, or pericholecystic fluid. The kidneys are normal in size measuring 11.1 cm in length on the right and 11.3 cm in length on the left. Cortical echotexture is normal. No solid mass, shadowing calculus, or hydronephrosis is seen. The spleen is mildly enlarged at 15 cm in length. No evidence of splenic mass.    Cirrhosis. No definite liver mass is seen. Visualization of the liver is limited due to the severe diffuse hyperechogenicity of the  liver, which is unlikely due to hepatic steatosis since no significant fat was seen on the recent prior MRI. No evidence of ascites. Splenomegaly and recanalization of the umbilical vein are compatible with portal hypertension. Limited study due to nonvisualization of the common bile duct.      EKG    MICROBIOLOGY    MDM    Assessment/Plan:     * Shortness of breath  Patient presented with worsening dyspnea/shortness a breath along with bilateral lower extremity edema in setting of noncompliance with home medications which include metoprolol and furosemide.  Patient also currently consumption alcohol.  Workup at outside facility revealed patient to have evidence of bilateral pleural effusions with pulmonary edema noted on chest x-ray.  Currently saturating 99% on room air in no acute distress.  H/H 10.1/28.2.  BNP elevated at 771.  Echo noted on 09/11/2023 revealed EF 55-65%.  Symptoms likely multifactorial in nature given underlying liver cirrhosis/PBC and possible new onset heart failure. Patient received Lasix 80 mg IV x1 prior to transfer with improvement in symptoms. Cardiology consulted and awaiting further evaluation/recommendations.  Plan:  -NPO  -telemetry  -monitor Is/Os  -daily weights   -f/u echo  -IV diuretics p.r.n.   -titrate oxygen therapy as needed  -incentive spirometry  -duo nebs p.r.n.  -f/u cardiology       Generalized abdominal pain  Patient reports chronic generalized abdominal pain unchanged from baseline in his currently followed by GI/hepatology outpatient for liver cirrhosis and PBC as noted below. Patient currently admitted for CHF and liver failure. GI/Hepatology consulted and awaiting further evaluation/recommendations. Outpatient workup as noted below.    Abdominal US 11/1/24  -Cirrhosis. No definite liver mass is seen. Visualization of the liver is limited due to the severe diffuse hyperechogenicity of the liver, which is unlikely due to hepatic steatosis since no significant fat was  seen on the recent prior MRI.   -No evidence of ascites. Splenomegaly and recanalization of the umbilical vein are compatible with portal hypertension.   -Limited study due to nonvisualization of the common bile duct.     EGD 4/11/24  -Esophagus: Grade 1 varices. Irregular z-line@38cm  -Stomach: 2x ulcers (punctate antral, 1cm white based flat prepyloric)     Pathology EGD 4/11/24     1. Stomach, biopsy:                                                        - Fragments of gastric oxyntic-type mucosa with focal erosion and reactive epithelial changes.                                        - Negative for intestinal metaplasia, dysplasia or malignancy.          - Immunostain for H. pylori is negative for Helicobacter pylori organisms.                                                             2. Esophagus, GE junction, biopsy:                                         - Fragments of gastric cardia-type mucosa with chronic inflammation, foveolar hyperplasia and reactive epithelial changes.                                                           - Negative for intestinal metaplasia, dysplasia or malignancy.       MRI Abdomen W/WO 3/12/24   -Cirrhosis and portal hypertension with recanalized umbilical vein, varices, and splenomegaly.     Liver Biopsy 3/8/24  - Steatohepatitis with moderate steatosis, mild activity and well-developed cirrhosis.                                               - Rare noncaseating granulomas. See comment.     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 Co-morbidities are present and inclusive of portal hypertension and esophageal varices.  Hepatitis panel negative on 02/13/2024.  ELIEL, AFP negative on 02/16/2024.  Mitochondrial M2 antibody elevated at  29.0 on 02/16/2024.  MELD-Na score calculated; MELD 3.0: 31 at 11/17/2024  9:43 PM  MELD-Na: 32 at 11/17/2024  9:43 PM  Calculated from:  Serum Creatinine: 1.2 mg/dL at 11/17/2024  9:43 PM  Serum Sodium: 122 mmol/L (Using min of 125 mmol/L) at 11/17/2024  9:43 PM  Total Bilirubin: 21.5 mg/dL at 11/17/2024  9:43 PM  Serum Albumin: 2.1 g/dL at 11/17/2024  9:43 PM  INR(ratio): 1.9 at 11/17/2024  9:43 PM  Age at listing (hypothetical): 39 years  Sex: Male at 11/17/2024  9:43 PM    Continue chronic meds. 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 Will avoid any hepatotoxic meds, and monitor CBC/CMP/INR for synthetic function. Hepatology consulted and awaiting further evaluation/recommendations.      Hypertension  Chronic, controlled.  Latest blood pressure and vitals reviewed-   Temp:  [97.8 °F (36.6 °C)-98.1 °F (36.7 °C)]   Pulse:  [93-96]   Resp:  [18-20]   BP: (111-133)/(53-63)   SpO2:  [95 %-99 %] .   Home meds for hypertension were reviewed and noted below.     While in the hospital, will manage blood pressure as follows; Adjust home antihypertensive regimen as follows- resume home metoprolol once evaluated by Cardiology.  Currently on IV Lasix p.r.n. for chf workup.    Will utilize p.r.n. blood pressure medication only if patient's blood pressure greater than  180/110 and he develops symptoms such as worsening chest pain or shortness of breath.      GERD (gastroesophageal reflux disease)  Chronic. Stable. Currently asymptomatic. Home medications include PPI/Antacids as needed.  Plan:  -Continue PPI/Antacids       Class 3 severe obesity due to excess calories with body mass index (BMI) of 50.0 to 59.9 in adult  Body mass index is 54.53 kg/m². Morbid obesity complicates all aspects of disease management from diagnostic modalities to treatment. Weight loss encouraged and health benefits explained to patient.         VTE Risk Mitigation (From admission, onward)           Ordered     Reason for No Pharmacological VTE Prophylaxis  Once         Question:  Reasons:  Answer:  Physician Provided (leave comment)  Comment:  pending potential procedure    11/17/24 2056     IP VTE LOW RISK PATIENT  Once         11/17/24 2056     Place sequential compression device  Until discontinued         11/17/24 2056                  //Core Measures   -DVT proph: SCDs, withholding anticoagulation pending surgical intervention   -Code status: Full    -Surrogate: none provided       Components of this note were documented using a voice recognition system and are subject to errors not corrected at the time the document was proof read. Please contact the author for any clarifications.        Montrell Allan MD  Department of Hospital Medicine  [facility] Med S[facility]

## 2024-11-19 NOTE — SUBJECTIVE & OBJECTIVE
Interval History:  Continuing IV lasix, potassium repleted 40 mEq PO TID, strict fluid restriction maintained.    Review of Systems   Constitutional:  Positive for activity change.   HENT:  Mouth sores: cracked bleedng lips.    Respiratory:  Shortness of breath: dyspnea.    Cardiovascular:  Positive for leg swelling (+4 edema).   Gastrointestinal:  Positive for abdominal distention.   Genitourinary: Negative.    Skin:  Positive for color change (jaundice).   Psychiatric/Behavioral:  Positive for confusion.      Objective:     Vital Signs (Most Recent):  Temp: 98.1 °F (36.7 °C) (11/19/24 0931)  Pulse: (!) 119 (11/19/24 0931)  Resp: 18 (11/19/24 0931)  BP: (!) 99/51 (11/19/24 0931)  SpO2: (!) 94 % (11/19/24 0931) Vital Signs (24h Range):  Temp:  [97.5 °F (36.4 °C)-98.7 °F (37.1 °C)] 98.1 °F (36.7 °C)  Pulse:  [101-126] 119  Resp:  [18-22] 18  SpO2:  [90 %-98 %] 94 %  BP: ()/(39-60) 99/51     Weight: (!) 166.7 kg (367 lb 8.1 oz)  Body mass index is 52.73 kg/m².    Intake/Output Summary (Last 24 hours) at 11/19/2024 1008  Last data filed at 11/19/2024 0359  Gross per 24 hour   Intake 240 ml   Output 1425 ml   Net -1185 ml         Physical Exam  Vitals and nursing note reviewed. Exam conducted with a chaperone present.   Constitutional:       Appearance: He is obese. He is ill-appearing.   HENT:      Mouth/Throat:      Mouth: Mucous membranes are dry.      Dentition: Abnormal dentition.   Eyes:      General: Scleral icterus present.      Pupils: Pupils are equal, round, and reactive to light.   Cardiovascular:      Rate and Rhythm: Regular rhythm. Tachycardia present.      Pulses: Normal pulses.           Radial pulses are 2+ on the right side and 2+ on the left side.        Dorsalis pedis pulses are 2+ on the right side and 2+ on the left side.      Heart sounds: Normal heart sounds, S1 normal and S2 normal.   Pulmonary:      Effort: Pulmonary effort is normal. Tachypnea present.      Breath sounds: Normal breath  sounds and air entry.   Abdominal:      General: Abdomen is protuberant. Bowel sounds are increased. There is distension.      Palpations: Abdomen is soft.   Musculoskeletal:         General: Swelling: BLE edema.      Right lower le+ Edema present.      Left lower le+ Edema present.   Skin:     General: Skin is warm and dry.      Capillary Refill: Capillary refill takes 2 to 3 seconds.      Coloration: Skin is jaundiced.   Neurological:      Mental Status: He is alert. He is disoriented.      GCS: GCS eye subscore is 4. GCS verbal subscore is 5. GCS motor subscore is 6.      Motor: Weakness present. No tremor or seizure activity.   Psychiatric:         Speech: Speech is delayed.         Behavior: Behavior is slowed. Behavior is cooperative.         Cognition and Memory: Cognition is impaired. Memory is impaired.             Significant Labs: All pertinent labs within the past 24 hours have been reviewed.  Bilirubin:   Recent Labs   Lab 2416 24  0454   BILIDIR >14.0*  --   --    BILITOT 21.5* 21.3* 23.8*     BMP:   Recent Labs   Lab 2416 24  0454    100   * 123*   K 3.2* 3.0*   CL 83* 83*   CO2 25 26   BUN 13 19   CREATININE 1.2 1.5*   CALCIUM 8.5* 8.5*   MG 1.6  --      CBC:   Recent Labs   Lab 24  0616 24  0454   WBC 8.64 8.56 7.29   HGB 10.1* 9.6* 9.1*   HCT 28.2* 26.5* 25.8*   * 126* 116*     Lactic Acid:   Recent Labs   Lab 24  0454 24  0808   LACTATE 2.0 1.3 1.4     POCT Glucose:   Recent Labs   Lab 24  0133   POCTGLUCOSE 104     Ammonia-57, AST-158, ALT-62    Significant Imaging: I have reviewed all pertinent imaging results/findings within the past 24 hours.

## 2024-11-19 NOTE — CONSULTS
[facility] Surg  Hepatology  Consult Note    Patient Name: Marcellus Mendoza  MRN: [id number]  Admission Date: 2024  Hospital Length of Stay: 2 days  Attending Provider: Irma Napier MD   Primary Care Physician: No, Primary Doctor  Principal Problem:Shortness of breath    Inpatient Consult to Telemedicine - Hepatology  Consult performed by: Harmeet Macias MD  Consult ordered by: Montrell Allan MD        Subjective:     Transplant status: No    HPI: Marcellus Mendoza is a 39 y.o. male with a PMH PBC cirrhosis, alcohol abuse, Obesity, admitted with decompensated cirrhosis and Jaundice. Patient has been diagnosed as PBC cirrhosis on liver biopsy during evaluation of elevated liver enzymes and cirrhosis in  at [facility]. He did see GI at [place] but did not follow. He has not taking urosdiol and furosemide for at least a couple months and is actively drinking. . He has been now admitted with fluid overload over last 1 month. He has history of alcohol abuse and last drink was 1 week back. MELD 30 on admission. Denies hematemesis or confusion. He has few episodes of black stools in past. He has been on IV lasix since admission    Review of Systems  12 point review of system negative except HPI  Past Medical History:   Diagnosis Date    Alcohol abuse     Anasarca     GERD (gastroesophageal reflux disease)     Hypertension     Obesity, unspecified     Unspecified cirrhosis of liver     Vitamin D deficiency        Past Surgical History:   Procedure Laterality Date    ORIF, FRACTURE, ANKLE, BIMALLEOLAR Right        Family history of liver disease: No    Review of patient's allergies indicates:  No Known Allergies      Tobacco Use    Smoking status: Former     Current packs/day: 0.00     Types: Cigarettes     Quit date: 2023     Years since quittin.3    Smokeless tobacco: Never   Substance and Sexual Activity    Alcohol use: Not Currently     Alcohol/week: 5.0 standard drinks of alcohol      Types: 5 Drinks containing 0.5 oz of alcohol per week     Comment: reports last drink Friday, 11/8    Drug use: Not Currently    Sexual activity: Yes       No medications prior to admission.       Objective:     Vital Signs (Most Recent):  Temp: 98.2 °F (36.8 °C) (11/19/24 0642)  Pulse: (!) 114 (11/19/24 0642)  Resp: 20 (11/19/24 0642)  BP: (!) 119/57 (11/19/24 0642)  SpO2: (!) 93 % (11/19/24 0642) Vital Signs (24h Range):  Temp:  [97.5 °F (36.4 °C)-98.7 °F (37.1 °C)] 98.2 °F (36.8 °C)  Pulse:  [101-126] 114  Resp:  [18-22] 20  SpO2:  [90 %-98 %] 93 %  BP: (105-136)/(43-60) 119/57     Weight: (!) 166.7 kg (367 lb 8.1 oz) (11/19/24 0210)  Body mass index is 52.73 kg/m².    Physical Exam  Constitutional:       Appearance: Normal appearance.   HENT:      Head: Normocephalic and atraumatic.      Right Ear: Tympanic membrane and external ear normal.      Left Ear: Tympanic membrane and external ear normal.      Mouth/Throat:      Mouth: Mucous membranes are moist.   Eyes:      General: Scleral icterus present.      Extraocular Movements: Extraocular movements intact.      Pupils: Pupils are equal, round, and reactive to light.   Cardiovascular:      Rate and Rhythm: Normal rate and regular rhythm.      Pulses: Normal pulses.      Heart sounds: Normal heart sounds.   Pulmonary:      Effort: Pulmonary effort is normal. No respiratory distress.   Abdominal:      General: Bowel sounds are normal. There is no distension.      Palpations: Abdomen is soft. There is no mass.   Musculoskeletal:         General: Swelling present. No deformity. Normal range of motion.      Cervical back: Normal range of motion and neck supple.   Skin:     Coloration: Skin is not jaundiced.   Neurological:      General: No focal deficit present.      Mental Status: He is alert and oriented to person, place, and time.      Cranial Nerves: No cranial nerve deficit.   Psychiatric:         Mood and Affect: Mood normal.         Behavior: Behavior normal.          MELD 3.0: 33 at 11/19/2024  4:54 AM  MELD-Na: 33 at 11/19/2024  4:54 AM  Calculated from:  Serum Creatinine: 1.5 mg/dL at 11/19/2024  4:54 AM  Serum Sodium: 123 mmol/L (Using min of 125 mmol/L) at 11/19/2024  4:54 AM  Total Bilirubin: 23.8 mg/dL at 11/19/2024  4:54 AM  Serum Albumin: 2 g/dL at 11/19/2024  4:54 AM  INR(ratio): 1.9 at 11/17/2024  9:43 PM  Age at listing (hypothetical): 39 years  Sex: Male at 11/19/2024  4:54 AM      Significant Labs:  CBC:   Recent Labs   Lab 11/19/24 0454   WBC 7.29   RBC 2.93*   HGB 9.1*   HCT 25.8*   *     CMP:   Recent Labs   Lab 11/19/24 0454      CALCIUM 8.5*   ALBUMIN 2.0*   PROT 6.7   *   K 3.0*   CO2 26   CL 83*   BUN 19   CREATININE 1.5*   ALKPHOS 215*   ALT 62*   *   BILITOT 23.8*     Coagulation:   Recent Labs   Lab 11/17/24 2143   INR 1.9*   APTT 39.3*       Significant Imaging:  Labs: Reviewed      Assessment/Plan:     Active Diagnoses:    Diagnosis Date Noted POA    PRINCIPAL PROBLEM:  Shortness of breath [R06.02] 11/17/2024 Yes    Hypertension [I10] 11/18/2024 Yes     Chronic    GERD (gastroesophageal reflux disease) [K21.9] 11/18/2024 Yes     Chronic    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 Decompensated cirrhosis with alcohol related hepatitis  Nutrition  Supportive care  Not a candidate for steroids   Culture screen, Diagnostic para to rule out infection  Sobriety  CT liver and tumor markers AFP      PBC  Restart on Ursodiol    --Cirrhosis  Liver disease:                    primary biliary cholangitis  MELD-Na:                         30  Child-Zarate Class:             C    Cirrhosis is decompensated with:    Ascites:                                                      yes  Spontaneous bacterial peritonitis:              no  Hepatic Encephalopathy:                           no  Portal bleeding:                                          no  Hepatocellular carcinoma:                          no    Hepatorenal syndrome:                              no  Hyponatremia:                                            yes  Muscle wasting:                                          yes   Portal vein thrombosis:                               no      Transplant status:             not under evaluation  Patient has been active alcohol abuse with last drink 1 week       --Ascites/Edema: IV diuresis. Plan US liver and paracentesis  Echo card      - Encephalopathy: Continue lactulose. Titrate to maintain 3-4 bowel movements per day..        - Varices: EGD surveillance as per GI recommendation. Beta blockers no    -Cirrhosis Care    - HCC screening:  Imaging and AFP for HCC surveillance every 6 months.  - Immunizations: Recommend HAV and HBV vaccinations if not immune.  - Dexa Scan every 2-3 years    Rest Health Maintenance as per Primary care Physician     The patient location is: ([place])  The chief complaint leading to consultation is: Cirrhosis     Visit type: audiovisual     Face to Face time with patient /30    70 minutes of total time spent on the encounter, which includes face to face time and non-face to face time preparing to see the patient (eg, review of tests), Obtaining and/or reviewing separately obtained history, Documenting clinical information in the electronic or other health record, Independently interpreting results (not separately reported) and communicating results to the patient/family/caregiver, or Care coordination (not separately reported).     Each patient to whom he or she provides  medical services by telemedicine is:  (1) informed of the relationship between the physician and patient and the respective role of any other health care provider with respect to management of the patient; and (2) notified that he or she may decline to receive medical services by telemedicine and may withdraw from such care at any time.    Thank you for your consult. I will follow-up with patient. Please contact us if you have any additional questions.    Harmeet Macias MD  Hepatology  [facility]

## 2024-11-19 NOTE — PROGRESS NOTES
[facility]  [facility]atology  Progress Note    Patient Name: Marcellus Mendoza  MRN: [id number]  Admission Date: 11/17/2024  Hospital Length of Stay: 2 days  Attending Provider: Irma Napier MD   Primary Care Physician: [place], Primary Doctor  Principal Problem:Shortness of breath    Subjective:     Transplant status: No    HPI: Marcellus Mendoza is a 39 y.o. male with a PMH PBC cirrhosis ( Biopsy Proven), admitted with ascites, HE and Jaundice. Patient has been diagnosed as PBC cirrhosis on liver biopsy during evaluation of elevated liver enzymes in 2023 at [facility] by GI and started on Ursodiol. He did see GI at [place] but did not follow after firstv2 visits. He has not taking urosdiol and furosemide for at least a couple months. He also mentions drinking alcohol but amount could not assesses due to HE . He has been now admitted with fluid overload  and jaundice worsening over last few weeks. His MELD 30 on admission. Denies hematemesis or melena. He has been on IV lasix since admission       Interval History: Significant HE and worsening Jaundice    Current Facility-Administered Medications   Medication    acetaminophen suppository 650 mg    acetaminophen tablet 650 mg    albuterol-ipratropium 2.5 mg-0.5 mg/3 mL nebulizer solution 3 mL    aluminum-magnesium hydroxide-simethicone 200-200-20 mg/5 mL suspension 30 mL    cefTRIAXone injection 1 g    cholecalciferol (vitamin D3) 125 mcg (5,000 unit) tablet 5,000 Units    dextrose 10% bolus 125 mL 125 mL    dextrose 10% bolus 250 mL 250 mL    folic acid-vit B6-vit B12 2.5-25-2 mg tablet 1 tablet    furosemide injection 60 mg    glucagon (human recombinant) injection 1 mg    glucose chewable tablet 16 g    glucose chewable tablet 24 g    lactulose 20 gram/30 mL solution Soln 20 g    melatonin tablet 6 mg    multivitamin tablet    mupirocin 2 % ointment    naloxone 0.4 mg/mL injection 0.02 mg    ondansetron injection 4 mg    pantoprazole injection 40 mg     potassium chloride SA CR tablet 40 mEq    promethazine tablet 25 mg    senna-docusate 8.6-50 mg per tablet 1 tablet    sodium chloride 0.9% flush 10 mL    spironolactone tablet 100 mg    thiamine tablet 100 mg    ursodioL capsule 600 mg       Objective:     Vital Signs (Most Recent):  Temp: 98.1 °F (36.7 °C) (11/19/24 1227)  Pulse: (!) 116 (11/19/24 1227)  Resp: 18 (11/19/24 1227)  BP: (!) 92/49 (11/19/24 1227)  SpO2: (!) 93 % (11/19/24 1227) Vital Signs (24h Range):  Temp:  [97.8 °F (36.6 °C)-98.7 °F (37.1 °C)] 98.1 °F (36.7 °C)  Pulse:  [102-126] 116  Resp:  [18-22] 18  SpO2:  [90 %-98 %] 93 %  BP: ()/(39-60) 92/49     Weight: (!) 166.7 kg (367 lb 8.1 oz) (11/19/24 0210)  Body mass index is 52.73 kg/m².    Physical Exam  Constitutional:       Appearance: Normal appearance.   HENT:      Head: Normocephalic and atraumatic.      Right Ear: Tympanic membrane and external ear normal.      Left Ear: Tympanic membrane and external ear normal.      Mouth/Throat:      Mouth: Mucous membranes are moist.   Eyes:      General: Scleral icterus present.      Extraocular Movements: Extraocular movements intact.      Pupils: Pupils are equal, round, and reactive to light.   Cardiovascular:      Rate and Rhythm: Normal rate and regular rhythm.      Pulses: Normal pulses.      Heart sounds: Normal heart sounds.   Pulmonary:      Effort: Pulmonary effort is normal. No respiratory distress.   Abdominal:      General: Bowel sounds are normal. There is no distension.      Palpations: Abdomen is soft. There is no mass.   Musculoskeletal:         General: Swelling present. No deformity. Normal range of motion.      Cervical back: Normal range of motion and neck supple.   Skin:     Coloration: Skin is not jaundiced.   Neurological:      General: No focal deficit present.      Mental Status: He is alert and oriented to person, place, and time.      Cranial Nerves: No cranial nerve deficit.   Psychiatric:         Mood and Affect: Mood  normal.         Behavior: Behavior normal.          Significant Labs:  CBC:   Recent Labs   Lab 11/19/24 0454   WBC 7.29   RBC 2.93*   HGB 9.1*   HCT 25.8*   *     CMP:   Recent Labs   Lab 11/19/24 0454      CALCIUM 8.5*   ALBUMIN 2.0*   PROT 6.7   *   K 3.0*   CO2 26   CL 83*   BUN 19   CREATININE 1.5*   ALKPHOS 215*   ALT 62*   *   BILITOT 23.8*     Coagulation:   Recent Labs   Lab 11/17/24 2143   INR 1.9*   APTT 39.3*       Significant Imaging:  US: Reviewed      Assessment/Plan:     Active Diagnoses:    Diagnosis Date Noted POA    PRINCIPAL PROBLEM:  Shortness of breath [R06.02] 11/17/2024 Yes    Hypertension [I10] 11/18/2024 Yes     Chronic    GERD (gastroesophageal reflux disease) [K21.9] 11/18/2024 Yes     Chronic    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 Decompensated PBC cirrhosis ? alcohol related hepatitis  Plan  Supportive care  Culture screen, Diagnostic para to rule out infection  CT liver and tumor markers AFP  Transfer the patient to [facility] for higher care        PBC  Restart on Ursodiol     --Cirrhosis  Liver disease:                   primary biliary cholangitis  MELD-Na:                         33  Child-Zarate Class:             C  Cirrhosis is decompensated with ascites, HE and jaundice  Discussed prognosis with family\  Refer to [facility] for higher care and discussed transplant evaluation      Transplant status:  Concern over substance abuse. Good family support. Discussed transfer patient to [facility] transplant center.  Hepatologist informed. Discussed with family         --Ascites/Edema: IV diuresis. Plan US liver and paracentesis  Echo card        - Encephalopathy: Continue lactulose. Titrate to maintain 3-4 bowel movements per day..    The patient location is: ([place])  The chief complaint leading to consultation is: Cirrhosis     Visit type: audiovisual     Face to Face time with patient: 25     40 minutes of total time spent on the encounter, which includes face to face time and non-face to face time preparing to see the patient (eg, review of tests), Obtaining and/or reviewing separately obtained history, Documenting clinical information in the electronic or other health record, Independently interpreting results (not separately reported) and communicating results to the patient/family/caregiver, or Care coordination (not separately reported).     Each patient to whom he or she provides medical services by telemedicine is:  (1) informed of the relationship between the physician and patient and the respective role of any other health care provider with respect to management of the patient; and (2) notified that he or she may decline to receive medical services by telemedicine and may withdraw from such care at any time.         Thank you for your consult. I will follow-up with patient. Please contact us if you have any additional questions.    Harmeet Macias MD  Hepatology  [facility]

## 2024-11-19 NOTE — PLAN OF CARE
Discussed poc with pt, pt verbalized understanding    Purposeful rounding every 2hours    VS monitored as ordered  Cardiac monitoring in use, pt is , tele monitor # [id number]  Fall precautions in place, remains injury free  Pain and nausea under control with PRN meds    IV saline locked  Accurate I&Os  Abx given as prescribed  Bed locked at lowest position, bed alarm set  Call light within reach    Chart check complete  Will cont with POC

## 2024-11-19 NOTE — ASSESSMENT & PLAN NOTE
Detail Level: Generalized Patient reports chronic generalized abdominal pain unchanged from baseline in his currently followed by GI/hepatology outpatient for liver cirrhosis and PBC as noted below. Patient currently admitted for CHF and liver failure. GI/Hepatology consulted and awaiting further evaluation/recommendations. Outpatient workup as noted below.    Abdominal US 11/1/24  -Cirrhosis. No definite liver mass is seen. Visualization of the liver is limited due to the severe diffuse hyperechogenicity of the liver, which is unlikely due to hepatic steatosis since no significant fat was seen on the recent prior MRI.   -No evidence of ascites. Splenomegaly and recanalization of the umbilical vein are compatible with portal hypertension.   -Limited study due to nonvisualization of the common bile duct.     EGD 4/11/24  -Esophagus: Grade 1 varices. Irregular z-line@38cm  -Stomach: 2x ulcers (punctate antral, 1cm white based flat prepyloric)     Pathology EGD 4/11/24     1. Stomach, biopsy:                                                        - Fragments of gastric oxyntic-type mucosa with focal erosion and reactive epithelial changes.                                        - Negative for intestinal metaplasia, dysplasia or malignancy.          - Immunostain for H. pylori is negative for Helicobacter pylori organisms.                                                             2. Esophagus, GE junction, biopsy:                                         - Fragments of gastric cardia-type mucosa with chronic inflammation, foveolar hyperplasia and reactive epithelial changes.                                                           - Negative for intestinal metaplasia, dysplasia or malignancy.       MRI Abdomen W/WO 3/12/24   -Cirrhosis and portal hypertension with recanalized umbilical vein, varices, and splenomegaly.     Liver Biopsy 3/8/24  - Steatohepatitis with moderate steatosis, mild activity and well-developed cirrhosis.                                                - Rare noncaseating granulomas. See comment.     Plan:  -NPO  -Continue current pain regimen, titrate as needed  -Bowel regimen  -Bedrest  -Antiemetics prn  -Tylenol as needed for fever   -Continue antibiotics   -f/u Hepatology    Sec to Ascites, check US abd-   May need paracentesis   Detail Level: Detailed Detail Level: Zone

## 2024-11-19 NOTE — ASSESSMENT & PLAN NOTE
Patient with known Cirrhosis with Child's class C. Co-morbidities are present and inclusive of portal hypertension and esophageal varices.  Hepatitis panel negative on 02/13/2024.  ELIEL, AFP negative on 02/16/2024.  Mitochondrial M2 antibody elevated at 29.0 on 02/16/2024.  MELD-Na score calculated; MELD 3.0: 33 at 11/19/2024  4:54 AM  MELD-Na: 33 at 11/19/2024  4:54 AM  Calculated from:  Serum Creatinine: 1.5 mg/dL at 11/19/2024  4:54 AM  Serum Sodium: 123 mmol/L (Using min of 125 mmol/L) at 11/19/2024  4:54 AM  Total Bilirubin: 23.8 mg/dL at 11/19/2024  4:54 AM  Serum Albumin: 2 g/dL at 11/19/2024  4:54 AM  INR(ratio): 1.9 at 11/17/2024  9:43 PM  Age at listing (hypothetical): 39 years  Sex: Male at 11/19/2024  4:54 AM    Continue chronic meds. 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 Will avoid any hepatotoxic meds, and monitor CBC/CMP/INR for synthetic function. Hepatology consulted and awaiting further evaluation/recommendations.    Cont diuresis, add lactulose  Triple vitamins

## 2024-11-19 NOTE — PLAN OF CARE
Discussed poc with pt family, pt family verbalized understanding    Purposeful rounding every 2hours    Pt BP has been low throughout the day. Providers are aware.  Cardiac monitoring in use, pt is ST, tele monitor # [id number]  Fall precautions in place, remains injury free  Debora sys in place.  Pt confused and only oriented to himself.    ABX given as ordered.  Accurate I&Os    Bed locked at lowest position  Call light within reach  Family present at bedside.  Chart check complete  Will cont with POC

## 2024-11-19 NOTE — NURSING
Pt step mother visiting. Asked information regarding pt. Notified her that we need to speak to pt emergency contact to confirm that we can give pt information. Pt mother stated that it was ok.

## 2024-11-19 NOTE — PROVIDER TRANSFER
Outside Transfer Acceptance Note / Regional Referral Center    Referring facility: [facility]   Referring provider: TONY TAMEZ  Accepting facility: [facility]  Accepting provider: JENNIFER MARINA  Admitting provider: [facility] MICU  Reason for transfer: Higher Level of Care   Transfer diagnosis: alcoholic cirrhosis  Transfer specialty requested: Hepatology  Transfer specialty notified: Yes  Transfer level: NUMBER 1-5: 2  Bed type requested:ICU  Isolation status: No active isolations   Admission class or status:IP- Inpatient      Narrative     39 with depression, FAWAD, HTN, alcoholic misuse, primary biliary cholangitis, alcoholic cirrhosis with intermittent follow up with GI came in for SOB on 11/17 to a local ER. He was found to have bilateral pleural effusion and pulmonary edema. He was first diagnosed with primary biliary cholangitis (AMA +) and alcoholic cirrhosis in 2/2024. He was told to quit drinking at the time and there are reports of him attempting to abstain and going to an IOP, but upon presentation, he admits to be actively drinking. Last drink 11/8. He has not been taking urosdiol and furosemide for at least a couple months. He was transferred to hospital medicine at [facility] and cardiology and hepatology saw patient. Patient was started on supportive care including furosemide IV, spironolactone and albumin. He is on ceftriaxone for SBP prevention. US abdomen showed no significant ascites. MELD 30-->33 despite supportive care. Hepatologist there spoke with Dr. Bueno, who agreed to evaluate patient for an in-patient liver transplant work up. While waiting transfer to stepdown floor, patient became combative and disoriented. Lactulose was increased. Then patient desaturated early this morning. He is currently on vapotherm 50%. CXR showe bilateral pulmonary effusion and there is suspicion of aspiration. His IV antibiotic  has been excalated to zosyn.  His Scr increased 1.5-->1.9 and was started on midodrine for heptorenal. He remains on furosemide 40 mg IV BID with good UOP. ICU, Dr. Lea, agreed to take him on the MICU service.     Objective     Recent Labs   Lab 11/18/24 0616 11/19/24 0454 11/20/24 0226   * 123* 124*   K 3.2* 3.0* 2.9*   CL 83* 83* 83*   CO2 25 26 25   BUN 13 19 23*   CREATININE 1.2 1.5* 1.9*    100 103   CALCIUM 8.5* 8.5* 8.6*   MG 1.6  --  1.8   PHOS  --   --  2.3*     Recent Labs   Lab 11/17/24  1139 11/17/24 2143 11/17/24 2143 11/18/24 0616 11/19/24 0454 11/20/24 0226 11/20/24 0234   ALKPHOS  --  227*   < > 212* 215* 216*  --    ALT  --  55*   < > 59* 62* 64*  --    AST  --  158*   < > 155* 158* 154*  --    ALBUMIN  --  2.1*   < > 2.0* 2.0* 2.2*  --    PROT  --  7.0   < > 6.8 6.7 6.7  --    BILITOT  --  21.5*   < > 21.3* 23.8* 24.4*  --    INR 2.0 1.9*  --   --   --   --  1.8*    < > = values in this interval not displayed.       Recent Labs   Lab 11/18/24 0616 11/19/24 0454 11/20/24 0226   WBC 8.56 7.29 9.38   HGB 9.6* 9.1* 9.3*   HCT 26.5* 25.8* 26.9*   * 116* 110*   GRAN 76.7*  6.6 70.7  5.2 65.7  6.2   LYMPH 5.6*  0.5* 6.9*  0.5* 8.0*  0.8*   MONO 14.3  1.2* 18.0*  1.3* 19.1*  1.8*   EOSINOPHIL 1.5 2.3 3.2       Recent Labs   Lab 11/19/24  0133   POCTGLUCOSE 104       Airway:   room air   Allergies: Review of patient's allergies indicates:  No Known Allergies   NPO: No    Anticoagulation:   Anticoagulants       None           Instructions      Donnell Guzman-  Admit to critical care

## 2024-11-19 NOTE — ASSESSMENT & PLAN NOTE
Weight loss encouraged and health benefits explained to patient. Treating fluid overload with lasix IV>

## 2024-11-19 NOTE — PROGRESS NOTES
[facility] Medicine  Progress Note    Patient Name: Marcellus Mendoza  MRN: [id number]  Patient Class: IP- Inpatient   Admission Date: 11/17/2024  Length of Stay: 2 days  Attending Physician: Irma Napier MD  Primary Care Provider: Katlin, Primary Doctor        Subjective:     Principal Problem:Shortness of breath        HPI:  Marcellus Mendoza is a 39 y.o. male with a PMH  has a past medical history of Alcohol abuse, Anasarca, GERD (gastroesophageal reflux disease), Hypertension, Obesity, unspecified, Unspecified cirrhosis of liver, and Vitamin D deficiency. who presented as a transfer from outside facility for higher level of care after patient was found to have evidence of acute CHF exacerbation as well as further evaluation by hepatology regarding decompensated liver cirrhosis and PBC.  Patient reports unchanged chronic abdominal pain from baseline but reported his dyspnea/shortness of breath and lower extremity swelling had progressively worsened over the past 2 weeks prompting him to go to ED for further evaluation.  He reported no known alleviating factors with aggravating factors including laying flat and with minimal exertion.  Patient reported he has not taken his urosdiol or furosemide over the past few months secondary to not having any refills and thought they were a 1 time prescription.  At time of bedside assessment, patient reported improvement in symptoms following administration of IV Lasix at outside facility and is asking how long he will be admitted here for.  Patient informed that will depend on his ongoing workup, recommendations from specialists, and response to his treatment.  Patient understands treatment plan moving forward in his patient awaiting to see recommended specialists.  Prior to onset of symptoms, patient reported being in his usual state of health with no other concerns or complaints.  All other review of systems negative except as noted above.  Patient  admitted to Hospital Medicine inpatient for continued medical management.  Additional history as noted below per  physician at time of transfer.    39 with depression, FAWAD, HTN, alcoholic misuse, primary biliary cirrhosis, alcoholic cirrhosis with intermittent follow up with GI came in for SOB. He was found to have bilateral pleural effusion and pulmonary edema. He has not taking urosdiol and furosemide for at least a couple months and is actively drinking. Bilirubin last checked was 7.8 an is now 20. INR 2. MELD 30. GI at sending facility recommended evaluation at facility with liver transplant capability. Dr. Macias, with heptology, aggreed to evaluate patient on medicine service.    vitals 98.1, 115, 36, 145/58, 79%    SCr 0.97, BUN 9, Na 123, K 3.3, Cl 84, CO2 25, glc 123, Ca 8.3, to prot 7.3., Aln 2.2, t bili 20.3, alk phos 248, , ALT 50, , , INR 2, WC 10, Hgb 105., Hct 29.3, MCV 90, plt 124, neut 84%      PCP: No, Primary Doctor       Overview/Hospital Course:  39 year old male patient w ETOH abuse, GERD, HTN, obesity, and Alcoholic Cirrhosis/hx of PBC, who presented to [facility] yesterday to increased SOB over the past two weeks. Associated symptoms included BLE edema, chronic abdominal bloating, orthopnea, and PND. He denied any associated reese CP, palpitations, near syncope, or syncope. Initial workup at outside facility revealed BNP of 804, bumped LFT's, Tbili 21, hyponatremia (Na 123), and hypoalbuminemia. CXR showed findings consistent with edema/CHF and transfer was requested to our facility for cardiology and hepatology evaluation. Patient resting in bed. Feels ok. Remains SOB/overloaded. He admits to being out of urosdiol and Lasix for the past few months as he did not have any refills. Last echo in 9/23 showed normal EF.      11/18- Appreciate Cardiology and Hepatology input- Looks deeply jaundiced, but otherwise comfortable on RA. AAOx4, no asterixis. Gets dyspneic  while talking. Started on IV Lasix and Rocephin for SBP prevention. Abdomen distended, will get US Abd to r/o ascites and may need paracentesis. Ammonia high- 83, Na and Cl very low- 123/83, Alb 2, T bili 21, direct 14- will add lactulose and may add Rifaximin. Cont IV lasix, strict fluid restriction. May need Aldactone and Inderal. Will d/w Hepatology.     11/19/24- Ammonia high- decr to 57 from 83. Na-123, Cl-83, Alb 2, T bili 23.8, will maintain lactulose at 20mg TID, may add Rifaximin 550mg Q12 after checking with hepatology. Cont IV lasix, strict fluid restriction. Patient's WOB increased over earlier today. Initiated BiPap and indwelling de la cruz.    Interval History:  F/u for transfer from outside facility for liver failure and ascites. Sitting in bed, alert but confused. Dyspnea noted with speech. Tachycardia noted, SBP-90's/30's-50's. Very jaundiced. Continuing IV lasix, potassium repleted 40 mEq PO TID, strict fluid restriction maintained.  Denies chest pain or SOB, dyspnea noted with talking. Pitting 4+ edema to BLE. Family at bedside, updated on patients situation and plan of care.     11/19/24, 18:15  MELD score increased from 30 to 35. Dr Macias agrees to accept patient to the hepatology/transplant service at [facility]. Transportation arranged, awaiting transport.    Review of Systems   Constitutional:  Positive for activity change.   HENT:  Mouth sores: cracked bleedng lips.    Respiratory:  Shortness of breath: dyspnea.    Cardiovascular:  Positive for leg swelling (+4 edema).   Gastrointestinal:  Positive for abdominal distention.   Genitourinary: Negative.    Skin:  Positive for color change (jaundice).   Psychiatric/Behavioral:  Positive for confusion.      Objective:     Vital Signs (Most Recent):  Temp: 98.1 °F (36.7 °C) (11/19/24 0931)  Pulse: (!) 119 (11/19/24 0931)  Resp: 18 (11/19/24 0931)  BP: (!) 99/51 (11/19/24 0931)  SpO2: (!) 94 % (11/19/24 0931) Vital Signs (24h Range):  Temp:   [97.5 °F (36.4 °C)-98.7 °F (37.1 °C)] 98.1 °F (36.7 °C)  Pulse:  [101-126] 119  Resp:  [18-22] 18  SpO2:  [90 %-98 %] 94 %  BP: ()/(39-60) 99/51     Weight: (!) 166.7 kg (367 lb 8.1 oz)  Body mass index is 52.73 kg/m².    Intake/Output Summary (Last 24 hours) at 2024 1008  Last data filed at 2024 0359  Gross per 24 hour   Intake 240 ml   Output 1425 ml   Net -1185 ml         Physical Exam  Vitals and nursing note reviewed. Exam conducted with a chaperone present.   Constitutional:       Appearance: He is obese. He is ill-appearing.   HENT:      Mouth/Throat:      Mouth: Mucous membranes are dry.      Dentition: Abnormal dentition.   Eyes:      General: Scleral icterus present.      Pupils: Pupils are equal, round, and reactive to light.   Cardiovascular:      Rate and Rhythm: Regular rhythm. Tachycardia present.      Pulses: Normal pulses.           Radial pulses are 2+ on the right side and 2+ on the left side.        Dorsalis pedis pulses are 2+ on the right side and 2+ on the left side.      Heart sounds: Normal heart sounds, S1 normal and S2 normal.   Pulmonary:      Effort: Pulmonary effort is normal. Tachypnea present.      Breath sounds: Normal breath sounds and air entry.   Abdominal:      General: Abdomen is protuberant. Bowel sounds are increased. There is distension.      Palpations: Abdomen is soft.   Musculoskeletal:         General: Swelling: BLE edema.      Right lower le+ Edema present.      Left lower le+ Edema present.   Skin:     General: Skin is warm and dry.      Capillary Refill: Capillary refill takes 2 to 3 seconds.      Coloration: Skin is jaundiced.   Neurological:      Mental Status: He is alert. He is disoriented.      GCS: GCS eye subscore is 4. GCS verbal subscore is 5. GCS motor subscore is 6.      Motor: Weakness present. No tremor or seizure activity.   Psychiatric:         Speech: Speech is delayed.         Behavior: Behavior is slowed. Behavior is  cooperative.         Cognition and Memory: Cognition is impaired. Memory is impaired.             Significant Labs: All pertinent labs within the past 24 hours have been reviewed.  Bilirubin:   Recent Labs   Lab 11/17/24 2143 11/18/24 0616 11/19/24 0454   BILIDIR >14.0*  --   --    BILITOT 21.5* 21.3* 23.8*     BMP:   Recent Labs   Lab 11/18/24 0616 11/19/24 0454    100   * 123*   K 3.2* 3.0*   CL 83* 83*   CO2 25 26   BUN 13 19   CREATININE 1.2 1.5*   CALCIUM 8.5* 8.5*   MG 1.6  --      CBC:   Recent Labs   Lab 11/17/24 2143 11/18/24 0616 11/19/24 0454   WBC 8.64 8.56 7.29   HGB 10.1* 9.6* 9.1*   HCT 28.2* 26.5* 25.8*   * 126* 116*     Lactic Acid:   Recent Labs   Lab 11/17/24 2143 11/19/24 0454 11/19/24  0808   LACTATE 2.0 1.3 1.4     POCT Glucose:   Recent Labs   Lab 11/19/24  0133   POCTGLUCOSE 104     Ammonia-57, AST-158, ALT-62    Significant Imaging: I have reviewed all pertinent imaging results/findings within the past 24 hours.    Assessment/Plan:      * Shortness of breath  Patient presented with worsening dyspnea/shortness a breath along with bilateral lower extremity edema in setting of noncompliance with home medications which include metoprolol and furosemide.  Patient also currently consumption alcohol.  Workup at outside facility revealed patient to have evidence of bilateral pleural effusions with pulmonary edema noted on chest x-ray.  Currently saturating 99% on room air in no acute distress.  H/H 10.1/28.2.  BNP elevated at 771.  Echo noted on 09/11/2023 revealed EF 55-65%.  Symptoms likely multifactorial in nature given underlying liver cirrhosis/PBC and possible new onset heart failure. Patient received Lasix 80 mg IV x1 prior to transfer with improvement in symptoms. Cardiology consulted and awaiting further evaluation/recommendations.  Plan:  -NPO  -telemetry  -monitor Is/Os  -daily weights   -f/u echo  -IV diuretics p.r.n.   -titrate oxygen therapy as  needed  -incentive spirometry  -duo nebs p.r.n.  -f/u cardiology     Start IV lasix, check echo  May need Aldactone and Inderal      Acute hepatic encephalopathy  Ammonia decr to 57, continue lactulose. May add Rifaximin after checking with Hepatology      Anasarca sec to Cirrhosis  Cont lasix      Class 3 severe obesity due to excess calories with body mass index (BMI) of 50.0 to 59.9 in adult  Weight loss encouraged and health benefits explained to patient. 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 Co-morbidities are present and inclusive of portal hypertension and esophageal varices.  Hepatitis panel negative on 02/13/2024.  ELIEL, AFP negative on 02/16/2024.  Mitochondrial M2 antibody elevated at 29.0 on 02/16/2024.  MELD-Na score calculated; MELD 3.0: 33 at 11/19/2024  4:54 AM  MELD-Na: 33 at 11/19/2024  4:54 AM  Calculated from:  Serum Creatinine: 1.5 mg/dL at 11/19/2024  4:54 AM  Serum Sodium: 123 mmol/L (Using min of 125 mmol/L) at 11/19/2024  4:54 AM  Total Bilirubin: 23.8 mg/dL at 11/19/2024  4:54 AM  Serum Albumin: 2 g/dL at 11/19/2024  4:54 AM  INR(ratio): 1.9 at 11/17/2024  9:43 PM  Age at listing (hypothetical): 39 years  Sex: Male at 11/19/2024  4:54 AM    Continue chronic meds. 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 Will avoid any hepatotoxic meds, and monitor CBC/CMP/INR for synthetic function. Hepatology consulted and awaiting further evaluation/recommendations.    Cont diuresis, add lactulose  Triple vitamins    GERD (gastroesophageal reflux disease)  Chronic. Stable. Currently asymptomatic. Home medications include PPI/Antacids as needed.  Plan:  -Continue PPI/Antacids       Hypertension  Chronic, controlled.  Latest blood pressure and vitals reviewed-   Temp:  [97.5 °F (36.4 °C)-98.7 °F (37.1 °C)]   Pulse:  [101-126]   Resp:  [18-22]   BP: ()/(39-60)   SpO2:  [90 %-98 %] .    Home meds for hypertension were reviewed and noted below.     While in the hospital, will manage blood pressure as follows; Adjust home antihypertensive regimen as follows- resume home metoprolol once evaluated by Cardiology.  Currently on IV Lasix p.r.n. for chf workup.    Will utilize p.r.n. blood pressure medication only if patient's blood pressure greater than  180/110 and he develops symptoms such as worsening chest pain or shortness of breath.      Generalized abdominal pain  Patient reports chronic generalized abdominal pain unchanged from baseline in his currently followed by GI/hepatology outpatient for liver cirrhosis and PBC as noted below. Patient currently admitted for CHF and liver failure. GI/Hepatology consulted and awaiting further evaluation/recommendations. Outpatient workup as noted below.    Abdominal US 11/1/24  -Cirrhosis. No definite liver mass is seen. Visualization of the liver is limited due to the severe diffuse hyperechogenicity of the liver, which is unlikely due to hepatic steatosis since no significant fat was seen on the recent prior MRI.   -No evidence of ascites. Splenomegaly and recanalization of the umbilical vein are compatible with portal hypertension.   -Limited study due to nonvisualization of the common bile duct.     EGD 4/11/24  -Esophagus: Grade 1 varices. Irregular z-line@38cm  -Stomach: 2x ulcers (punctate antral, 1cm white based flat prepyloric)     Pathology EGD 4/11/24     1. Stomach, biopsy:                                                        - Fragments of gastric oxyntic-type mucosa with focal erosion and reactive epithelial changes.                                        - Negative for intestinal metaplasia, dysplasia or malignancy.          - Immunostain for H. pylori is negative for Helicobacter pylori organisms.                                                             2. Esophagus, GE junction, biopsy:                                         -  Fragments of gastric cardia-type mucosa with chronic inflammation, foveolar hyperplasia and reactive epithelial changes.                                                           - Negative for intestinal metaplasia, dysplasia or malignancy.       MRI Abdomen W/WO 3/12/24   -Cirrhosis and portal hypertension with recanalized umbilical vein, varices, and splenomegaly.     Liver Biopsy 3/8/24  - Steatohepatitis with moderate steatosis, mild activity and well-developed cirrhosis.                                               - Rare noncaseating granulomas. See comment.     Plan:  -NPO  -Continue current pain regimen, titrate as needed  -Bowel regimen  -Bedrest  -Antiemetics prn  -Tylenol as needed for fever   -Continue antibiotics   -f/u Hepatology    Sec to Ascites, check US abd-   May need paracentesis      VTE Risk Mitigation (From admission, onward)           Ordered     Reason for No Pharmacological VTE Prophylaxis  Once        Question:  Reasons:  Answer:  Physician Provided (leave comment)  Comment:  pending potential procedure    11/17/24 2056     IP VTE LOW RISK PATIENT  Once         11/17/24 2056     Place sequential compression device  Until discontinued         11/17/24 2056                    Discharge Planning   KENNEY:      Code Status: Full Code   Is the patient medically ready for discharge?:     Reason for patient still in hospital (select all that apply): Patient trending condition  Discharge Plan A: Home with family                  ZACHARIAH Dozier-C  Department of Hospital Medicine   [facility] - Med Surg

## 2024-11-19 NOTE — CONSULTS
"Food & Nutrition Education       Diet Education: Cardiac, Low sodium, Fluid restriction diet     Learners: Patient and pt's family       Nutrition Education provided with handouts:   "Heart Healthy Nutrition Therapy"  "Fluid Restricted Nutrition Therapy" ([email or web address])     Nutrition Education attached to pt's discharge papers:   "Weight Loss Diet"   "Diet for Cirrhosis"      Comments:   PMH: Generalized abdominal pain, HTN, GERD, Hepatic cirrhosis d/t primary biliary cholangitis and Alcoholic cirrhosis, Class 3 severe obesity, Anasarca 2/2 cirrhosis, Acute hepatic encephalopathy    39 y.o. Male admitted for Shortness of breath. Pt currently on a Cardiac diet. Visited pt at bedside, pt resting in bed, pt family present, note some AMS noted. Pt's family confirmed pt had a good appetite of 2-3 meals/day PTA and currently 50% PO intake of meals. Pt reported his UBW is 360 lbs, current weight charted 11/19/24 367 lbs.     RD educated patient on low sodium, general healthful diet r/t recent hospital diagnosis. Recommended a well balanced diet with a variety of fresh foods, fruits and vegetables (5 cups/day), whole grains (3 oz/day), and fat-free or low fat dairy. Discussed reading food packages, food labels, and nutrition facts labels to identify nutrient content of foods.       Discussed the importance of limiting sodium to less than 2,000 mg per day. Recommended salt free seasonings and other herbs and spices in meals to enhance flavor without additional sodium.       Discussed dietary sources of cholesterol, the importance of incorporating healthy fats into the diet, and avoiding saturated and trans fats for heart health. For a generally healthy diet, aim for total fat less than 25-35% of calories.       Discussed the importance of fiber (especially soluble fiber), dietary sources, and a goal intake of >20-30g/day.     Discussed fluid restriction per MD and dietary sources of fluid. RD recommended using a " cup with measurements for fluids and to try to consume small sips spread throughout the day rather than a lot at one time.      Pt's sister stated that she does the cooking and pt's family member stated that he is also on a Cardiac diet so he understands what it entails, ex low sodium. Discussed low sodium seasonings. Pt with noted AMS, pt's family expressed understanding and appreciation for nutrition education provided. Encouraged pt and pt's family to read and use RD contact information provided on handouts with any further questions/concerns they may have. Unable to determine pt's stage of change d/t AMS. Will discuss nutrition education at follow up.     Nutrition Related Social Determinants of Health: SDOH: Unable to assess at this time.       Visual NFPE performed, pt appears well nourished, will perform at follow up if warranted.    Note pt has jaundice eyes and skin.   All questions and concerns answered.   Provided handout with dietitian's contact information.   *Please re-consult as needed.   Thank you,   Carol Hernandez, BS, RDN, LDN

## 2024-11-19 NOTE — ASSESSMENT & PLAN NOTE
Chronic, controlled.  Latest blood pressure and vitals reviewed-   Temp:  [97.5 °F (36.4 °C)-98.7 °F (37.1 °C)]   Pulse:  [101-126]   Resp:  [18-22]   BP: ()/(39-60)   SpO2:  [90 %-98 %] .   Home meds for hypertension were reviewed and noted below.     While in the hospital, will manage blood pressure as follows; Adjust home antihypertensive regimen as follows- resume home metoprolol once evaluated by Cardiology.  Currently on IV Lasix p.r.n. for chf workup.    Will utilize p.r.n. blood pressure medication only if patient's blood pressure greater than  180/110 and he develops symptoms such as worsening chest pain or shortness of breath.

## 2024-11-19 NOTE — AI DETERIORATION ALERT
Artificial Intelligence Notification  [facility]  [address] [place] [address]  Phone: [phone]    This documentation was triggered by an Artificial Intelligence Notification:    Admit Date: 2024   LOS: 2  Code Status: Full Code  : 1985  Age: 39 y.o.  Weight:   Wt Readings from Last 1 Encounters:   24 (!) 166.7 kg (367 lb 8.1 oz)        Sex: male  Bed: A541/A541 A  MRN: [id number]  Attending Physician: Irma Napier MD     Date of Alert: 2024  Time AI Alert Received: 1:20 pm            Vitals:    24 1255   BP:    Pulse: (!) 115   Resp:    Temp:      SpO2: (!) 93 %      Artificial Intelligence alert discussed with Provider:     Name: Irma Napier MD   Date/Time of Provider Notification: 1:20 pm    Patient Condition: fair    Pt remains sick, AAOx4, tachycardic, tachypneic, same as this morning. Low BP, he has received Albumin infusion. Pt awaits transfer to [facility] main for Liver Transplant team eval for MELD > 30.

## 2024-11-20 PROBLEM — J96.01 ACUTE HYPOXIC RESPIRATORY FAILURE: Status: ACTIVE | Noted: 2024-01-01

## 2024-11-20 NOTE — SIGNIFICANT EVENT
39-year-old white man currently admitted for jaundice, decompensated PVC/cirrhosis versus alcohol-related hepatitis, alcohol abuse, acute hepatic encephalopathy, edema/anasarca, volume overload due to cirrhosis/CHF with elevated BNP, hypotension (previously with hypertension), and gastroesophageal reflux disease.  Consults on this admission include tele hepatology, Cardiology, nutrition.  Patient is pending transfer to [facility] for higher level of care.    Nursing staff notified of patient being more confused and combative and requiring soft restraints now.  Recent vital signs with blood pressure 113/51 heart rate 114 respiratory rate 18 temperature 97.8° and afebrile and pulse ox 94%.  Patient is alert and awake although somewhat disoriented and agitated(he is able to answer some questions), jaundiced, scleral icterus, tachycardic, gross anasarca, + de la cruz in place, no respiratory distress appreciated.  Labs from yesterday with white blood cell count 7.29, hemoglobin 9.1, platelets 116, sodium 123, potassium 3.0, creatinine 1.5, alk phos 215, total bilirubin 23.8, , ALT 62, ammonia level 57.  Previous labs with , CK level 913, troponin negative, acute hepatitis panel negative, procalcitonin level 0.62, lactic acid level 1.4.  Abdominal ultrasound with no significant ascites.  Echocardiogram with EF 65%, normal diastolic function.    Impression/plan:  1. Decompensated PBC/cirrhosis versus alcohol-related hepatitis  2. Alcohol abuse with possible withdrawal  3. Jaundice with elevated liver enzymes  4. Acute hepatic encephalopathy  -increased confusion and now with combativeness/agitation  -currently requiring soft restraints  -continue lactulose  5. Anasarca/edema, volume overload related to cirrhosis/CHF  -continue IV Lasix and spironolactone   6. Hypotension  7.  Gastroesophageal reflux disease  -continue PPI    -stat CBC, CMP, magnesium level, lactic acid level, CK level, ABG, and INR have been  ordered  -start midodrine 5 mg p.o. t.i.d., 1st dose stat  -give albumin 25% 25 g IV x1 dose stat  -CIWA protocol has been initiated, give Ativan 2 mg IV x1 dose as soon as possible  -Phong's discriminant function calculated at 63.8 with poor overall prognosis--> start prednisolone 40 mg p.o. daily, 1st dose stat  -continue IV ceftriaxone  -check urinalysis with reflex culture    Addendum 11/20/2024 at 5:01 a.m.:  Patient is resting more comfortably now.  Repeat blood pressure 129/66.  CBC with white blood cell count 9.38, hemoglobin 9.3, hematocrit 26.9, platelets 110; CMP with sodium 124, potassium 2.9, magnesium level 1.8, creatinine 1.9, alk phos 216, total bilirubin 24.4, , ALT 64, repeat ammonia level 57.  -give potassium bicarbonate 40 mEq p.o.  -give potassium chloride 40 mEq IV  -repeat potassium level at noon today  -give magnesium sulfate 2 g IV  -urinalysis with reflex culture pending  -increase lactulose to q.6 hours  -de la cruz catheter in place, strict I/Os  -nephrology consult as hepatorenal syndrome is a concern    Addendum 11/20/2024 at 6:31 a.m.:  Patient desaturated to 86% on 5 L nasal cannula.  O2 saturations 90% on 6 L nasal cannula and now on 15 L O2 with saturation of 95%.  -ordered stat chest x-ray, ABG, and DuoNeb x1  -spoke with Nidhi the critical care nurse practitioner and we will transfer patient to the ICU now  -updated and answered questions from father and stepmother at bedside--> they are aware of need for higher level of care in the ICU and patient remains full code at this time

## 2024-11-20 NOTE — HPI
Mr. Mendoza is a 39-year-old male with past medical history of PBC cirrhosis, ETOH abuse, anasarca, GERD, HTN, morbid obesity, vitamin D deficiency who was transferred from outside facility for hepatology involvement and higher level of care. Patient had evidence of HF exacerbation and decompensated cirrhosis. Reports worsening shortness of breath and lower extremity swelling over the past couple weeks prompting him to report to ED for evaluation. Symptoms worse with exertion and lying flat. He also decribes associated abdominal pain/nausea (which he has at baseline). He has had difficulty refilling his urosdiol and furosemide so he hasn't been taking either of those over the past couple months. Denied any fever, chills, sick contact, vomiting, chest pain. Notable lab workup at outside facility revealed T bili 20, INR 2, MELD score 30. Dr. Macias with hepatology agreed with transfer to [facility].     On admission, patient comfortable on RA, AAOx4, notable jaundice. Noticed some conversational dyspnea. He was started on IV lasix + rocephin for SBP prevention. Abdominal notably distended, US abdomen with no significant ascites though. Lactulose + Rifaxamin added to regimen, along with fluid restrictions. Yesterday, patient had increased wob throughout the day, placed on bipap yesterday evening. Worsening hepatic encephalopathy, jaundice, dyspnea over course of day, decision was made for transfer to [facility] for transplant involvement.     Pending transfer, patient became more combative, confused overnight. Tachycardic with HR in 110s but other vitals stable. Patient given ativan 2mg with concern for withdrawals. Stat labs ordered. ABG obtained. Desats earlier this morning and repeat CXR with bilateral pleural effusion and there was concern for possible aspiration at some point earlier in the night. IV abx were broadened. Has had good UOP with IV 40 lasix bid. He was transitioned to vapotherm 20/50 on arrival to unit and  mentation has improved throughout the morning. Transfer service contacted again regarding upgraded status to ICU. Dr. Lea, MICU at [facility] agreed to acceptance. Awaiting transfer at this time.

## 2024-11-20 NOTE — ASSESSMENT & PLAN NOTE
- Diffuse anasarca 2/2 decompensated liver cirrhosis  - Continue with IV diuresis  - Pending transfer to main [place] for hepatology/transplant involvement

## 2024-11-20 NOTE — PLAN OF CARE
Plan of care reviewed with pt and pt's family at bedside.  Pt remains confused to situation and will speak phrases not appropriate to context. Cooperative, calm, and alert. No other signs of etoh withdrawal.  Tolerating vapotherm.  IV abx as ordered, electrolytes replaced as appropriate.  Mccoy catheter remains in use, draining dark akilah urine.  Midline placed this shift.  Cleared by SLP to reinitiate diet.  Turned/repositioned frequently with wedge/pillow support and heels floated.  SCDs in use.   Bed alarm armed, call light within reach.  Awaiting available bed for transfer to main [place].

## 2024-11-20 NOTE — ASSESSMENT & PLAN NOTE
- Body mass index is 52.89 kg/m². Morbid obesity complicates all aspects of disease management from diagnostic modalities to treatment. Weight loss encouraged and health benefits explained to patient.

## 2024-11-20 NOTE — PT/OT/SLP EVAL
Speech Language Pathology Evaluation  Cognitive/Bedside Swallow    Patient Name:  Marcellus Mendoza   MRN:  [id number]  Admitting Diagnosis: Shortness of breath    Recommendations:                  General Recommendations:  Dysphagia therapy, Ongoing cognitive-linguistic evaluation  Diet recommendations:  Minced & Moist Diet - IDDSI Level 5, Thin liquids - IDDSI Level 0   Aspiration Precautions: 1 bite/sip at a time, Eliminate distractions, Feed only when awake/alert, Frequent oral care, HOB to 90 degrees, Meds whole buried in puree, Monitor for s/s of aspiration, Small bites/sips, and Standard aspiration precautions   General Precautions: Standard, aspiration  Communication strategies:  provide increased time to answer s/t SOB    Assessment:     Marcellus Mendoza is a 39 y.o. male admitted to [facility] with dx shortness of breath. He presents with functional OM at bedside and is appreciated w continued SOB limiting length of utterances and vocal quality. Pt with episode of AMS this morning and respiratory decline following PO intake of liquid medication and then transfer to the ICU. Per family, pt was lying down while swallowing. Pt presents w improved mentation. No overt s/s of aspiration present during bedside CSE, and pt recommended for IDDSI 5-minced and moist solids with IDDSI 0-thin liquids, following standard aspiration precautions & medications taken whole in puree. Contributing risk factors for dysphagia include deficits in respiratory-swallow coordination and AMS. ST also discussed w pt importance of taking small/single bites/sips to improve respiratory-swallow coordination.  ST to follow-up to continue to assess diet tolerance/advancement and cognitive-linguistic ability.     History:     Past Medical History:   Diagnosis Date    Alcohol abuse     Anasarca     GERD (gastroesophageal reflux disease)     Hypertension     Obesity, unspecified     Unspecified cirrhosis of liver     Vitamin D deficiency         Past Surgical History:   Procedure Laterality Date    ORIF, FRACTURE, ANKLE, BIMALLEOLAR Right        Social History: Patient lives in [place], [place].    Prior Intubation HX:  NA    Modified Barium Swallow: NA    XR CHEST AP PORTABLE on 11/20/2024:     FINDINGS:  Single view of the chest.  No comparison.     Cardiomegaly with pulmonary vascular congestion and suspected bilateral interstitial edema pattern.  No evidence of pleural effusion or pneumothorax.  Bones appear intact.     Impression:  Pulmonary edema     Electronically signed by:Saturnino Marcos MD  Date:                                            11/20/2024  Time:                                           07:41    XR CHEST 1 VIEW on 11/17/2024:    IMPRESSION:   1.  Mild cardiomegaly vascular congestion appear slightly improved. Central peribronchial thickening with bilateral mid and lower lung groundglass, alveolar and confluent airspace opacities are now present, greater on the right. Small bilateral pleural effusions are now present.   2.  The above findings may represent edema/CHF with partial correction, although infection cannot be excluded. Correlation clinical findings and follow-up is recommended.   3.  The osseous and soft tissues show no acute finding.     Prior diet: regular diet.    Subjective     Pt seen bedside for ST mayen, mom and sister present throughout  Patient goals: to eat and drink     Pain/Comfort:  Pain Rating 1: 0/10  Pain Rating Post-Intervention 1: 0/10  Pain Rating 2: 0/10  Pain Rating Post-Intervention 2: 0/10    Respiratory Status:  Vapotherm    Objective:     Cognitive Status:    Pt oriented to self, location, and time, episode of AMS this morning, pt unable to recall why he was transferred to ICU. Adequate attention appreciated. Some short term memory deficits appreciated.        Receptive Language:   Comprehension:   WFL in conversation throughout    Pragmatics:    WFL    Expressive Language:  Verbal:    Intermittent  word finding deficits, short utterances limited by respiratory support.      Motor Speech:  WFL    Voice:   Quality Breathy and low intensity. Pt SOB throughout    Oral Musculature Evaluation  Oral Musculature: WFL  Structural Abnormalities: Pt bottom lip w dry wound, pt unsure of how he got it  Dentition: present and adequate  Secretion Management: adequate  Mucosal Quality: good  Mandibular Strength and Mobility: WFL  Oral Labial Strength and Mobility: WFL  Lingual Strength and Mobility: WFL  Velar Elevation: WFL  Buccal Strength and Mobility: WFL  Volitional Cough: appreciated  Volitional Swallow: present  Voice Prior to PO Intake: clear    Bedside Swallow Eval:     Clinical Swallow Examination:   Of note, patient self-fed/was fed by SLP throughout evaluation. Patient presented with:     CONSISTENCY  NOTES   THIN (IDDSI 0) Cup/straw sips   No overt s/s of aspiration appreciated. Pt w audible deglutition.      PUREE (IDDSI 4/Extremely Thick)   TSP/TBSP bites of pudding No overt s/s of aspiration appreciated.    SOLID (IDDSI 7/Regular) Bite of apurva cracker   No overt s/s of aspiration appreciated. Prolonged mastication w recollection w SOB throughout.        Thickened liquids were not used in this assessment. OOlive (2018) reported that thickened liquids have no sound evidence at reducing the risk of pneumonia in patients with dysphagia and can cause harm by increasing their risk of dehydration. It also presents an increased risk of UTI, electrolyte imbalance, constipation, fecal impaction, cognitive impairment, functional decline and even death (Langmore, 2002; Anahi, 2016).  Thickened liquids are associated with risks including dehydration, increased pharyngeal residue, potential interference with medication absorption, and decreased quality of life (Christoph, 2013). Thickened liquids are also more likely to be silently aspirated than thin liquids (Harman et al., 2018). This supports the assertion that we  should confirm a patient requires thickened liquids with an instrumental swallow study prior to recommending them.    References:   Christoph BEAR (2013). Thickening agents used for dysphagia management: Effect on bioavailability of water, medication and feelings of satiety. Nutrition Journal, 12, 54. https://doi.org/10.1186/0712-5045-67-54    CHEMA Hammer, GREGORY Whitt, REYES Porter, & CHEMA Yancey (2018). Cough response to aspiration in thin and thick fluids during FEES in hospitalized inpatients. International journal of language & communication disorders, 53(5), 909-918. https://doi.org/10.1111/4720-1261.40952    INTERPRETATION AND RISK ASSESSMENT:  Clinical swallow evaluation (CSE) revealed oral phase characterized by lingual, labial, and buccal strength and range of motion adequate for lip closure, bolus preparation and propulsion. The patient had no anterior loss of the bolus with complete closure of the lips around the utensils. No residue remained in the oral cavity following the swallow. Patient without overt clinical signs/symptoms of aspiration on any PO trials given. Contributing risk factors for dysphagia include deficits in respiratory-swallow coordination and AMS. Pt encouraged to take small bites/sips and single bites/sips to improve respiratory-swallow coordination.     Goals:   Multidisciplinary Problems       SLP Goals          Problem: SLP    Goal Priority Disciplines Outcome   SLP Goal     SLP    Description: TPW safely consume least restrictive PO diet  TPW independently implement safe swallow strategies                       Plan:     Patient to be seen:  2 x/week, 3 x/week   Plan of Care expires:  11/27/24  Plan of Care reviewed with:  patient   SLP Follow-Up:  Yes       Discharge recommendations:  Therapy Intensity Recommendations at Discharge:  (pending pt progress)   Barriers to Discharge:  None    Time Tracking:     SLP Treatment Date:   11/20/24  Speech Start Time:  1117  Speech Stop Time:   1145     Speech Total Time (min):  28 min    Billable Minutes: Eval 13  and Eval Swallow and Oral Function 15    Debora Boris Maguire MA, PL-SLP, CF-SLP  Speech Language Pathologist  11/20/2024

## 2024-11-20 NOTE — PROGRESS NOTES
[facility]  Hepatology  Progress Note    Patient Name: Marcellus Mendoza  MRN: [id number]  Admission Date: 11/17/2024  Hospital Length of Stay: 3 days  Attending Provider: Saturnino Flor MD   Primary Care Physician: No, Primary Doctor  Principal Problem:Shortness of breath    Subjective:     Transplant status: No    HPI: Marcellus Mendoza is a 39 y.o. male with a PMH PBC cirrhosis ( Biopsy Proven), admitted with ascites, HE and Jaundice. Patient has been diagnosed as PBC cirrhosis on liver biopsy during evaluation of elevated liver enzymes in 2023 at [facility] by GI and started on Ursodiol. He did see GI at [place] but did not follow after firstv2 visits. He has not taking urosdiol and furosemide for at least a couple months. He also mentions drinking alcohol but amount could not assesses due to HE . He has been now admitted with fluid overload  and jaundice worsening over last few weeks. His MELD 30 on admission. Denies hematemesis or melena. He has been on IV lasix since admission       Interval History: Significant HE and worsening Jaundice  Patient has been shifted to ICU    Current Facility-Administered Medications   Medication    acetaminophen tablet 650 mg    albuterol-ipratropium 2.5 mg-0.5 mg/3 mL nebulizer solution 3 mL    aluminum-magnesium hydroxide-simethicone 200-200-20 mg/5 mL suspension 30 mL    cholecalciferol (vitamin D3) 125 mcg (5,000 unit) tablet 5,000 Units    dextrose 10% bolus 125 mL 125 mL    dextrose 10% bolus 250 mL 250 mL    folic acid-vit B6-vit B12 2.5-25-2 mg tablet 1 tablet    furosemide injection 60 mg    glucagon (human recombinant) injection 1 mg    glucose chewable tablet 16 g    glucose chewable tablet 24 g    influenza (Flulaval, Fluzone, Fluarix) 45 mcg/0.5 mL IM vaccine (> or = 6 mo) 0.5 mL    lactulose 20 gram/30 mL solution Soln 20 g    LORazepam (ATIVAN) injection 2 mg    melatonin tablet 6 mg    midodrine tablet 5 mg    multivitamin tablet    mupirocin 2  % ointment    naloxone 0.4 mg/mL injection 0.02 mg    ondansetron injection 4 mg    pantoprazole injection 40 mg    piperacillin-tazobactam (ZOSYN) 4.5 g in D5W 100 mL IVPB (MB+)    pneumoc 20-stefany conj-dip cr(PF) (PREVNAR-20 (PF)) injection Syrg 0.5 mL    promethazine tablet 25 mg    senna-docusate 8.6-50 mg per tablet 1 tablet    sodium chloride 0.9% flush 10 mL    spironolactone tablet 100 mg    thiamine tablet 100 mg    ursodioL capsule 600 mg    vancomycin - pharmacy to dose    vancomycin 2 g in dextrose 5 % 500 mL IVPB       Objective:     Vital Signs (Most Recent):  Temp: 98.6 °F (37 °C) (11/20/24 1102)  Pulse: (!) 115 (11/20/24 1200)  Resp: (!) 37 (11/20/24 1200)  BP: (!) 121/58 (11/20/24 1200)  SpO2: (!) 93 % (11/20/24 1200) Vital Signs (24h Range):  Temp:  [97.7 °F (36.5 °C)-98.6 °F (37 °C)] 98.6 °F (37 °C)  Pulse:  [108-126] 115  Resp:  [18-42] 37  SpO2:  [89 %-97 %] 93 %  BP: ()/(45-70) 121/58     Weight: (!) 167.2 kg (368 lb 9.8 oz) (11/20/24 0745)  Body mass index is 52.89 kg/m².    Physical Exam  Constitutional:       Appearance: Normal appearance.   HENT:      Head: Normocephalic and atraumatic.      Right Ear: Tympanic membrane and external ear normal.      Left Ear: Tympanic membrane and external ear normal.      Mouth/Throat:      Mouth: Mucous membranes are moist.   Eyes:      General: Scleral icterus present.      Extraocular Movements: Extraocular movements intact.      Pupils: Pupils are equal, round, and reactive to light.   Cardiovascular:      Rate and Rhythm: Normal rate and regular rhythm.      Pulses: Normal pulses.      Heart sounds: Normal heart sounds.   Pulmonary:      Effort: Pulmonary effort is normal. No respiratory distress.   Abdominal:      General: Bowel sounds are normal. There is no distension.      Palpations: Abdomen is soft. There is no mass.   Musculoskeletal:         General: Swelling present. No deformity. Normal range of motion.      Cervical back: Normal range  of motion and neck supple.   Skin:     Coloration: Skin is not jaundiced.   Neurological:      General: No focal deficit present.      Mental Status: He is alert and oriented to person, place, and time.      Cranial Nerves: No cranial nerve deficit.   Psychiatric:         Mood and Affect: Mood normal.         Behavior: Behavior normal.          Significant Labs:  CBC:   Recent Labs   Lab 11/20/24 0226   WBC 9.38   RBC 2.91*   HGB 9.3*   HCT 26.9*   *     CMP:   Recent Labs   Lab 11/20/24 0226 11/20/24  1038     --    CALCIUM 8.6*  --    ALBUMIN 2.2*  --    PROT 6.7  --    *  --    K 2.9* 3.5   CO2 25  --    CL 83*  --    BUN 23*  --    CREATININE 1.9*  --    ALKPHOS 216*  --    ALT 64*  --    *  --    BILITOT 24.4*  --      Coagulation:   Recent Labs   Lab 11/17/24  2143 11/20/24  0234   INR 1.9* 1.8*   APTT 39.3*  --        Significant Imaging:  US: Reviewed      Assessment/Plan:     Active Diagnoses:    Diagnosis Date Noted POA    PRINCIPAL PROBLEM:  Shortness of breath [R06.02] 11/17/2024 Yes    Acute hypoxic respiratory failure [J96.01] 11/20/2024 Unknown    Hypertension [I10] 11/18/2024 Yes     Chronic    GERD (gastroesophageal reflux disease) [K21.9] 11/18/2024 Yes     Chronic    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 Decompensated PBC cirrhosis   Plan  Avoid steroids considering worsening MELD to  reduce the risk of infection  Supportive care  Culture screen, Diagnostic para to rule out infection  CT liver and tumor markers AFP  Transfer the patient to [facility] for higher care  Prognosis explained  Palliative care consult        PBC  Continue on Ursodiol     --Cirrhosis  Liver disease:                   primary biliary cholangitis  MELD-Na:                         33  Child-Zarate Class:             C  Cirrhosis is decompensated with ascites, HE and jaundice  Discussed prognosis with family\  Refer to [facility] for higher care and discussed transplant evaluation  Transplant status:  Concern over substance abuse. Good family support. Discussed transfer patient to [facility] transplant center. Hepatologist informed. Discussed with family         --Ascites/Edema: IV diuresis. Plan US liver and paracentesis  Echo card        - Encephalopathy: Continue lactulose. Titrate to maintain 3-4 bowel movements per day..    The patient location is: ([place])  The chief complaint leading to consultation is: Cirrhosis     Visit type: audiovisual     Face to Face time with patient: 25     40 minutes of total time spent on the encounter, which includes face to face time and non-face to face time preparing to see the patient (eg, review of tests), Obtaining and/or reviewing separately obtained history, Documenting clinical information in the electronic or other health record, Independently interpreting results (not separately reported) and communicating results to the patient/family/caregiver, or Care coordination (not separately reported).     Each patient to whom he or she provides medical services by telemedicine is:  (1) informed of the relationship between the physician and patient and the respective role of any other health care provider with respect to management of the patient; and (2) notified that he or she may decline to receive medical services by telemedicine and may withdraw from such care at any time.         Thank you for your  consult. I will follow-up with patient. Please contact us if you have any additional questions.    Harmeet Macias MD  Hepatology  [facility] - Med Surg

## 2024-11-20 NOTE — SIGNIFICANT EVENT
Patient has become disoriented and combative, nursing staff unable to redirect him.  Restraints ordered for patient safety, though staff feels restraints might make him more agitated, so they will hold off on restraints for now, he does have family at bedside.    STAT labs ordered including ammonia, CBC, CMP, magnesium, lactic acid, CK and ABG with lytes    Also discussed with Dr. Leanne Valenzuela

## 2024-11-20 NOTE — ASSESSMENT & PLAN NOTE
- Patient with Hypoxic Respiratory failure which is Acute.  he is not on home oxygen. Supplemental oxygen was provided and noted- Oxygen Concentration (%):  [6-50] 50  - Signs/symptoms of respiratory failure include- increased work of breathing and respiratory distress. Contributing diagnoses includes - CHF, Obesity Hypoventilation, and Pneumonia Labs and images were reviewed. Patient Has recent ABG, which has been reviewed.   - Increased pulmonary congestion in setting of acute decompensated liver disease, possible aspiration?  - Continue IV abx  - O2 supplementation and wean as able, required bipap on floor but quickly transitioned to vapotherm 20/50 appears to be tolerating, maintain sats >92%  - Continue IV diuresis and monitor UOP  - Echo with normal systolic/diastolic function but   - Fluid restriction, monitor I&Os, daily weights.

## 2024-11-20 NOTE — CONSULTS
[facility] - [facility]ensive [facility] ([facility])  Critical Care Medicine  Consult Note    Patient Name: Marcellus Mendoza  MRN: [id number]  Admission Date: 11/17/2024  Hospital Length of Stay: 3 days  Code Status: Full Code  Attending Physician: Saturnino Flor MD   Primary Care Provider: Katlin, Primary Doctor   Principal Problem: Shortness of breath    Inpatient consult to Critical Care Medicine  Consult performed by: Von Pyle PA-C  Consult ordered by: Leanne Valenzuela MD        Subjective:     HPI:  Mr. Mendoza is a 39-year-old male with past medical history of PBC cirrhosis, ETOH abuse, anasarca, GERD, HTN, morbid obesity, vitamin D deficiency who was transferred from outside facility for hepatology involvement and higher level of care. Patient had evidence of HF exacerbation and decompensated cirrhosis. Reports worsening shortness of breath and lower extremity swelling over the past couple weeks prompting him to report to ED for evaluation. Symptoms worse with exertion and lying flat. He also decribes associated abdominal pain/nausea (which he has at baseline). He has had difficulty refilling his urosdiol and furosemide so he hasn't been taking either of those over the past couple months. Denied any fever, chills, sick contact, vomiting, chest pain. Notable lab workup at outside facility revealed T bili 20, INR 2, MELD score 30. Dr. Macias with hepatology agreed with transfer to [facility].     On admission, patient comfortable on RA, AAOx4, notable jaundice. Noticed some conversational dyspnea. He was started on IV lasix + rocephin for SBP prevention. Abdominal notably distended, US abdomen with no significant ascites though. Lactulose + Rifaxamin added to regimen, along with fluid restrictions. Yesterday, patient had increased wob throughout the day, placed on bipap yesterday evening. Worsening hepatic encephalopathy, jaundice, dyspnea over course of day, decision was made for transfer to [facility] for transplant  involvement.     Pending transfer, patient became more combative, confused overnight. Tachycardic with HR in 110s but other vitals stable. Patient given ativan 2mg with concern for withdrawals. Stat labs ordered. ABG obtained. Desats earlier this morning and repeat CXR with bilateral pleural effusion and there was concern for possible aspiration at some point earlier in the night. IV abx were broadened. Has had good UOP with IV 40 lasix bid. He was transitioned to vapotherm 20/50 on arrival to unit and mentation has improved throughout the morning. Transfer service contacted again regarding upgraded status to ICU. [facility]. Awaiting transfer at this time.     Past Medical History:   Diagnosis Date    Alcohol abuse     Anasarca     GERD (gastroesophageal reflux disease)     Hypertension     Obesity, unspecified     Unspecified cirrhosis of liver     Vitamin D deficiency      Past Surgical History:   Procedure Laterality Date    ORIF, FRACTURE, ANKLE, BIMALLEOLAR Right      Review of patient's allergies indicates:  No Known Allergies    Family History       Problem Relation (Age of Onset)    Breast cancer Mother    Diabetes Father          Tobacco Use    Smoking status: Former     Current packs/day: 0.00     Types: Cigarettes     Quit date: 2023     Years since quittin.3    Smokeless tobacco: Never   Substance and Sexual Activity    Alcohol use: Not Currently     Alcohol/week: 5.0 standard drinks of alcohol     Types: 5 Drinks containing 0.5 oz of alcohol per week     Comment: reports last drink     Drug use: Not Currently    Sexual activity: Yes       Review of Systems   Constitutional:  Negative for chills, fatigue and fever.   Respiratory:  Positive for shortness of breath. Negative for cough and wheezing.    Cardiovascular:  Positive for leg swelling.   Gastrointestinal:  Positive for abdominal distention and abdominal pain. Negative for nausea and  vomiting.   Genitourinary:  Positive for dysuria.   Musculoskeletal:  Negative for back pain.   Neurological:  Negative for dizziness.   Psychiatric/Behavioral:  Positive for confusion. Negative for agitation.      Objective:     Vital Signs (Most Recent):  Temp: 98.6 °F (37 °C) (11/20/24 1102)  Pulse: (!) 115 (11/20/24 1200)  Resp: (!) 37 (11/20/24 1200)  BP: (!) 121/58 (11/20/24 1200)  SpO2: (!) 93 % (11/20/24 1200) Vital Signs (24h Range):  Temp:  [97.7 °F (36.5 °C)-98.6 °F (37 °C)] 98.6 °F (37 °C)  Pulse:  [108-126] 115  Resp:  [18-42] 37  SpO2:  [89 %-97 %] 93 %  BP: ()/(45-70) 121/58     Weight: (!) 167.2 kg (368 lb 9.8 oz)  Body mass index is 52.89 kg/m².  Intake/Output Summary (Last 24 hours) at 11/20/2024 1237  Last data filed at 11/20/2024 1204  Gross per 24 hour   Intake 1017 ml   Output 2635 ml   Net -1618 ml     Physical Exam  Vitals reviewed.   Constitutional:       Appearance: He is ill-appearing.   HENT:      Mouth/Throat:      Mouth: Mucous membranes are moist.      Comments: Poor dentition   Eyes:      General: Scleral icterus present.   Cardiovascular:      Rate and Rhythm: Regular rhythm. Tachycardia present.   Pulmonary:      Breath sounds: Rales present. No wheezing.      Comments: On Vapotherm 20/50  Conversational dyspnea noted  Decreased breath sounds bilaterally due to body habitus  Abdominal:      General: There is distension.      Palpations: Abdomen is soft.      Tenderness: There is no abdominal tenderness.   Musculoskeletal:      Right lower leg: Edema present.      Left lower leg: Edema present.   Skin:     Coloration: Skin is jaundiced.   Neurological:      General: No focal deficit present.      Mental Status: He is oriented to person, place, and time.     Vents:  Oxygen Concentration (%): 50 (11/20/24 1122)    Lines/Drains/Airways       Drain  Duration                  Urethral Catheter 11/19/24 1847 16 Fr. <1 day              Peripheral Intravenous Line  Duration                   Midline Catheter - Single Lumen 11/20/24 1152 Right cephalic vein (lateral side of arm) 20g x 8cm <1 day         Peripheral IV - Single Lumen 11/20/24 0420 20 G Anterior;Right Forearm <1 day                  Significant Labs:    CBC/Anemia Profile:  Recent Labs   Lab 11/19/24 0454 11/20/24 0226   WBC 7.29 9.38   HGB 9.1* 9.3*   HCT 25.8* 26.9*   * 110*   MCV 88 92   RDW 24.6* 25.4*     Chemistries:  Recent Labs   Lab 11/19/24  0454 11/20/24 0226 11/20/24  1038   * 124*  --    K 3.0* 2.9* 3.5   CL 83* 83*  --    CO2 26 25  --    BUN 19 23*  --    CREATININE 1.5* 1.9*  --    CALCIUM 8.5* 8.6*  --    ALBUMIN 2.0* 2.2*  --    PROT 6.7 6.7  --    BILITOT 23.8* 24.4*  --    ALKPHOS 215* 216*  --    ALT 62* 64*  --    * 154*  --    MG  --  1.8  --    PHOS  --  2.3*  --      ABGs:   Recent Labs   Lab 11/20/24  0640   PH 7.440   PCO2 42.9   HCO3 29.1*   POCSATURATED 97   BE 5*     Bilirubin:   Recent Labs   Lab 11/17/24  2143 11/18/24  0616 11/19/24 0454 11/20/24 0226   BILIDIR >14.0*  --   --   --    BILITOT 21.5* 21.3* 23.8* 24.4*     Coagulation:   Recent Labs   Lab 11/20/24  0234   INR 1.8*     Lactic Acid:   Recent Labs   Lab 11/19/24  0454 11/19/24  0808 11/20/24  0226   LACTATE 1.3 1.4 1.0     POCT Glucose:   Recent Labs   Lab 11/19/24  0133   POCTGLUCOSE 104     Urine Studies:   Recent Labs   Lab 11/20/24  0820   COLORU Yellow   APPEARANCEUA Hazy*   PHUR 6.0   SPECGRAV 1.010   PROTEINUA Negative   GLUCUA Negative   KETONESU Trace*   BILIRUBINUA 3+*   OCCULTUA 3+*   NITRITE Negative   UROBILINOGEN 2.0-3.0*   LEUKOCYTESUR Trace*   RBCUA >100*   WBCUA 2   BACTERIA Rare     Significant Imaging:   I have reviewed all pertinent imaging results/findings within the past 24 hours.  I have reviewed and interpreted all pertinent imaging results/findings within the past 24 hours.    ABG  Recent Labs   Lab 11/20/24  0640   PH 7.440   PO2 84   PCO2 42.9   HCO3 29.1*   BE 5*     Assessment/Plan:      Pulmonary  * Shortness of breath  - See plan for acute hypoxic respiratory failure    Acute hypoxic respiratory failure  - Patient with Hypoxic Respiratory failure which is Acute.  he is not on home oxygen. Supplemental oxygen was provided and noted- Oxygen Concentration (%):  [6-50] 50  - Signs/symptoms of respiratory failure include- increased work of breathing and respiratory distress. Contributing diagnoses includes - CHF, Obesity Hypoventilation, and Pneumonia Labs and images were reviewed. Patient Has recent ABG, which has been reviewed.   - Increased pulmonary congestion in setting of acute decompensated liver disease, possible aspiration?  - Continue IV abx  - O2 supplementation and wean as able, required bipap on floor but quickly transitioned to vapotherm 20/50 appears to be tolerating, maintain sats >92%  - Continue IV diuresis and monitor UOP  - Echo with normal systolic/diastolic function but   - Fluid restriction, monitor I&Os, daily weights.     Cardiac/Vascular  Hypertension  - Patients blood pressure range in the last 24 hours was: BP  Min: 97/45  Max: 150/70.The patient's inpatient anti-hypertensive regimen is listed below:  - Current Antihypertensives  furosemide injection 60 mg, 2 times daily, Intravenous  spironolactone tablet 100 mg, Daily, Oral  - BP is controlled, no changes needed to their regimen    Endocrine  Class 3 severe obesity due to excess calories with body mass index (BMI) of 50.0 to 59.9 in adult  - Body mass index is 52.89 kg/m². Morbid obesity complicates all aspects of disease management from diagnostic modalities to treatment. Weight loss encouraged and health benefits explained to patient.     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 Co-morbidities are present and inclusive of ascites and hepatic encephalopathy.  MELD-Na score calculated; MELD 3.0: 35 at 11/20/2024  2:34 AM  MELD-Na: 35 at 11/20/2024  2:34 AM  Calculated from:  Serum Creatinine: 1.9 mg/dL at 11/20/2024  2:26 AM  Serum Sodium: 124 mmol/L (Using min of 125 mmol/L) at 11/20/2024  2:26 AM  Total Bilirubin: 24.4 mg/dL at 11/20/2024  2:26 AM  Serum Albumin: 2.2 g/dL at 11/20/2024  2:26 AM  INR(ratio): 1.8 at 11/20/2024  2:34 AM  Age at listing (hypothetical): 39 years  Sex: Male at 11/20/2024  2:34 AM  - Continue chronic meds. Etiology likely ETOH. Will avoid any hepatotoxic meds, and monitor CBC/CMP/INR for synthetic function.   - Decompensation overnight, transferred over to ICU  - Continue lactulose + rifaxamin, vitamin supplementation   - No significant ascites on abdominal US, no indication for para at this time  - Continue IV diuresis  - Monitor coag studies and LFTS  - Hepatology following, agree with transfer to [facility] for tranplant involvement     GERD (gastroesophageal reflux disease)  - Continue protonix     Other  Anasarca sec to Cirrhosis  - Diffuse anasarca 2/2 decompensated liver cirrhosis  - Continue with IV diuresis  - Pending transfer to [facility] for hepatology/transplant involvement        Critical Care Daily Checklist:    A: Awake: RASS Goal/Actual Goal:  N/a  Actual:  N/a   B: Spontaneous Breathing Trial Performed?  N/a   C: SAT & SBT Coordinated?  N/a                      D: Delirium: CAM-ICU Overall CAM-ICU: Negative   E: Early Mobility Performed? Yes   F: Feeding Goal:    Status:     Current Diet Order   Procedures    Diet Minced & Moist (IDDSI Level 5) Standard Tray     Order Specific Question:   Tray type:     Answer:   Standard Tray      AS: Analgesia/Sedation None   T: Thromboembolic Prophylaxis SCDs   H: HOB > 300 Yes   U: Stress Ulcer Prophylaxis (if needed) Protonix   G: Glucose Control Monitor   B: Bowel Function Stool  Occurrence: 0   I: Indwelling Catheter (Lines & De La Cruz) Necessity R midline, PIV, de la cruz   D: De-escalation of Antimicrobials/Pharmacotherapies Broadened to Vanc + Zosyn     Plan for the day/ETD Pending transfer to [facility]     Code Status:  Family/Goals of Care: Full Code       Critical Care Time: 43 minutes  Critical secondary to Patient has a condition that poses threat to life and bodily function: Severe Respiratory Distress     Critical care was time spent personally by me on the following activities: development of treatment plan with patient or surrogate and bedside caregivers, discussions with consultants, evaluation of patient's response to treatment, examination of patient, ordering and performing treatments and interventions, ordering and review of laboratory studies, ordering and review of radiographic studies, pulse oximetry, re-evaluation of patient's condition. This critical care time did not overlap with that of any other provider or involve time for any procedures.    Thank you for your consult. I will follow-up with patient. Please contact us if you have any additional questions.     Vno Pyle PA-C  Critical Care Medicine  [facility] - [facility] ([facility])

## 2024-11-20 NOTE — CONSULTS
Pharmacokinetic Initial Assessment: IV Vancomycin    Assessment/Plan:  Initiate intravenous vancomycin with a loading dose of 2000 mg (capped) once with subsequent doses when random concentrations are less than 20 mcg/mL  Desired empiric serum trough concentration is 15 to 20 mcg/mL  Draw vancomycin random level on 11/21 at 0430 with AM labs (~18-hr level).  Pharmacy will continue to follow and monitor vancomycin.      Please contact pharmacy at extension [phone] ([place]) with any questions regarding this assessment.     Thank you for the consult,   Katherine McArdle, Pharm.D. 11/20/2024 11:42 AM         Patient brief summary:  Marcellus Mendoza is a 39 y.o. male initiated on antimicrobial therapy with IV Vancomycin for treatment of suspected lower respiratory infection -- possible aspiration     Drug Allergies:   Review of patient's allergies indicates:  No Known Allergies    Actual Body Weight:   167.2 kg    Renal Function:   Estimated Creatinine Clearance: 83.3 mL/min (A) (based on SCr of 1.9 mg/dL (H)). -- trending up (JOHAN)    Dialysis Method (if applicable):  N/A    CBC (last 72 hours):  Recent Labs   Lab Result Units 11/17/24 2143 11/18/24 0616 11/19/24  0454 11/20/24  0226   WBC K/uL 8.64 8.56 7.29 9.38   Hemoglobin g/dL 10.1* 9.6* 9.1* 9.3*   Hematocrit % 28.2* 26.5* 25.8* 26.9*   Platelets K/uL 131* 126* 116* 110*   Gran % % 78.8* 76.7* 70.7 65.7   Lymph % % 4.4* 5.6* 6.9* 8.0*   Mono % % 14.1 14.3 18.0* 19.1*   Eosinophil % % 1.0 1.5 2.3 3.2   Basophil % % 0.2 0.4 0.3 0.5   Differential Method  Automated Automated Automated Automated       Metabolic Panel (last 72 hours):  Recent Labs   Lab Result Units 11/17/24 2143 11/18/24  0616 11/19/24  0454 11/20/24  0226 11/20/24  0820 11/20/24  0821 11/20/24  1038   Sodium mmol/L 122* 122* 123* 124*  --   --   --    Sodium, Urine mmol/L  --   --   --   --   --  <20*  --    Potassium mmol/L 3.1* 3.2* 3.0* 2.9*  --   --  3.5   Potassium, Urine mmol/L  --    --   --   --   --  30  --    Chloride mmol/L 83* 83* 83* 83*  --   --   --    CO2 mmol/L 27 25 26 25  --   --   --    Glucose mg/dL 120* 108 100 103  --   --   --    Glucose, UA   --   --   --   --  Negative  --   --    BUN mg/dL 12 13 19 23*  --   --   --    Creatinine mg/dL 1.2 1.2 1.5* 1.9*  --   --   --    Albumin g/dL 2.1* 2.0* 2.0* 2.2*  --   --   --    Total Bilirubin mg/dL 21.5* 21.3* 23.8* 24.4*  --   --   --    Alkaline Phosphatase U/L 227* 212* 215* 216*  --   --   --    AST U/L 158* 155* 158* 154*  --   --   --    ALT U/L 55* 59* 62* 64*  --   --   --    Magnesium mg/dL  --  1.6  --  1.8  --   --   --    Phosphorus mg/dL  --   --   --  2.3*  --   --   --        Microbiologic Results:  Microbiology Results (last 7 days)       Procedure Component Value Units Date/Time    Blood culture [[id number]] Collected: 11/20/24 1038    Order Status: Sent Specimen: Blood from Peripheral, Hand, Right     Blood culture [[id number]] Collected: 11/20/24 1038    Order Status: Sent Specimen: Blood from Peripheral, Hand, Left

## 2024-11-20 NOTE — CONSULTS
Nephrology Consultation  Consulting Physician:  Dr Flor  Reason for consultation:  JOHAN / HRS  Informants: staff, family      History of Present Illness     Marcellus Mendoza   is a 39 y.o. male with a hx of primary biliary cirrhosis, ongoing alcohol use, obesity, GERD , presents to the hospital with progressive worsening peripheral edema and shortness of breath.  His serum creatinine increased from 1.2 on admission to 1.5 mg/dL.  Patient with significant hyponatremia with a serum sodium of 123.  Urine sodium less than 20, consistent with hepatorenal syndrome.  We are consulted for same.  He was evaluated by hepatology and suggested transfer to [facility] for evaluation of liver transplantation.      PMHx:    Past Medical History:   Diagnosis Date    Alcohol abuse     Anasarca     GERD (gastroesophageal reflux disease)     Hypertension     Obesity, unspecified     Unspecified cirrhosis of liver     Vitamin D deficiency          PSHx:  Past Surgical History:   Procedure Laterality Date    ORIF, FRACTURE, ANKLE, BIMALLEOLAR Right          SocHx:    Social History     Socioeconomic History    Marital status: Unknown   Tobacco Use    Smoking status: Former     Current packs/day: 0.00     Types: Cigarettes     Quit date: 2023     Years since quittin.3    Smokeless tobacco: Never   Substance and Sexual Activity    Alcohol use: Not Currently     Alcohol/week: 5.0 standard drinks of alcohol     Types: 5 Drinks containing 0.5 oz of alcohol per week     Comment: reports last drink     Drug use: Not Currently    Sexual activity: Yes     Social Drivers of Health     Financial Resource Strain: Patient Declined (2024)    Overall Financial Resource Strain (CARDIA)     Difficulty of Paying Living Expenses: Patient declined   Food Insecurity: Patient Declined (2024)    Hunger Vital Sign     Worried About Running Out of Food in the Last Year: Patient declined     Ran Out of Food in the Last Year:  Patient declined   Transportation Needs: Patient Declined (11/17/2024)    TRANSPORTATION NEEDS     Transportation : Patient declined   Physical Activity: Inactive (4/9/2024)    Received from [facility]    Exercise Vital Sign     Days of Exercise per Week: 0 days     Minutes of Exercise per Session: 0 min   Stress: Patient Declined (11/17/2024)    [employer] - Occupational Stress Questionnaire     Feeling of Stress : Patient declined   Housing Stability: Patient Declined (11/17/2024)    Housing Stability Vital Sign     Unable to Pay for Housing in the Last Year: Patient declined     Homeless in the Last Year: Patient declined         FamHx:    Family History   Problem Relation Name Age of Onset    Breast cancer Mother      Diabetes Father           ROS:      Review of Systems   Constitutional:  Positive for malaise/fatigue. Negative for chills, fever and weight loss.   HENT:  Negative for ear pain, hearing loss and tinnitus.    Eyes:  Negative for blurred vision and double vision.   Respiratory:  Positive for shortness of breath. Negative for cough and hemoptysis.    Cardiovascular:  Positive for leg swelling. Negative for chest pain, palpitations and orthopnea.   Gastrointestinal:  Negative for heartburn, nausea and vomiting.   Genitourinary:  Negative for dysuria, frequency and urgency.   Musculoskeletal:  Negative for myalgias.   Neurological:  Negative for dizziness and headaches.   Psychiatric/Behavioral:  Negative for depression and suicidal ideas.         Allergies:    has No Known Allergies.    Current medications:   Scheduled Meds:   cholecalciferol (vitamin D3)  5,000 Units Oral Daily    folic acid-vit B6-vit B12 2.5-25-2 mg  1 tablet Oral Daily    furosemide (LASIX) injection  60 mg Intravenous BID    lactulose  20 g Oral Q6H    midodrine  5 mg Oral TID WM    multivitamin  1 tablet Oral Daily     "mupirocin   Nasal BID    octreotide  100 mcg Subcutaneous Q8H    [START ON 11/21/2024] pantoprazole  40 mg Oral Daily    piperacillin-tazobactam (Zosyn) IV (PEDS and ADULTS) (extended infusion is not appropriate)  4.5 g Intravenous Q8H    spironolactone  100 mg Oral Daily    thiamine  100 mg Oral Daily    ursodioL  600 mg Oral BID     Continuous Infusions:  PRN Meds:.  Current Facility-Administered Medications:     acetaminophen, 650 mg, Oral, Q8H PRN    albuterol-ipratropium, 3 mL, Nebulization, Q6H PRN    aluminum-magnesium hydroxide-simethicone, 30 mL, Oral, QID PRN    dextrose 10%, 12.5 g, Intravenous, PRN    dextrose 10%, 25 g, Intravenous, PRN    glucagon (human recombinant), 1 mg, Intramuscular, PRN    glucose, 16 g, Oral, PRN    glucose, 24 g, Oral, PRN    influenza, 0.5 mL, Intramuscular, Prior to discharge    lorazepam, 2 mg, Intravenous, Q2H PRN    melatonin, 6 mg, Oral, Nightly PRN    naloxone, 0.02 mg, Intravenous, PRN    ondansetron, 4 mg, Intravenous, Q8H PRN    pneumoc 20-stefany conj-dip cr(PF), 0.5 mL, Intramuscular, vaccine x 1 dose    promethazine, 25 mg, Oral, Q6H PRN    senna-docusate 8.6-50 mg, 1 tablet, Oral, BID PRN    sodium chloride 0.9%, 10 mL, Intravenous, Q12H PRN    Pharmacy to dose Vancomycin consult, , , Once **AND** vancomycin - pharmacy to dose, , Intravenous, pharmacy to manage frequency       Physical Examination    VS/Measurements   BP (!) 121/55   Pulse (!) 116   Temp 98.6 °F (37 °C) (Oral)   Resp (!) 40   Ht 5' 10" (1.778 m)   Wt (!) 167.2 kg (368 lb 9.8 oz)   SpO2 (!) 91%   BMI 52.89 kg/m²     Physical Exam  Constitutional:       General: He is not in acute distress.     Appearance: He is obese. He is ill-appearing.   HENT:      Head: Atraumatic.   Eyes:      General: Scleral icterus present.   Cardiovascular:      Rate and Rhythm: Tachycardia present.      Heart sounds: No murmur heard.     No friction rub.   Pulmonary:      Breath sounds: Rales present. No wheezing or " rhonchi.   Abdominal:      General: There is no distension.      Palpations: There is no mass.      Tenderness: There is no abdominal tenderness. There is no guarding.   Musculoskeletal:         General: Swelling present. No tenderness.   Skin:     Coloration: Skin is jaundiced.      Findings: No lesion.   Neurological:      Motor: No weakness.      Gait: Gait normal.              Laboratory Results   Today's Lab Results :    Recent Results (from the past 24 hours)   Comprehensive metabolic panel    Collection Time: 11/20/24  2:26 AM   Result Value Ref Range    Sodium 124 (L) 136 - 145 mmol/L    Potassium 2.9 (L) 3.5 - 5.1 mmol/L    Chloride 83 (L) 95 - 110 mmol/L    CO2 25 23 - 29 mmol/L    Glucose 103 70 - 110 mg/dL    BUN 23 (H) 6 - 20 mg/dL    Creatinine 1.9 (H) 0.5 - 1.4 mg/dL    Calcium 8.6 (L) 8.7 - 10.5 mg/dL    Total Protein 6.7 6.0 - 8.4 g/dL    Albumin 2.2 (L) 3.5 - 5.2 g/dL    Total Bilirubin 24.4 (H) 0.1 - 1.0 mg/dL    Alkaline Phosphatase 216 (H) 40 - 150 U/L     (H) 10 - 40 U/L    ALT 64 (H) 10 - 44 U/L    eGFR 46 (A) >60 mL/min/1.73 m^2    Anion Gap 16 8 - 16 mmol/L   Ammonia    Collection Time: 11/20/24  2:26 AM   Result Value Ref Range    Ammonia 57 (H) 10 - 50 umol/L   CBC Auto Differential    Collection Time: 11/20/24  2:26 AM   Result Value Ref Range    WBC 9.38 3.90 - 12.70 K/uL    RBC 2.91 (L) 4.60 - 6.20 M/uL    Hemoglobin 9.3 (L) 14.0 - 18.0 g/dL    Hematocrit 26.9 (L) 40.0 - 54.0 %    MCV 92 82 - 98 fL    MCH 32.0 (H) 27.0 - 31.0 pg    MCHC 34.6 32.0 - 36.0 g/dL    RDW 25.4 (H) 11.5 - 14.5 %    Platelets 110 (L) 150 - 450 K/uL    MPV 11.8 9.2 - 12.9 fL    Immature Granulocytes 3.5 (H) 0.0 - 0.5 %    Gran # (ANC) 6.2 1.8 - 7.7 K/uL    Immature Grans (Abs) 0.33 (H) 0.00 - 0.04 K/uL    Lymph # 0.8 (L) 1.0 - 4.8 K/uL    Mono # 1.8 (H) 0.3 - 1.0 K/uL    Eos # 0.3 0.0 - 0.5 K/uL    Baso # 0.05 0.00 - 0.20 K/uL    nRBC 1 (A) 0 /100 WBC    Gran % 65.7 38.0 - 73.0 %    Lymph % 8.0 (L) 18.0 -  48.0 %    Mono % 19.1 (H) 4.0 - 15.0 %    Eosinophil % 3.2 0.0 - 8.0 %    Basophil % 0.5 0.0 - 1.9 %    Differential Method Automated    Lactic Acid, Plasma    Collection Time: 11/20/24  2:26 AM   Result Value Ref Range    Lactate (Lactic Acid) 1.0 0.5 - 2.2 mmol/L   Magnesium    Collection Time: 11/20/24  2:26 AM   Result Value Ref Range    Magnesium 1.8 1.6 - 2.6 mg/dL   CK    Collection Time: 11/20/24  2:26 AM   Result Value Ref Range     (H) 20 - 200 U/L   Phosphorus    Collection Time: 11/20/24  2:26 AM   Result Value Ref Range    Phosphorus 2.3 (L) 2.7 - 4.5 mg/dL   Protime-INR    Collection Time: 11/20/24  2:34 AM   Result Value Ref Range    Prothrombin Time 20.0 (H) 9.0 - 12.5 sec    INR 1.8 (H) 0.8 - 1.2   ISTAT PROCEDURE    Collection Time: 11/20/24  6:40 AM   Result Value Ref Range    POC PH 7.440 7.35 - 7.45    POC PCO2 42.9 35 - 45 mmHg    POC PO2 84 80 - 100 mmHg    POC HCO3 29.1 (H) 24 - 28 mmol/L    POC BE 5 (H) -2 to 2 mmol/L    POC SATURATED O2 97 95 - 100 %    Sample ARTERIAL     Site LR     Allens Test Pass     DelSys Venti Mask     Mode SPONT     Flow 15     FiO2 50    Urinalysis, Reflex to Urine Culture Urine, Catheterized    Collection Time: 11/20/24  8:20 AM    Specimen: Urine   Result Value Ref Range    Specimen UA Urine, Catheterized     Color, UA Yellow Yellow, Straw, Emmie    Appearance, UA Hazy (A) Clear    pH, UA 6.0 5.0 - 8.0    Specific Gravity, UA 1.010 1.005 - 1.030    Protein, UA Negative Negative    Glucose, UA Negative Negative    Ketones, UA Trace (A) Negative    Bilirubin (UA) 3+ (A) Negative    Occult Blood UA 3+ (A) Negative    Nitrite, UA Negative Negative    Urobilinogen, UA 2.0-3.0 (A) <2.0 EU/dL    Leukocytes, UA Trace (A) Negative   Urinalysis Microscopic    Collection Time: 11/20/24  8:20 AM   Result Value Ref Range    RBC, UA >100 (H) 0 - 4 /hpf    WBC, UA 2 0 - 5 /hpf    Bacteria Rare None-Occ /hpf    Unclass [place] UA Occasional None-Moderate    Microscopic  Comment SEE COMMENT    Sodium, urine, random    Collection Time: 11/20/24  8:21 AM   Result Value Ref Range    Sodium, Urine <20 (A) 20 - 250 mmol/L   Potassium, urine, random    Collection Time: 11/20/24  8:21 AM   Result Value Ref Range    Potassium, Urine 30 15 - 95 mmol/L   Potassium    Collection Time: 11/20/24 10:38 AM   Result Value Ref Range    Potassium 3.5 3.5 - 5.1 mmol/L        Lab Results   Component Value Date    CALCIUM 8.6 (L) 11/20/2024    PHOS 2.3 (L) 11/20/2024     Lab Results   Component Value Date    ALT 64 (H) 11/20/2024     (H) 11/20/2024    ALKPHOS 216 (H) 11/20/2024    BILITOT 24.4 (H) 11/20/2024       Assessment and Plan     JOHAN  - hepatorenal syndrome high in differential diagnosis.  - patient started on midodrine,  will add octreotide.  - patient being diuresed due to significant peripheral edema and shortness of breath, that also limits IV albumin use,  - no ascites noted on abdominal ultrasound.  Currently on broad-spectrum antibiotics.    Acute on chronic hepatic failure  - poor prognosis.  GI notes reviewed.  Being transferred to [place] for higher level of care and possible evaluation of a liver transplantation    Hyponatremia.  Due to ESLD.  Portends a poor prognosis.    Hypokalemia.  Received potassium supplements.    Anemia.  Multifactorial.  Monitor hemoglobin.  PRBC transfusion when indicated.    Plan discussed with patient, family, critical care team        ________________________________________________  Darshan Molina

## 2024-11-20 NOTE — ASSESSMENT & PLAN NOTE
- Ammonia level has trended down  - Continue lactulose + Rifixamin  - Mentation has significantly improved throughout morning

## 2024-11-20 NOTE — ASSESSMENT & PLAN NOTE
- Patients blood pressure range in the last 24 hours was: BP  Min: 97/45  Max: 150/70.The patient's inpatient anti-hypertensive regimen is listed below:  - Current Antihypertensives  furosemide injection 60 mg, 2 times daily, Intravenous  spironolactone tablet 100 mg, Daily, Oral  - BP is controlled, no changes needed to their regimen

## 2024-11-20 NOTE — SUBJECTIVE & OBJECTIVE
Past Medical History:   Diagnosis Date    Alcohol abuse     Anasarca     GERD (gastroesophageal reflux disease)     Hypertension     Obesity, unspecified     Unspecified cirrhosis of liver     Vitamin D deficiency      Past Surgical History:   Procedure Laterality Date    ORIF, FRACTURE, ANKLE, BIMALLEOLAR Right      Review of patient's allergies indicates:  No Known Allergies    Family History       Problem Relation (Age of Onset)    Breast cancer Mother    Diabetes Father          Tobacco Use    Smoking status: Former     Current packs/day: 0.00     Types: Cigarettes     Quit date: 2023     Years since quittin.3    Smokeless tobacco: Never   Substance and Sexual Activity    Alcohol use: Not Currently     Alcohol/week: 5.0 standard drinks of alcohol     Types: 5 Drinks containing 0.5 oz of alcohol per week     Comment: reports last drink     Drug use: Not Currently    Sexual activity: Yes       Review of Systems   Constitutional:  Negative for chills, fatigue and fever.   Respiratory:  Positive for shortness of breath. Negative for cough and wheezing.    Cardiovascular:  Positive for leg swelling.   Gastrointestinal:  Positive for abdominal distention and abdominal pain. Negative for nausea and vomiting.   Genitourinary:  Positive for dysuria.   Musculoskeletal:  Negative for back pain.   Neurological:  Negative for dizziness.   Psychiatric/Behavioral:  Positive for confusion. Negative for agitation.      Objective:     Vital Signs (Most Recent):  Temp: 98.6 °F (37 °C) (24 1102)  Pulse: (!) 115 (24 1200)  Resp: (!) 37 (24 1200)  BP: (!) 121/58 (24 1200)  SpO2: (!) 93 % (24 1200) Vital Signs (24h Range):  Temp:  [97.7 °F (36.5 °C)-98.6 °F (37 °C)] 98.6 °F (37 °C)  Pulse:  [108-126] 115  Resp:  [18-42] 37  SpO2:  [89 %-97 %] 93 %  BP: ()/(45-70) 121/58     Weight: (!) 167.2 kg (368 lb 9.8 oz)  Body mass index is 52.89 kg/m².  Intake/Output Summary (Last 24  hours) at 11/20/2024 1237  Last data filed at 11/20/2024 1204  Gross per 24 hour   Intake 1017 ml   Output 2635 ml   Net -1618 ml     Physical Exam  Vitals reviewed.   Constitutional:       Appearance: He is ill-appearing.   HENT:      Mouth/Throat:      Mouth: Mucous membranes are moist.      Comments: Poor dentition   Eyes:      General: Scleral icterus present.   Cardiovascular:      Rate and Rhythm: Regular rhythm. Tachycardia present.   Pulmonary:      Breath sounds: Rales present. No wheezing.      Comments: On Vapotherm 20/50  Conversational dyspnea noted  Decreased breath sounds bilaterally due to body habitus  Abdominal:      General: There is distension.      Palpations: Abdomen is soft.      Tenderness: There is no abdominal tenderness.   Musculoskeletal:      Right lower leg: Edema present.      Left lower leg: Edema present.   Skin:     Coloration: Skin is jaundiced.   Neurological:      General: No focal deficit present.      Mental Status: He is oriented to person, place, and time.     Vents:  Oxygen Concentration (%): 50 (11/20/24 1122)    Lines/Drains/Airways       Drain  Duration                  Urethral Catheter 11/19/24 1847 16 Fr. <1 day              Peripheral Intravenous Line  Duration                  Midline Catheter - Single Lumen 11/20/24 1152 Right cephalic vein (lateral side of arm) 20g x 8cm <1 day         Peripheral IV - Single Lumen 11/20/24 0420 20 G Anterior;Right Forearm <1 day                  Significant Labs:    CBC/Anemia Profile:  Recent Labs   Lab 11/19/24 0454 11/20/24  0226   WBC 7.29 9.38   HGB 9.1* 9.3*   HCT 25.8* 26.9*   * 110*   MCV 88 92   RDW 24.6* 25.4*     Chemistries:  Recent Labs   Lab 11/19/24  0454 11/20/24  0226 11/20/24  1038   * 124*  --    K 3.0* 2.9* 3.5   CL 83* 83*  --    CO2 26 25  --    BUN 19 23*  --    CREATININE 1.5* 1.9*  --    CALCIUM 8.5* 8.6*  --    ALBUMIN 2.0* 2.2*  --    PROT 6.7 6.7  --    BILITOT 23.8* 24.4*  --    ALKPHOS  215* 216*  --    ALT 62* 64*  --    * 154*  --    MG  --  1.8  --    PHOS  --  2.3*  --      ABGs:   Recent Labs   Lab 11/20/24  0640   PH 7.440   PCO2 42.9   HCO3 29.1*   POCSATURATED 97   BE 5*     Bilirubin:   Recent Labs   Lab 11/17/24  2143 11/18/24  0616 11/19/24  0454 11/20/24  0226   BILIDIR >14.0*  --   --   --    BILITOT 21.5* 21.3* 23.8* 24.4*     Coagulation:   Recent Labs   Lab 11/20/24  0234   INR 1.8*     Lactic Acid:   Recent Labs   Lab 11/19/24  0454 11/19/24  0808 11/20/24  0226   LACTATE 1.3 1.4 1.0     POCT Glucose:   Recent Labs   Lab 11/19/24  0133   POCTGLUCOSE 104     Urine Studies:   Recent Labs   Lab 11/20/24  0820   COLORU Yellow   APPEARANCEUA Hazy*   PHUR 6.0   SPECGRAV 1.010   PROTEINUA Negative   GLUCUA Negative   KETONESU Trace*   BILIRUBINUA 3+*   OCCULTUA 3+*   NITRITE Negative   UROBILINOGEN 2.0-3.0*   LEUKOCYTESUR Trace*   RBCUA >100*   WBCUA 2   BACTERIA Rare     Significant Imaging:   I have reviewed all pertinent imaging results/findings within the past 24 hours.  I have reviewed and interpreted all pertinent imaging results/findings within the past 24 hours.

## 2024-11-20 NOTE — NURSING
Pt pulse ox decreased to 86% on 5L NC, Placed on 6L venti mask and now 90%.    HM contacted. MD to bedside. Stat CXR, ABG, and duoneb ordered.     Respiratory notified and to bedside. Pt placed on 15L venti mask O2 95%.    Transfer to ICU placed.

## 2024-11-20 NOTE — NURSING
Pt became disoriented and combative, unable to reorient/direct patient. Dad and step-mother at bedside and unable to redirect patient.     HM notified. Nonviolent restraints, stat labs ordered, and meds ordered.

## 2024-11-21 PROBLEM — F10.931 ALCOHOL WITHDRAWAL SYNDROME, WITH DELIRIUM: Status: ACTIVE | Noted: 2024-01-01

## 2024-11-21 PROBLEM — N17.9 AKI (ACUTE KIDNEY INJURY): Status: ACTIVE | Noted: 2024-01-01

## 2024-11-21 PROBLEM — Z99.11 ON MECHANICALLY ASSISTED VENTILATION: Status: ACTIVE | Noted: 2024-01-01

## 2024-11-21 PROBLEM — K21.9 GERD (GASTROESOPHAGEAL REFLUX DISEASE): Chronic | Status: RESOLVED | Noted: 2024-01-01 | Resolved: 2024-01-01

## 2024-11-21 PROBLEM — Z51.5 PALLIATIVE CARE ENCOUNTER: Status: ACTIVE | Noted: 2024-01-01

## 2024-11-21 NOTE — ASSESSMENT & PLAN NOTE
Patient with Hypoxic Respiratory failure which is Acute.  he is not on home oxygen. Supplemental oxygen was provided and noted- Oxygen Concentration (%):  [6-60] 60    .   Signs/symptoms of respiratory failure include- respiratory distress, lethargy, and cyanosis. Contributing diagnoses includes - Aspiration Labs and images were reviewed. Patient Has recent ABG, which has been reviewed. Will treat underlying causes and adjust management of respiratory failure as follows:    Patient was stable on Vapotherm at OSH with 50% FiO2; thought to be 2/2 aspiration pneumonia. CXR with concern for pulmonary edema. Echo 11/18 with normal diastolic function. Likely vascular congestion 2/2 portal hypertension. Intubated for airway protection.    --Vent bundle in place  --Daily SAT/SBT  --Wean FiO2 for SpO2>92%  --Diurese  --Broad-spectrum antibiotics  --Sputum culture  --RIP

## 2024-11-21 NOTE — PROCEDURES
"Marcellus Mendoza is a 39 y.o. male patient.    Temp: 99.1 °F (37.3 °C) (11/20/24 2305)  Pulse: 100 (11/21/24 0035)  Resp: (!) 25 (11/21/24 0035)  BP: (!) 99/51 (11/21/24 0035)  SpO2: 95 % (11/21/24 0035)  Weight: (!) 167.2 kg (368 lb 9.8 oz) (11/20/24 0745)  Height: 5' 10" (177.8 cm) (11/17/24 9718)       Intubation    Date/Time: 11/21/2024 1:20 AM  Location procedure was performed: [facility] INTENSIVE CARE UNIT    Performed by: Nidhi Islas NP  Authorized by: Nidhi Islas NP  Pre-operative diagnosis: hepatic encephalopathy  Post-operative diagnosis: hepatic encephalopathy  Consent Done: Emergent Situation  Indications: respiratory failure and airway protection  Intubation method: video-assisted  Patient status: sedated  Preoxygenation: bag valve mask (with vapotherm 40L/100%)  Sedatives: etomidate  Paralytic: rocuronium  Laryngoscope size: Glide 3  Tube size: 8.0 mm  Tube type: cuffed  Number of attempts: 1  Cricoid pressure: no  Cords visualized: yes  Post-procedure assessment: chest rise and CO2 detector  Breath sounds: equal and absent over the epigastrium  Cuff inflated: yes  ETT to lip: 25 cm  Tube secured with: ETT vail  Chest x-ray interpreted by me.  Chest x-ray findings: endotracheal tube in appropriate position  Patient tolerance: Patient tolerated the procedure well with no immediate complications        Nidhi Islas [facility]-  Critical Care Medicine  [facility]    11/21/2024    "

## 2024-11-21 NOTE — HOSPITAL COURSE
40 y/o man with acute liver failure and active alcohol use comes to the ICU from the floor this morning for increasing oxygen requirement overnight and concern for aspiration. Reported worsening swallowing ability w/increased confusion, which seems to be more significant at night. He is more comfortable on Vapotherm 20L @ 60%, though remains tachypneic. Tbili uptrending, Cr uptrending despite decent UOP. Broadened Abx to Vanc/Zosyn given c/f aspiration. Blood Cx pending. PFC updatedf on status change, patient has been accepted for transfer to MICU at [facility] for Hepatology and critical care. Plans are to transport on NIV support. Awaiting transport service.

## 2024-11-21 NOTE — HPI
Mr. Mendoza is a 39-year-old male with past medical history of primary biliary cholangitis with cirrhosis, ETOH abuse, anasarca, GERD, HTN, morbid obesity, vitamin D deficiency who initially presented to [facility] with dyspnea and concern for acute heart failure exacerbation and decompensated cirrhosis. He was transferred to [facility] for higher level of care on 11/17 and now to [facility] for liver transplant evaluation. While at [facility], noted to be in ETOH withdrawal complicated by respiratory failure 2/2 aspiration. He was stable on Vapotherm but with worsening mental status- unable to tolerate bipap with concern for airway. He was intubated for airway protection prior to transport to [facility].      Transferred to [facility] 11/21 for liver transplant evaluation with decompensated cirrhosis, heart failure with subsequent respiratory failure and ETOH withdrawal.

## 2024-11-21 NOTE — SUBJECTIVE & OBJECTIVE
Past Medical History:   Diagnosis Date    Alcohol abuse     Anasarca     GERD (gastroesophageal reflux disease)     Hypertension     Obesity, unspecified     Unspecified cirrhosis of liver     Vitamin D deficiency        Past Surgical History:   Procedure Laterality Date    ORIF, FRACTURE, ANKLE, BIMALLEOLAR Right        Review of patient's allergies indicates:  No Known Allergies    Family History       Problem Relation (Age of Onset)    Breast cancer Mother    Diabetes Father          Tobacco Use    Smoking status: Former     Current packs/day: 0.00     Types: Cigarettes     Quit date: 2023     Years since quittin.3    Smokeless tobacco: Never   Substance and Sexual Activity    Alcohol use: Not Currently     Alcohol/week: 5.0 standard drinks of alcohol     Types: 5 Drinks containing 0.5 oz of alcohol per week     Comment: reports last drink     Drug use: Not Currently    Sexual activity: Yes      Review of Systems   Unable to perform ROS: Intubated     Objective:     Vital Signs (Most Recent):  Pulse: (!) 119 (24)  Resp: (!) 38 (24)  BP: (!) 120/58 (24)  SpO2: 97 % (24) Vital Signs (24h Range):  Temp:  [98.1 °F (36.7 °C)-99.8 °F (37.7 °C)] 99.1 °F (37.3 °C)  Pulse:  [] 119  Resp:  [12-44] 38  SpO2:  [87 %-99 %] 97 %  BP: ()/(35-83) 120/58   Weight: (!) 167.2 kg (368 lb 9.8 oz)  Body mass index is 52.89 kg/m².    No intake or output data in the 24 hours ending 24       Physical Exam  Constitutional:       Appearance: He is ill-appearing.      Interventions: He is sedated and intubated.   HENT:      Head: Normocephalic and atraumatic.   Cardiovascular:      Rate and Rhythm: Regular rhythm. Tachycardia present.      Heart sounds: S1 normal and S2 normal. Murmur heard.   Pulmonary:      Effort: He is intubated.      Breath sounds: No decreased breath sounds, wheezing, rhonchi or rales.   Abdominal:      General: Abdomen is  protuberant.      Palpations: Abdomen is soft.   Musculoskeletal:      Cervical back: Neck supple.   Skin:     Coloration: Skin is jaundiced.   Neurological:      Comments: Opens eyes; does not follow commands            Vents:  Vent Mode: A/C (11/21/24 0435)  Ventilator Initiated: Yes (11/21/24 0435)  Set Rate: 22 BPM (11/21/24 0435)  Vt Set: 450 mL (11/21/24 0435)  PEEP/CPAP: 5 cmH20 (11/21/24 0435)  Oxygen Concentration (%): 40 (11/21/24 0435)  Peak Airway Pressure: 25 cmH20 (11/21/24 0435)  Plateau Pressure: 0 cmH20 (11/21/24 0435)  Total Ve: 14.2 L/m (11/21/24 0435)  Negative Inspiratory Force (cm H2O): 0 (11/21/24 0435)  F/VT Ratio<105 (RSBI): (!) 89.2 (11/21/24 0435)  Lines/Drains/Airways       Drain  Duration                  Urethral Catheter 11/19/24 1847 16 Fr. 1 day         NG/OG Tube 11/21/24 0130 Dyke sump 16 Fr. Right mouth <1 day              Airway  Duration                  Airway - Non-Surgical 11/21/24 0120 Endotracheal Tube <1 day              Peripheral Intravenous Line  Duration                  Peripheral IV - Single Lumen 11/20/24 0420 20 G Anterior;Right Forearm 1 day         Midline Catheter - Single Lumen 11/20/24 1152 Right cephalic vein (lateral side of arm) 20g x 8cm <1 day                  Significant Labs:    CBC/Anemia Profile:  Recent Labs   Lab 11/20/24 0226 11/20/24  1826   WBC 9.38  --    HGB 9.3*  --    HCT 26.9* 31*   *  --    MCV 92  --    RDW 25.4*  --         Chemistries:  Recent Labs   Lab 11/20/24 0226 11/20/24  1038 11/20/24  1350   *  --  121*   K 2.9* 3.5 3.2*   CL 83*  --  82*   CO2 25  --  24   BUN 23*  --  24*   CREATININE 1.9*  --  2.1*   CALCIUM 8.6*  --  8.3*   ALBUMIN 2.2*  --  2.2*   PROT 6.7  --   --    BILITOT 24.4*  --   --    ALKPHOS 216*  --   --    ALT 64*  --   --    *  --   --    MG 1.8  --   --    PHOS 2.3*  --  2.6*  2.6*       All pertinent labs within the past 24 hours have been reviewed.    Significant Imaging: I have  reviewed all pertinent imaging results/findings within the past 24 hours.

## 2024-11-21 NOTE — HPI
Mr. Mendoza is a 39-year-old male with past medical history of primary biliary cholangitis with cirrhosis, ETOH abuse, anasarca, GERD, HTN, morbid obesity, vitamin D deficiency who initially presented to [facility] with dyspnea and concern for acute heart failure exacerbation and decompensated cirrhosis. He was transferred to [facility] for higher level of care on 11/17. There he was started IV lasix + rocephin for SBP prevention with Lactulose + Rifaximin added to regimen. While at [facility], noted to be in ETOH withdrawal complicated by respiratory failure 2/2 aspiration. He was stable on Vapotherm but with worsening mental status- unable to tolerate bipap with concern for airway. He was intubated for airway protection prior [place] [facility] [facility] [place] transplant evaluation with decompensated cirrhosis, heart failure with subsequent respiratory failure and ETOH withdrawal. Nephrology consulted for JOHAN.    In regards to nephrology history, patient with no known hx of CKD. Cr 1.2 on admission to [facility]. Nephrology consulted at [facility] for JOHAN with Cr increasing to 1.5, thought to be secondary to HRS per their consult note yesterday 11/20. Urine Na < 20. [place] [facility] [place] and nephrology consulted for continued evaluation and management of JOHAN.

## 2024-11-21 NOTE — ASSESSMENT & PLAN NOTE
Evaluated by hepatology at [facility]. 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 Transferred here for liver transplant evaluation.  MELD 3.0: 35 at 11/20/2024  1:50 PM  MELD-Na: 35 at 11/20/2024  1:50 PM  Calculated from:  Serum Creatinine: 2.1 mg/dL at 11/20/2024  1:50 PM  Serum Sodium: 121 mmol/L (Using min of 125 mmol/L) at 11/20/2024  1:50 PM  Total Bilirubin: 24.4 mg/dL at 11/20/2024  2:26 AM  Serum Albumin: 2.2 g/dL at 11/20/2024  1:50 PM  INR(ratio): 1.8 at 11/20/2024  2:34 AM  Age at listing (hypothetical): 39 years  Sex: Male at 11/20/2024  1:50 PM     --Consult hepatology; appreciate recs  --Lactulose; rifaximin  --Liver US w/ doppler  --Paracentesis if able  --Trend LFTs  --Avoid hepatotoxic agents

## 2024-11-21 NOTE — PROGRESS NOTES
Pharmacokinetic Assessment Follow Up: IV Vancomycin    Vancomycin Regimen Assessment & Plan  - Vancomycin level drawn with morning labs today resulted as 11.8 mcg/mL, goal trough 10-20 mcg/mL.  - Patient with JOHAN. Will continue pulse dosing by levels.  - Administer vancomycin 1500 mg IV x1 dose today since level is <20. Draw vancomycin level with morning labs tomorrow. Will re-dose as needed depending on level and renal function.    Drug levels (last 3 results):  Recent Labs   Lab Result Units 11/21/24  0910   Vancomycin, Random ug/mL 11.8     Pharmacy will continue to follow and monitor vancomycin.    Please contact pharmacy at extension [phone] for questions regarding this assessment.    Thank you for the consult,   Roney Thibodeaux    Patient brief summary:  Marcellus Mendoza is a 39 y.o. male initiated on antimicrobial therapy with IV Vancomycin for treatment of sepsis    Drug Allergies:   Review of patient's allergies indicates:  No Known Allergies    Actual Body Weight:   167.2 kg    Renal Function:   Estimated Creatinine Clearance: 75.4 mL/min (A) (based on SCr of 2.1 mg/dL (H)).,     Dialysis Method (if applicable):  N/A

## 2024-11-21 NOTE — PROGRESS NOTES
Pt was Transferred to [facility] MICU. Spoke with nurse. He is there for liver transplant evaluation. Pt is on a ventilator at this time.

## 2024-11-21 NOTE — ASSESSMENT & PLAN NOTE
On vasopressor support on arrival; quickly weaned off    --Resume antihypertensives when clinically appropriate

## 2024-11-21 NOTE — CONSULTS
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 male with history of alcohol use disorder, PBC, BMI >50, and cirrhosis who presents as a transfer for Hepatology evaluation in setting of decompensated cirrhosis. He was intubated prior to transfer so history is obtained through chart review and by parents at bedside. Patient initially presented to OSH with complaint of worsening shortness of breath and lower extremity swelling over the past couple weeks. Symptoms were worse with exertion and lying flat. He had difficulty refilling his ursodiol and furosemide so he had not been taking either of those for the last couple of months. Labs at the initial facility revealed T-bili 20, INR 2, and MELD score 30. He was then transferred to [facility] for further management. There he was started IV lasix + rocephin for SBP prevention with Lactulose + Rifaximin added to regimen. Course was complicated by increased work of breathing and worsening mental status, so decision was made to transfer patient to [facility] and admitted to MICU for higher level of care. Patient was intubated for airway protection prior to arrival given worsening mental status and concern for an aspiration event. Hepatology consulted for evaluation.    Liver history:  Presented to ED with jaundice and leg swelling in early 2024 and was subsequently sent to GI for further evaluation. Was seen in Feb 2024 and at that time was 3 weeks  sober and had attended intensive outpatient program for alcohol rehab. Previously was drinking a fifth of schnapps daily per GI documentation. At that time was counseled on complete alcohol cessation. Underwent workup for cirrhosis and had biopsy performed in April that showed cirrhosis with lobular granuloma as well as positive AMA. Was told of PBC diagnosis at that time. Patient was unfortunately lost to follow-up for unclear reasons, but per family, it seems that the hepatologist he was referred to ended up leaving practice so he was never successfully established with hepatology. At the same time, due to insurance issues, he was unable to afford his ursodiol and Lasix prescriptions and ended up being off of both medications for months prior to presentation. [place] [facility] he reportedly admitted to having drank alcohol recently up to his presentation, but family deny any knowledge of him drinking alcohol or reporting alcohol use to them.        Past Medical History:   Diagnosis Date    Alcohol abuse     Anasarca     GERD (gastroesophageal reflux disease)     Hypertension     Obesity, unspecified     Unspecified cirrhosis of liver     Vitamin D deficiency        Past Surgical History:   Procedure Laterality Date    ORIF, FRACTURE, ANKLE, BIMALLEOLAR Right        Family History   Problem Relation Name Age of Onset    Breast cancer Mother      Diabetes Father         Social History     Socioeconomic History    Marital status: Unknown   Tobacco Use    Smoking status: Former     Current packs/day: 0.00     Types: Cigarettes     Quit date: 2023     Years since quittin.3    Smokeless tobacco: Never   Substance and Sexual Activity    Alcohol use: Not Currently     Alcohol/week: 5.0 standard drinks of alcohol     Types: 5 Drinks containing 0.5 oz of alcohol per week     Comment: reports last drink     Drug use: Not Currently    Sexual activity: Yes     Social Drivers of Health     Financial Resource Strain:  Patient Declined (11/17/2024)    Overall Financial Resource Strain (CARDIA)     Difficulty of Paying Living Expenses: Patient declined   Food Insecurity: Patient Declined (11/17/2024)    Hunger Vital Sign     Worried About Running Out of Food in the Last Year: Patient declined     Ran Out of Food in the Last Year: Patient declined   Transportation Needs: Patient Declined (11/17/2024)    TRANSPORTATION NEEDS     Transportation : Patient declined   Physical Activity: Inactive (4/9/2024)    Received from [facility]    Exercise Vital Sign     Days of Exercise per Week: 0 days     Minutes of Exercise per Session: 0 min   Stress: Patient Declined (11/17/2024)    [employer] - Occupational Stress Questionnaire     Feeling of Stress : Patient declined   Housing Stability: Patient Declined (11/17/2024)    Housing Stability Vital Sign     Unable to Pay for Housing in the Last Year: Patient declined     Homeless in the Last Year: Patient declined       Current Facility-Administered Medications on File Prior to Encounter   Medication Dose Route Frequency Provider Last Rate Last Admin    [COMPLETED] etomidate injection 30 mg  30 mg Intravenous Once Nidhi Islas NP   30 mg at 11/21/24 0110    [COMPLETED] potassium chloride SA CR tablet 40 mEq  40 mEq Oral Once Von Pyle PA-C   40 mEq at 11/20/24 1507    [COMPLETED] rocuronium 10 mg/mL injection             [COMPLETED] rocuronium injection 167 mg  1 mg/kg Intravenous Once Nidhi Islas NP        [COMPLETED] vancomycin 2 g in dextrose 5 % 500 mL IVPB  2,000 mg Intravenous Once Saturnino Flor MD   Stopped at 11/20/24 1300    [DISCONTINUED] acetaminophen tablet 650 mg  650 mg Oral Q8H PRN Montrell Allan MD   650 mg at 11/18/24 1117    [DISCONTINUED] albuterol-ipratropium 2.5 mg-0.5 mg/3 mL nebulizer solution 3 mL  3 mL Nebulization Q6H PRN Sanjana  Montrell PARKER MD        [DISCONTINUED] aluminum-magnesium hydroxide-simethicone 200-200-20 mg/5 mL suspension 30 mL  30 mL Oral QID PRN Montrell Allan MD        [DISCONTINUED] cholecalciferol (vitamin D3) 125 mcg (5,000 unit) tablet 5,000 Units  5,000 Units Oral Daily Irma Napier MD   5,000 Units at 11/20/24 1152    [DISCONTINUED] dexmedetomidine (PRECEDEX) 400mcg/100mL dextrose 5% infusion  0-1.4 mcg/kg/hr Intravenous Continuous Nidhi Islas NP 8.36 mL/hr at 11/21/24 0200 0.2 mcg/kg/hr at 11/21/24 0200    [DISCONTINUED] dextrose 10% bolus 125 mL 125 mL  12.5 g Intravenous PRN Montrell Allan MD        [DISCONTINUED] dextrose 10% bolus 250 mL 250 mL  25 g Intravenous PRN Montrell Allan MD        [DISCONTINUED] fentaNYL 2500 mcg in 0.9% sodium chloride 250 mL infusion premix  0-250 mcg/hr Intravenous Continuous Nidhi Islas NP 2.5 mL/hr at 11/21/24 0200 25 mcg/hr at 11/21/24 0200    [DISCONTINUED] folic acid-vit B6-vit B12 2.5-25-2 mg tablet 1 tablet  1 tablet Oral Daily Irma Napier MD   1 tablet at 11/20/24 1153    [DISCONTINUED] furosemide injection 60 mg  60 mg Intravenous BID Irma Napier MD   60 mg at 11/20/24 2130    [DISCONTINUED] glucagon (human recombinant) injection 1 mg  1 mg Intramuscular PRN Montrell Allan MD        [DISCONTINUED] glucose chewable tablet 16 g  16 g Oral PRN Montrell Allan MD        [DISCONTINUED] glucose chewable tablet 24 g  24 g Oral PRN Montrell Allan MD        [DISCONTINUED] influenza (Flulaval, Fluzone, Fluarix) 45 mcg/0.5 mL IM vaccine (> or = 6 mo) 0.5 mL  0.5 mL Intramuscular Prior to discharge Saturnino Flor MD        [DISCONTINUED] lactulose 20 gram/30 mL solution Soln 20 g  20 g Oral Q6H Leanne Valenzuela MD   20 g at 11/20/24 1753    [DISCONTINUED] LORazepam (ATIVAN) 2 mg/mL injection             [DISCONTINUED] LORazepam (ATIVAN) injection 2 mg  2 mg Intravenous Q2H PRN Leanne Valenzuela MD   2 mg at 11/20/24 4370     [DISCONTINUED] LORazepam injection 2 mg  2 mg Intravenous Q2H PRN Leanne Valenzuela MD   2 mg at 11/20/24 2325    [DISCONTINUED] melatonin tablet 6 mg  6 mg Oral Nightly PRN Montrell Allan MD   6 mg at 11/19/24 2052    [DISCONTINUED] midodrine tablet 5 mg  5 mg Oral TID WM Leanne Valenzuela MD   5 mg at 11/20/24 1753    [DISCONTINUED] multivitamin tablet  1 tablet Oral Daily Irma Napier MD   1 tablet at 11/20/24 1155    [DISCONTINUED] mupirocin 2 % ointment   Nasal BID Irma Napier MD   Given at 11/20/24 2148    [DISCONTINUED] naloxone 0.4 mg/mL injection 0.02 mg  0.02 mg Intravenous PRN Montrell Allan MD        [DISCONTINUED] NORepinephrine 4 mg in sodium chloride 0.9% 250 mL infusion (premix)  0-0.2 mcg/kg/min Intravenous Continuous Nidhi Islas NP 18.8 mL/hr at 11/21/24 0200 0.03 mcg/kg/min at 11/21/24 0200    [DISCONTINUED] octreotide injection 100 mcg  100 mcg Subcutaneous Q8H Darshan Molina MD   100 mcg at 11/20/24 2255    [DISCONTINUED] ondansetron injection 4 mg  4 mg Intravenous Q8H PRN Montrell Allan MD        [DISCONTINUED] pantoprazole EC tablet 40 mg  40 mg Oral Daily Saturnino Flor MD        [DISCONTINUED] pantoprazole injection 40 mg  40 mg Intravenous Daily Montrell Allan MD   40 mg at 11/20/24 0832    [DISCONTINUED] piperacillin-tazobactam (ZOSYN) 4.5 g in D5W 100 mL IVPB (MB+)  4.5 g Intravenous Q8H Saturnino Flor MD   Stopped at 11/20/24 2157    [DISCONTINUED] pneumoc 20-stefany conj-dip cr(PF) (PREVNAR-20 (PF)) injection Syrg 0.5 mL  0.5 mL Intramuscular vaccine x 1 dose Saturnino Flor MD        [DISCONTINUED] promethazine tablet 25 mg  25 mg Oral Q6H PRN Montrell Allan MD        [DISCONTINUED] senna-docusate 8.6-50 mg per tablet 1 tablet  1 tablet Oral BID PRN Montrell Allan MD        [DISCONTINUED] sodium chloride 0.9% flush 10 mL  10 mL Intravenous Q12H PRN Montrell Allan MD   10 mL at 11/20/24 0341    [DISCONTINUED]  spironolactone tablet 100 mg  100 mg Oral Daily Harmeet Macias MD   100 mg at 11/20/24 1154    [DISCONTINUED] thiamine tablet 100 mg  100 mg Oral Daily Irma Napier MD   100 mg at 11/20/24 1155    [DISCONTINUED] ursodioL capsule 600 mg  600 mg Oral BID Hrameet Macias MD   600 mg at 11/20/24 2116    [DISCONTINUED] vancomycin - pharmacy to dose   Intravenous pharmacy to manage frequency Saturnino Flor MD         No current outpatient medications on file prior to encounter.       Review of patient's allergies indicates:  No Known Allergies    Review of Systems   Unable to perform ROS: Intubated        Objective:     Vitals:    11/21/24 1000   BP: (!) 99/46   Pulse: 89   Resp: (!) 24   Temp:          Constitutional: Ill-appearing. Sedated and intubated.  HENT: Normal, atraumatic  Eyes: Scleral icterus.  Cardiovascular: Normal rate, regular rhythm, no murmurs  Respiratory: On mechanical ventilation  GI: Distended abdomen  Musculoskeletal: No muscle wasting. Edematous  Skin: No lesions. Jaundiced  Neurological: Sedated  Psychiatric: Unable to assess    Significant Labs:  Recent Labs   Lab 11/19/24  0454 11/20/24  0226 11/21/24  0910   HGB 9.1* 9.3* 8.9*       Lab Results   Component Value Date    WBC 10.77 11/21/2024    HGB 8.9 (L) 11/21/2024    HCT 24.4 (L) 11/21/2024    MCV 91 11/21/2024     (L) 11/20/2024       Lab Results   Component Value Date     (L) 11/20/2024    K 3.2 (L) 11/20/2024    CL 82 (L) 11/20/2024    CO2 24 11/20/2024    BUN 24 (H) 11/20/2024    CREATININE 2.1 (H) 11/20/2024    CALCIUM 8.3 (L) 11/20/2024    ANIONGAP 15 11/20/2024    ESTGFRAFRICA 115 03/08/2024       Lab Results   Component Value Date    ALT 64 (H) 11/20/2024     (H) 11/20/2024    ALKPHOS 216 (H) 11/20/2024    BILITOT 24.4 (H) 11/20/2024       Lab Results   Component Value Date    INR 1.9 (H) 11/21/2024    INR 1.8 (H) 11/20/2024    INR 1.9 (H) 11/17/2024       Significant Imaging:  Reviewed  pertinent radiology findings.       Assessment/Plan:     Marcellus Mendoza is a 39 y.o. male with history of alcohol use disorder, alcoholic cirrhosis, PBC and severe obesity who presented initially with anasarca and SOB and was admitted for decompensated cirrhosis. Course has been complicated by worsening mental and respiratory status, now intubated. Transferred to [facility] for [place] transplant evaluation.    Appears that patient was lost to follow-up for some reasons out of his immediate control; was unable to establish with hepatologist and unable to afford/refill his prescriptions. Reportedly, he admitted to Hepatology at [facility] that he was recently drinking again prior to presentation, though family were not aware if he may have restarted drinking. His current pattern of AST:ALT derangement is suggestive of alcohol related injury. That said, patient is unfortunately unable to participate in discussion and so cannot continue with transplant evaluation until he is able to come off of mechanical ventilation.    Problem List:  Decompensated cirrhosis  Hepatic encephalopathy  Elevated LFTs  JOHAN  PBC  Acute respiratory failure secondary to aspiration requiring intubation  Severe obesity with BMI > 50  Hx of alcohol use disorder, unclear if active      Recommendations:  - At this time, there are several significant barriers to transplant:  - Inability to participate in transplant evaluation; will need patient to be off of mechanical ventilation in order to discuss further.  - Concerns regarding adherence to medication and follow-up, though noting his prior non-adherence may have been due to factors out of his control  - Concerns regarding possible alcohol recidivism despite having been advised to stop, though unclear if he had been drinking  - Severe obesity with elevated BMI > 50; transplant surgery with 50% chance of mortality in patients with BMI > 50.    - Recommend broad infectious workup. Continue broad spectrum  antibiotics.  - Will follow-up PETH  - Please obtain daily CBC, CMP, and PT/INR  - Continue Lactulose and Rifaximin BID; titrate dose of lactulose to 3-4 BM a day.  - Continue Ursodiol for PBC, typically dosage is 13 to 15 mg/kg/day.  - Agree with Nephrology evaluation for worsening JOHAN      Thank you for involving us in the care of Marcellus Mendoza. Please call with any additional questions, concerns or changes in the patient's clinical status. We will continue to follow.     Adam Castillo MD  Internal Medicine PGY3  Hepatology

## 2024-11-21 NOTE — NURSING TRANSFER
Nursing Transfer Note      Patient was transferred to [facility] via EMS ([facility]).   Telephone report given to Fransico Pan RN updated on the patient status.  All IV drips continued (Levophed,Precedex, Fentanyl) and IV pumps was sent with the patient.

## 2024-11-21 NOTE — NURSING
Worsening agitation. Patient attempted to get out of bed and being aggressive at the same time.  Informed ELENA Kendrick NP  Second dose of Lorazepam given PRN as ordered, see flowsheet.  Precedex infusion started as ordered, see flowsheet.  Bilateral soft wrist restraints applied.

## 2024-11-21 NOTE — ASSESSMENT & PLAN NOTE
-management per primary  -transferred for liver transplant evaluation, per hepatology unable to consider transplant until patient extubated and able to participate in history to determine eligibility

## 2024-11-21 NOTE — ASSESSMENT & PLAN NOTE
39 year old hx of primary biliary cholangitis with cirrhosis, ETOH abuse, hypertension, presenting for SOB, now with ESLD. Transferred to [facility] for liver transplant evaluation. JOHAN likely 2/2 HRS. No known hx of CKD.     Plan:  -Recommend titrating levo to MAP goal > 85 for treatment of JOHAN 2/2 HRS  -Closely monitor I/Os  -Not a candidate for terlipressin in the setting of pulmonary edema  -If no increase in urine output, may require initiation of RRT  -Per hepatology, will need further information prior to determining eligibility for liver transplant  -Consent for dialysis obtained and placed in chart   -Avoid nephrotoxic agents, renally dose medications

## 2024-11-21 NOTE — ASSESSMENT & PLAN NOTE
Intubated for airway protection. Unable to tolerate bipap 2/2 mental status.  Vent bundle in place

## 2024-11-21 NOTE — HPI
Marcellus Mendoza is a 39 y.o. male with history of alcohol use disorder, PBC, BMI >50, and cirrhosis who presents as a transfer from [facility] for Hepatology/transplant evaluation in setting of decompensated cirrhosis. Patient initially presented to OSH with complaint of worsening shortness of breath and lower extremity swelling over the past couple weeks. He had difficulty refilling his ursodiol and furosemide so he had not been taking either of those for the last couple of months. Labs at the initial facility revealed T-bili 20, INR 2, and MELD score 30. He was then transferred to [facility] for further management. There he was started IV lasix + rocephin for SBP prevention with Lactulose + Rifaximin added to regimen. Course was complicated by increased work of breathing and worsening mental status, so decision was made to transfer patient to [facility] and admitted to MICU for higher level of care. Patient was intubated for airway protection prior to arrival given worsening mental status and concern for an aspiration event.  On arrival to the ICU at [facility], pt noted to be in shock, requiring pressors.  Hepatology consulted and likelihood of transplant very low.  PC consulted for assistance with GOC.     Liver history:  Presented to ED with jaundice and leg swelling in early 2024 and was subsequently sent to GI for further evaluation. Was seen in Feb 2024 and at that time was 3 weeks sober and had attended intensive outpatient program for alcohol rehab. Previously was drinking a fifth of schnapps daily per GI documentation. At that time was counseled on complete alcohol cessation. Underwent workup for cirrhosis and had biopsy performed in April that showed cirrhosis with lobular granuloma as well as positive AMA. Was told of PBC diagnosis at that time. Patient was unfortunately lost to follow-up for unclear reasons, but per family, it seems that the hepatologist he was referred to ended up leaving practice  so he was never successfully established with hepatology. At the same time, due to insurance issues, he was unable to afford his ursodiol and Lasix prescriptions and ended up being off of both medications for months prior to presentation. At [facility] he reportedly admitted to having drank alcohol recently up to his presentation, but family deny any knowledge of him drinking alcohol or reporting alcohol use to them.

## 2024-11-21 NOTE — CONSULTS
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 Mendoza is a 39-year-old male with past medical history of primary biliary cholangitis with cirrhosis, ETOH abuse, anasarca, GERD, HTN, morbid obesity, vitamin D deficiency who initially presented to [facility] with dyspnea and concern for acute heart failure exacerbation and decompensated cirrhosis. He was transferred to [facility] for higher level of care on 11/17. There he was started IV lasix + rocephin for SBP prevention with Lactulose + Rifaximin added to regimen. While at [facility], noted to be in ETOH withdrawal complicated by respiratory failure 2/2 aspiration. He was stable on Vapotherm but with worsening mental status- unable to tolerate bipap with concern for airway. He was intubated for airway protection prior to transport to [facility]. Transferred to [facility] 11/21 for liver transplant evaluation with decompensated cirrhosis, heart failure with subsequent respiratory failure and ETOH withdrawal. Nephrology consulted for JOHAN.    In regards to nephrology history, patient with no known hx of CKD. Cr 1.2 on admission to [facility]. Nephrology consulted at [facility] for JOHAN with Cr increasing to 1.5, thought to be secondary to HRS per their consult note yesterday 11/20. Urine Na < 20. Transferred to [facility] for liver transplant evaluation and nephrology consulted for continued evaluation and management of JOHAN.    Past Medical History:   Diagnosis Date    Alcohol abuse     Anasarca     GERD  (gastroesophageal reflux disease)     Hypertension     Obesity, unspecified     Unspecified cirrhosis of liver     Vitamin D deficiency        Past Surgical History:   Procedure Laterality Date    ORIF, FRACTURE, ANKLE, BIMALLEOLAR Right        Review of patient's allergies indicates:  No Known Allergies  Current Facility-Administered Medications   Medication Frequency    chlorhexidine 0.12 % solution 15 mL BID    fentaNYL 2500 mcg in 0.9% sodium chloride 250 mL infusion premix Continuous    heparin (porcine) injection 7,500 Units Q8H    lactulose 20 gram/30 mL solution Soln 20 g Q8H    mupirocin 2 % ointment BID    NORepinephrine 4 mg in sodium chloride 0.9% 250 mL infusion (premix) Continuous    pantoprazole injection 40 mg Daily    phytonadione vitamin k (AQUA-MEPHYTON) 10 mg in D5W 50 mL IVPB Daily    piperacillin-tazobactam (ZOSYN) 4.5 g in D5W 100 mL IVPB (MB+) Q8H    potassium bicarbonate disintegrating tablet 30 mEq Q2H    propofol (DIPRIVAN) 10 mg/mL infusion Continuous    rifAXIMin tablet 550 mg BID    sodium chloride 0.9% flush 10 mL PRN    ursodiol oral suspension (conc: 60 mg/mL) 600 mg TID    vancomycin - pharmacy to dose pharmacy to manage frequency     Family History       Problem Relation (Age of Onset)    Breast cancer Mother    Diabetes Father          Tobacco Use    Smoking status: Former     Current packs/day: 0.00     Types: Cigarettes     Quit date: 2023     Years since quittin.3    Smokeless tobacco: Never   Substance and Sexual Activity    Alcohol use: Not Currently     Alcohol/week: 5.0 standard drinks of alcohol     Types: 5 Drinks containing 0.5 oz of alcohol per week     Comment: reports last drink     Drug use: Not Currently    Sexual activity: Yes     Review of Systems   Unable to perform ROS: Intubated     Objective:     Vital Signs (Most Recent):  Temp: 99 °F (37.2 °C) (24 1500)  Pulse: 93 (24 1500)  Resp: (!) 31 (24 1500)  BP: (!) 114/54  (11/21/24 1500)  SpO2: 95 % (11/21/24 1500) Vital Signs (24h Range):  Temp:  [98.3 °F (36.8 °C)-99.1 °F (37.3 °C)] 99 °F (37.2 °C)  Pulse:  [] 93  Resp:  [12-54] 31  SpO2:  [87 %-100 %] 95 %  BP: ()/(35-83) 114/54     Weight: (!) 167.2 kg (368 lb 9.8 oz) (11/21/24 0950)  Body mass index is 52.89 kg/m².  Body surface area is 2.87 meters squared.    I/O last 3 completed shifts:  In: 0   Out: 250 [Urine:250]     Physical Exam  Vitals reviewed.   Constitutional:       General: He is in acute distress.      Appearance: He is ill-appearing.      Comments: Intubated and sedated   HENT:      Head: Normocephalic.   Pulmonary:      Comments: Intubated  Abdominal:      General: There is distension.   Musculoskeletal:      Right lower leg: Edema present.      Left lower leg: Edema present.   Skin:     General: Skin is warm and dry.      Coloration: Skin is jaundiced.   Neurological:      Comments: Sedated          Significant Labs:  CBC:   Recent Labs   Lab 11/21/24  0910   WBC 10.77   RBC 2.69*   HGB 8.9*   HCT 24.4*      MCV 91   MCH 33.1*   MCHC 36.5*     CMP:   Recent Labs   Lab 11/21/24  1052   *   CALCIUM 8.4*   ALBUMIN 2.1*   PROT 6.5   *   K 3.4*   CO2 24   CL 83*   BUN 28*   CREATININE 2.6*   ALKPHOS 187*   ALT 63*   *   BILITOT 24.3*     All labs within the past 24 hours have been reviewed.    Assessment/Plan:     Renal/  JOHAN (acute kidney injury)  39 year old hx of primary biliary cholangitis with cirrhosis, ETOH abuse, hypertension, presenting for SOB, now with ESLD. Transferred to [facility] for liver transplant evaluation. JOHAN likely 2/2 HRS. No known hx of CKD.     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 I will follow-up with patient. Please contact us if you have any additional questions.    Linsey Bateman PA-C  Nephrology  [facility] - Medical ICU

## 2024-11-21 NOTE — SUBJECTIVE & OBJECTIVE
Past Medical History:   Diagnosis Date    Alcohol abuse     Anasarca     GERD (gastroesophageal reflux disease)     Hypertension     Obesity, unspecified     Unspecified cirrhosis of liver     Vitamin D deficiency        Past Surgical History:   Procedure Laterality Date    ORIF, FRACTURE, ANKLE, BIMALLEOLAR Right        Review of patient's allergies indicates:  No Known Allergies  Current Facility-Administered Medications   Medication Frequency    chlorhexidine 0.12 % solution 15 mL BID    fentaNYL 2500 mcg in 0.9% sodium chloride 250 mL infusion premix Continuous    heparin (porcine) injection 7,500 Units Q8H    lactulose 20 gram/30 mL solution Soln 20 g Q8H    mupirocin 2 % ointment BID    NORepinephrine 4 mg in sodium chloride 0.9% 250 mL infusion (premix) Continuous    pantoprazole injection 40 mg Daily    phytonadione vitamin k (AQUA-MEPHYTON) 10 mg in D5W 50 mL IVPB Daily    piperacillin-tazobactam (ZOSYN) 4.5 g in D5W 100 mL IVPB (MB+) Q8H    potassium bicarbonate disintegrating tablet 30 mEq Q2H    propofol (DIPRIVAN) 10 mg/mL infusion Continuous    rifAXIMin tablet 550 mg BID    sodium chloride 0.9% flush 10 mL PRN    ursodiol oral suspension (conc: 60 mg/mL) 600 mg TID    vancomycin - pharmacy to dose pharmacy to manage frequency     Family History       Problem Relation (Age of Onset)    Breast cancer Mother    Diabetes Father          Tobacco Use    Smoking status: Former     Current packs/day: 0.00     Types: Cigarettes     Quit date: 2023     Years since quittin.3    Smokeless tobacco: Never   Substance and Sexual Activity    Alcohol use: Not Currently     Alcohol/week: 5.0 standard drinks of alcohol     Types: 5 Drinks containing 0.5 oz of alcohol per week     Comment: reports last drink     Drug use: Not Currently    Sexual activity: Yes     Review of Systems   Unable to perform ROS: Intubated     Objective:     Vital Signs (Most Recent):  Temp: 99 °F (37.2 °C) (24  1500)  Pulse: 93 (11/21/24 1500)  Resp: (!) 31 (11/21/24 1500)  BP: (!) 114/54 (11/21/24 1500)  SpO2: 95 % (11/21/24 1500) Vital Signs (24h Range):  Temp:  [98.3 °F (36.8 °C)-99.1 °F (37.3 °C)] 99 °F (37.2 °C)  Pulse:  [] 93  Resp:  [12-54] 31  SpO2:  [87 %-100 %] 95 %  BP: ()/(35-83) 114/54     Weight: (!) 167.2 kg (368 lb 9.8 oz) (11/21/24 0950)  Body mass index is 52.89 kg/m².  Body surface area is 2.87 meters squared.    I/O last 3 completed shifts:  In: 0   Out: 250 [Urine:250]     Physical Exam  Vitals reviewed.   Constitutional:       General: He is in acute distress.      Appearance: He is ill-appearing.      Comments: Intubated and sedated   HENT:      Head: Normocephalic.   Pulmonary:      Comments: Intubated  Abdominal:      General: There is distension.   Musculoskeletal:      Right lower leg: Edema present.      Left lower leg: Edema present.   Skin:     General: Skin is warm and dry.      Coloration: Skin is jaundiced.   Neurological:      Comments: Sedated          Significant Labs:  CBC:   Recent Labs   Lab 11/21/24  0910   WBC 10.77   RBC 2.69*   HGB 8.9*   HCT 24.4*      MCV 91   MCH 33.1*   MCHC 36.5*     CMP:   Recent Labs   Lab 11/21/24  1052   *   CALCIUM 8.4*   ALBUMIN 2.1*   PROT 6.5   *   K 3.4*   CO2 24   CL 83*   BUN 28*   CREATININE 2.6*   ALKPHOS 187*   ALT 63*   *   BILITOT 24.3*     All labs within the past 24 hours have been reviewed.

## 2024-11-21 NOTE — PLAN OF CARE
Recommendations     Initiate TFs when able. With current Propofol rate, rec'd Peptamen Intense VHP @ 35 mL/hr to provide 2165 kcals (1325 from Propofol), 77 g of protein, 706 mL fluid.  As Propofol weaned, increase TF (of Peptamen Intense VHP) to goal rate of 70 mL/hr = 1680 kcals, 155 g of protein, 1411 mL fluid.  RD to monitor & follow-up.     Goals: Meet % EEN, EPN by RD f/u date  Nutrition Goal Status: new  Communication of RD Recs: discussed on rounds

## 2024-11-21 NOTE — ASSESSMENT & PLAN NOTE
Cr 2.1; normal on initial presentation to OSH. Notable hyponatremia; anasarca.    --Maintain de la cruz  --Renal US  --UA; urine lytes  --Trend BMP  --Avoid nephrotoxic agents

## 2024-11-21 NOTE — ASSESSMENT & PLAN NOTE
"40 y/o m, h/o obesity, PBC, newly diagnosed cirrhosis in Feb 2024 likely related to both PBC and alcohol use disorder, now admitted with fulminant liver failure, multiorgan failure, shock.    Advance Care Planning     Medicolegal  Decision-making:   -Pt does not have decision-making capacity 2/2 intubation/sedation  -Pt has not appointed a HCPOA.  Legal surrogate decision makers would be patient's parents, Billie Cuevaselo and Liu Reji  -Marital status: single  -Children: none    Advance care planning/Advance directives:  -The patient has not previously engaged in advance care planning  -Pt has no scanned advance directives in [facility]    Psychosocial  Support system:  -Spiritual: not assessed today;  The palliative care  will be consulted to assess the patient.  -Family: involved and supportive.  Pt was living with his sister and her boyfriend prior to this hospitalization    Health status prior to this hospitalization  -Functional status: good.  Pt was able to manage ADLs  -Cognitive status: intact   -QOL: good    Prognosis:  -Time and potential for recovery: if renal function continues to worsen, it would be a sign that patient definitely at the EOL and time likely very short, on the order of days, less likely weeks.    GOC Discussion with pt's mother, father and stepmother:  -I met with the pt's family at bedside.  They were able to provide lots of helpful information about Isidoro's life.  He was described as "one of the sweetest, kindest people" in the world.  He has protected his family from much of the information about his illness as he has wanted to protect them from the bad news and hasn't want to burden anyone.  Isidoro was a young man with lots of potential, athletic, did well in school and went through a difficult break-up at age 23 and family describes him falling apart at this point and they feel that this is when he started on the trajectory that led him to this point today.  His community narrowed " considerably to family during this timeframe.  He and his mother have remained very close - speaking multiple times/day - but his interaction with other family and friends has been more limited.      Family struggling with feelings of guilt - feeling that there's something they could have done to intervene as pt's weight and drinking worsened.  I assured them that addiction is a very difficult disease to treat and that it is almost impossible to force a patient who is unwilling into therapy and rehab.      Family has a very good understanding of the clinical details of Will's case.  They seem relatively accepting of the news that he is unlikely to be a transplant candidate.  They have received wildly different information from doctors at the prior hospital and here about patient's prognosis so there is some frustration and confusion surrounding that issue.  They asked a lot of questions about patient's chances for recovery, next steps if he does not improve, what kind of timeframe we could be looking at in terms of prognosis.  We discussed the issue of dialysis and I told them that I would not recommend initiating dialysis should his kidney function continued to worsen, given that it would be secondary to his underlying liver disease and a bridge to know where.   They also asked questions about palliative extubation and I described at length what that process looks like including enrollment in hospice care.  Family receptive to this discussion.    All are in agreement that they would not want to see him suffer at the end of life.  At the same time, everybody is wondering whether there is any potential at all for patient to make any improvement at this stage of his illness before re-focusing exclusively on comfort-focused treatment.  We briefly discussed the issue of code status as well.  I made a strong recommendation against intervening should patient's heart stopped during his time in the ICU.  Family going to  talk this over.    Active symptoms  None at this time    Summary/Recommendation:  -Most important goals at this time: still being considered.  If patient's condition continues to decline, suspect family will opt for comfort-focus/hospice care  -We will see how Will does over the next 24 hours and made a plan to meet again tomorrow morning to discuss next steps based on his clinical status  -Code status: FULL - started conversation about code status today - will f/u tomorrow  -Most appropriate disposition: continue with the current LOC for now

## 2024-11-21 NOTE — SUBJECTIVE & OBJECTIVE
Past Medical History:   Diagnosis Date    Alcohol abuse     Anasarca     GERD (gastroesophageal reflux disease)     Hypertension     Obesity, unspecified     Unspecified cirrhosis of liver     Vitamin D deficiency        Past Surgical History:   Procedure Laterality Date    ORIF, FRACTURE, ANKLE, BIMALLEOLAR Right        Review of patient's allergies indicates:  No Known Allergies    Medications:  Continuous Infusions:   fentanyl  0-250 mcg/hr Intravenous Continuous 25 mL/hr at 24 1405 250 mcg/hr at 24 1405    NORepinephrine bitartrate-D5W  0-3 mcg/kg/min Intravenous Continuous        propofoL  0-50 mcg/kg/min Intravenous Continuous 50.2 mL/hr at 24 1300 50 mcg/kg/min at 24 1300     Scheduled Meds:   chlorhexidine  15 mL Mouth/Throat BID    heparin (porcine)  7,500 Units Subcutaneous Q8H    lactulose  20 g Per OG tube Q8H    mupirocin   Nasal BID    pantoprazole  40 mg Intravenous Daily    phytonadione vitamin k (AQUA-MEPHYTON) 10 mg in D5W 50 mL IVPB  10 mg Intravenous Daily    piperacillin-tazobactam (Zosyn) IV (PEDS and ADULTS) (extended infusion is not appropriate)  4.5 g Intravenous Q8H    rifAXImin  550 mg Per OG tube BID    ursodiol  600 mg Per OG tube TID     PRN Meds:  Current Facility-Administered Medications:     sodium chloride 0.9%, 10 mL, Intravenous, PRN    Pharmacy to dose Vancomycin consult, , , Once **AND** vancomycin - pharmacy to dose, , Intravenous, pharmacy to manage frequency    Family History       Problem Relation (Age of Onset)    Breast cancer Mother    Diabetes Father          Tobacco Use    Smoking status: Former     Current packs/day: 0.00     Types: Cigarettes     Quit date: 2023     Years since quittin.3    Smokeless tobacco: Never   Substance and Sexual Activity    Alcohol use: Not Currently     Alcohol/week: 5.0 standard drinks of alcohol     Types: 5 Drinks containing 0.5 oz of alcohol per week     Comment: reports last drink     Drug  use: Not Currently    Sexual activity: Yes       Review of Systems  Objective:     Vital Signs (Most Recent):  Temp: 98.7 °F (37.1 °C) (11/21/24 0745)  Pulse: 91 (11/21/24 1300)  Resp: (!) 25 (11/21/24 1300)  BP: (!) 102/50 (11/21/24 1300)  SpO2: 96 % (11/21/24 1300) Vital Signs (24h Range):  Temp:  [98.3 °F (36.8 °C)-99.8 °F (37.7 °C)] 98.7 °F (37.1 °C)  Pulse:  [] 91  Resp:  [12-54] 25  SpO2:  [87 %-100 %] 96 %  BP: ()/(35-83) 102/50     Weight: (!) 167.2 kg (368 lb 9.8 oz)  Body mass index is 52.89 kg/m².       Physical Exam  Vitals and nursing note reviewed.   Constitutional:       Appearance: He is morbidly obese. He is ill-appearing.      Interventions: He is sedated and intubated.   Eyes:      General: Scleral icterus present.   Pulmonary:      Effort: He is intubated.      Comments: On ventilator  Musculoskeletal:         General: Swelling present.   Skin:     Coloration: Skin is jaundiced.      Comments: jaundiced   Neurological:      Comments: Sedated, unresponsive to verbal stimuli            Review of Symptoms      Symptom Assessment (ESAS 0-10 Scale)  Unable to complete assessment due to Intubated         Pain Assessment in Advanced Demential Scale (PAINAD)   Breathing - Independent of vocalization:  0  Negative vocalization:  0  Facial expression:  0  Body language:  0  Consolability:  0  Total:  0    Living Arrangements:  Lives with family    Psychosocial/Cultural:   See Palliative Psychosocial Note: Yes  **Primary  to Follow**  Palliative Care  Consult: Yes        Advance Care Planning   Advance Directives:   Living Will: No    LaPOST: No    Do Not Resuscitate Status: No    Medical Power of : No      Decision Making:  Patient unable to communicate due to disease severity/cognitive impairment and Family answered questions  Goals of Care: Still being determined         CBC:   Recent Labs   Lab 11/21/24  0910   WBC 10.77   HGB 8.9*   HCT 24.4*   MCV 91   PLT  153     BMP:  Recent Labs   Lab 11/21/24  1052   *   *   K 3.4*   CL 83*   CO2 24   BUN 28*   CREATININE 2.6*   CALCIUM 8.4*   MG 2.1     LFT:  Lab Results   Component Value Date     (H) 11/21/2024    ALKPHOS 187 (H) 11/21/2024    BILITOT 24.3 (H) 11/21/2024     Albumin:   Albumin   Date Value Ref Range Status   11/21/2024 2.1 (L) 3.5 - 5.2 g/dL Final     Protein:   Total Protein   Date Value Ref Range Status   11/21/2024 6.5 6.0 - 8.4 g/dL Final     Lactic acid:   Lab Results   Component Value Date    LACTATE 1.0 11/20/2024    LACTATE 1.4 11/19/2024

## 2024-11-21 NOTE — NURSING
The patient was well sedated and unable to trigger breaths on EMS BIPAP. Consequently, for patient safety, the decision was made to intubate the patient prior to transfer.   The patient tolerated the intubation procedure well. Subsequently, a low dose of Levophed gtt was initiated along with sedation,see flowsheet

## 2024-11-21 NOTE — DISCHARGE SUMMARY
[facility] - Intensive Care ([facility])  Critical Care Medicine  Discharge Summary      Patient Name: Marcellus Mendoza  MRN: [id number]  Admission Date: 11/17/2024  Hospital Length of Stay: 3 days  Discharge Date and Time:  11/21/2024 1:55 AM  Attending Physician: Saturnino Flor MD   Discharging Provider: Nidhi Islas NP  Primary Care Provider: Katlin, Primary Doctor  Reason for Admission: Hepatic cirrhosis, acute liver failure    HPI:   Mr. Mendoza is a 39-year-old male with past medical history of PBC cirrhosis, ETOH abuse, anasarca, GERD, HTN, morbid obesity, vitamin D deficiency who was transferred from outside facility for hepatology involvement and higher level of care. Patient had evidence of HF exacerbation and decompensated cirrhosis. Reports worsening shortness of breath and lower extremity swelling over the past couple weeks prompting him to report to ED for evaluation. Symptoms worse with exertion and lying flat. He also decribes associated abdominal pain/nausea (which he has at baseline). He has had difficulty refilling his urosdiol and furosemide so he hasn't been taking either of those over the past couple months. Denied any fever, chills, sick contact, vomiting, chest pain. Notable lab workup at outside facility revealed T bili 20, INR 2, MELD score 30. Dr. Macias with hepatology agreed with transfer to [facility].     On admission, patient comfortable on RA, AAOx4, notable jaundice. Noticed some conversational dyspnea. He was started on IV lasix + rocephin for SBP prevention. Abdominal notably distended, US abdomen with no significant ascites though. Lactulose + Rifaxamin added to regimen, along with fluid restrictions. Yesterday, patient had increased wob throughout the day, placed on bipap yesterday evening. Worsening hepatic encephalopathy, jaundice, dyspnea over course of day, decision was made for transfer to [facility] for transplant involvement.     Pending transfer, patient became more  combative, confused overnight. Tachycardic with HR in 110s but other vitals stable. Patient given ativan 2mg with concern for withdrawals. Stat labs ordered. ABG obtained. Desats earlier this morning and repeat CXR with bilateral pleural effusion and there was concern for possible aspiration at some point earlier in the night. IV abx were broadened. Has had good UOP with IV 40 lasix bid. He was transitioned to vapotherm 20/50 on arrival to unit and mentation has improved throughout the morning. Transfer service contacted again regarding upgraded status to ICU. Dr. Lea, MICU at [facility] agreed to acceptance. Awaiting transfer at this time.     * No surgery found *    Indwelling Lines/Drains at Time of Discharge:   Lines/Drains/Airways       Drain  Duration                  Urethral Catheter 11/19/24 1847 16 Fr. 1 day                   dexmedeTOMIDine (Precedex) infusion (titrating)  0-1.4 mcg/kg/hr Intravenous Continuous 8.36 mL/hr at 11/21/24 0144 0.2 mcg/kg/hr at 11/21/24 0144    fentanyl  0-250 mcg/hr Intravenous Continuous 2.5 mL/hr at 11/21/24 0137 25 mcg/hr at 11/21/24 0137    NORepinephrine bitartrate-D5W  0-0.2 mcg/kg/min Intravenous Continuous 62.7 mL/hr at 11/21/24 0110 0.1 mcg/kg/min at 11/21/24 0110       Hospital Course:   40 y/o man with acute liver failure and active alcohol use comes to the ICU from the floor this morning for increasing oxygen requirement overnight and concern for aspiration. Reported worsening swallowing ability w/increased confusion, which seems to be more significant at night. He is more comfortable on Vapotherm 20L @ 60%, though remains tachypneic. Tbili uptrending, Cr uptrending despite decent UOP. Broadened Abx to Vanc/Zosyn given c/f aspiration. Blood Cx pending. PFC updatedf on status change, patient has been accepted for transfer to MICU at [facility] for Hepatology and critical care. Plan was to utilize NIV for transport. While awaiting transport arrival, patient with more  "confusion, agitation despite ativan. Initiated on Precedex with good response and stable ABG. Attempted NIV on transport vent, however patient was not pulling enough volume to trigger breath and equipment unable to set back up rate or PS. Decision was made to intubate patient for safety, airway protection. Receiving provider and flow center updated.      Consults (From admission, onward)          Status Ordering Provider     Inpatient consult to Midline team  Once        Provider:  (Not yet assigned)    Acknowledged BOGDAN ANDUJAR     Pharmacy to dose Vancomycin consult  Once        Provider:  (Not yet assigned)   Placed in "And" Linked Group    Acknowledged KITA GRIFFITH     Inpatient consult to Critical Care Medicine  Once        Provider:  Kita Griffith MD    Completed KAN CHOWDHURY     Inpatient consult to Nephrology  Once        Provider:  Darshan Molina MD    Completed KAN COHWDHURY     Inpatient consult to Cardiology  Once        Provider:  Alexa Kenyon MD    Completed VERONICA SWARTZ     Inpatient consult to Social Work/Case Management  Once        Provider:  (Not yet assigned)    Completed VERONICA SWARTZ     Inpatient consult to Registered Dietitian/Nutritionist  Once        Provider:  (Not yet assigned)    Completed VERONICA SWARTZ     Inpatient Consult to Telemedicine - Hepatology  Once        Provider:  Harmeet Macias MD    Completed VERONICA SWARTZ          Significant Labs:  All pertinent labs within the past 24 hours have been reviewed.  ABG  Recent Labs   Lab 11/20/24  2354   PH 7.403   PO2 81   PCO2 46.6*   HCO3 29.0*   BE 4*       Significant Imaging:  I have reviewed all pertinent imaging results/findings within the past 24 hours.    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  Service: Pulmonology    Discharged Condition: stable    Disposition: Another Health Care Inst*  [facility]    Patient Instructions:   No discharge procedures on file.  Medications:  None     Nidhi Islas NP  Critical Care Medicine  [facility] - Intensive Care ([facility])

## 2024-11-21 NOTE — CONSULTS
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      Inpatient consult to Palliative Care  Consult performed by: Perlita Pop MD  Consult ordered by: Rebekah Rollins MD        Assessment/Plan:     Palliative Care  Palliative care encounter  40 y/o m, h/o obesity, PBC, newly diagnosed cirrhosis in Feb 2024 likely related to both PBC and alcohol use disorder, now admitted with fulminant liver failure, multiorgan failure, shock.    Advance Care Planning    Medicolegal  Decision-making:   -Pt does not have decision-making capacity 2/2 intubation/sedation  -Pt has not appointed a HCPOA.  Legal surrogate decision makers would be patient's parents, Billie Rosa and Liu Mendoza  -Marital status: single  -Children: none    Advance care planning/Advance directives:  -The patient has not previously engaged in advance care planning  -Pt has no scanned advance directives in [facility]    Psychosocial  Support system:  -Spiritual: not assessed today;  The palliative care  will be consulted to assess the patient.  -Family: involved and supportive.  Pt was living with his sister and her boyfriend prior to this hospitalization    Health status prior to this hospitalization  -Functional status: good.  Pt was able to manage ADLs  -Cognitive status: intact   -QOL: good    Prognosis:  -Time and potential for recovery: if renal function continues to worsen, it would be a sign that patient definitely at the EOL and time likely very short, on the order of days, less likely weeks.    GOC Discussion with pt's mother,  "father and stepmother:  -I met with the pt's family at bedside.  They were able to provide lots of helpful information about Isidoro's life.  He was described as "one of the sweetest, kindest people" in the world.  He has protected his family from much of the information about his illness as he has wanted to protect them from the bad news and hasn't want to burden anyone.  Isidoro was a young man with lots of potential, athletic, did well in school and went through a difficult break-up at age 23 and family describes him falling apart at this point and they feel that this is when he started on the trajectory that led him to this point today.  His community narrowed considerably to family during this timeframe.  He and his mother have remained very close - speaking multiple times/day - but his interaction with other family and friends has been more limited.      Family struggling with feelings of guilt - feeling that there's something they could have done to intervene as pt's weight and drinking worsened.  I assured them that addiction is a very difficult disease to treat and that it is almost impossible to force a patient who is unwilling into therapy and rehab.      Family has a very good understanding of the clinical details of Isidoro's case.  They seem relatively accepting of the news that he is unlikely to be a transplant candidate.  They have received wildly different information from doctors at the prior hospital and here about patient's prognosis so there is some frustration and confusion surrounding that issue.  They asked a lot of questions about patient's chances for recovery, next steps if he does not improve, what kind of timeframe we could be looking at in terms of prognosis.  We discussed the issue of dialysis and I told them that I would not recommend initiating dialysis should his kidney function continued to worsen, given that it would be secondary to his underlying liver disease and a bridge to know where.  "  They also asked questions about palliative extubation and I described at length what that process looks like including enrollment in hospice care.  Family receptive to this discussion.    All are in agreement that they would not want to see him suffer at the end of life.  At the same time, everybody is wondering whether there is any potential at all for patient to make any improvement at this stage of his illness before re-focusing exclusively on comfort-focused treatment.  We briefly discussed the issue of code status as well.  I made a strong recommendation against intervening should patient's heart stopped during his time in the ICU.  Family going to talk this over.    Active symptoms  None at this time    Summary/Recommendation:  -Most important goals at this time: still being considered.  If patient's condition continues to decline, suspect family will opt for comfort-focus/hospice care  -We will see how Will does over the next 24 hours and made a plan to meet again tomorrow morning to discuss next steps based on his clinical status  -Code status: FULL - started conversation about code status today - will f/u tomorrow  -Most appropriate disposition: continue with the current LOC for now        Thank you for your consult. I will follow-up with patient. Please contact us if you have any additional questions.    Subjective:     HPI:   Marcellus Mendoza is a 39 y.o. male with history of alcohol use disorder, PBC, BMI >50, and cirrhosis who presents as a transfer from [facility] for Hepatology/transplant evaluation in setting of decompensated cirrhosis. Patient initially presented to OSH with complaint of worsening shortness of breath and lower extremity swelling over the past couple weeks. He had difficulty refilling his ursodiol and furosemide so he had not been taking either of those for the last couple of months. Labs at the initial facility revealed T-bili 20, INR 2, and MELD score 30. He was then  transferred to [facility] for further management. There he was started IV lasix + rocephin for SBP prevention with Lactulose + Rifaximin added to regimen. Course was complicated by increased work of breathing and worsening mental status, so decision was made to transfer patient to [facility] and admitted to MICU for higher level of care. Patient was intubated for airway protection prior to arrival given worsening mental status and concern for an aspiration event.  On arrival to the ICU at [facility] pt noted to be in shock, requiring pressors.  Hepatology consulted and likelihood of transplant very low.  PC consulted for assistance with GOC.     Liver history:  Presented to ED with jaundice and leg swelling in early 2024 and was subsequently sent to GI for further evaluation. Was seen in Feb 2024 and at that time was 3 weeks sober and had attended intensive outpatient program for alcohol rehab. Previously was drinking a fifth of schnapps daily per GI documentation. At that time was counseled on complete alcohol cessation. Underwent workup for cirrhosis and had biopsy performed in April that showed cirrhosis with lobular granuloma as well as positive AMA. Was told of PBC diagnosis at that time. Patient was unfortunately lost to follow-up for unclear reasons, but per family, it seems that the hepatologist he was referred to ended up leaving practice so he was never successfully established with hepatology. At the same time, due to insurance issues, he was unable to afford his ursodiol and Lasix prescriptions and ended up being off of both medications for months prior to presentation. At [facility] he reportedly admitted to having drank alcohol recently up to his presentation, but family deny any knowledge of him drinking alcohol or reporting alcohol use to them.    Hospital Course:  No notes on file        Past Medical History:   Diagnosis Date    Alcohol abuse     Anasarca     GERD (gastroesophageal reflux disease)      Hypertension     Obesity, unspecified     Unspecified cirrhosis of liver     Vitamin D deficiency        Past Surgical History:   Procedure Laterality Date    ORIF, FRACTURE, ANKLE, BIMALLEOLAR Right        Review of patient's allergies indicates:  No Known Allergies    Medications:  Continuous Infusions:   fentanyl  0-250 mcg/hr Intravenous Continuous 25 mL/hr at 24 1405 250 mcg/hr at 24 1405    NORepinephrine bitartrate-D5W  0-3 mcg/kg/min Intravenous Continuous        propofoL  0-50 mcg/kg/min Intravenous Continuous 50.2 mL/hr at 24 1300 50 mcg/kg/min at 24 1300     Scheduled Meds:   chlorhexidine  15 mL Mouth/Throat BID    heparin (porcine)  7,500 Units Subcutaneous Q8H    lactulose  20 g Per OG tube Q8H    mupirocin   Nasal BID    pantoprazole  40 mg Intravenous Daily    phytonadione vitamin k (AQUA-MEPHYTON) 10 mg in D5W 50 mL IVPB  10 mg Intravenous Daily    piperacillin-tazobactam (Zosyn) IV (PEDS and ADULTS) (extended infusion is not appropriate)  4.5 g Intravenous Q8H    rifAXImin  550 mg Per OG tube BID    ursodiol  600 mg Per OG tube TID     PRN Meds:  Current Facility-Administered Medications:     sodium chloride 0.9%, 10 mL, Intravenous, PRN    Pharmacy to dose Vancomycin consult, , , Once **AND** vancomycin - pharmacy to dose, , Intravenous, pharmacy to manage frequency    Family History       Problem Relation (Age of Onset)    Breast cancer Mother    Diabetes Father          Tobacco Use    Smoking status: Former     Current packs/day: 0.00     Types: Cigarettes     Quit date: 2023     Years since quittin.3    Smokeless tobacco: Never   Substance and Sexual Activity    Alcohol use: Not Currently     Alcohol/week: 5.0 standard drinks of alcohol     Types: 5 Drinks containing 0.5 oz of alcohol per week     Comment: reports last drink     Drug use: Not Currently    Sexual activity: Yes       Review of Systems  Objective:     Vital Signs (Most Recent):  Temp:  98.7 °F (37.1 °C) (11/21/24 0745)  Pulse: 91 (11/21/24 1300)  Resp: (!) 25 (11/21/24 1300)  BP: (!) 102/50 (11/21/24 1300)  SpO2: 96 % (11/21/24 1300) Vital Signs (24h Range):  Temp:  [98.3 °F (36.8 °C)-99.8 °F (37.7 °C)] 98.7 °F (37.1 °C)  Pulse:  [] 91  Resp:  [12-54] 25  SpO2:  [87 %-100 %] 96 %  BP: ()/(35-83) 102/50     Weight: (!) 167.2 kg (368 lb 9.8 oz)  Body mass index is 52.89 kg/m².       Physical Exam  Vitals and nursing note reviewed.   Constitutional:       Appearance: He is morbidly obese. He is ill-appearing.      Interventions: He is sedated and intubated.   Eyes:      General: Scleral icterus present.   Pulmonary:      Effort: He is intubated.      Comments: On ventilator  Musculoskeletal:         General: Swelling present.   Skin:     Coloration: Skin is jaundiced.      Comments: jaundiced   Neurological:      Comments: Sedated, unresponsive to verbal stimuli            Review of Symptoms      Symptom Assessment (ESAS 0-10 Scale)  Unable to complete assessment due to Intubated         Pain Assessment in Advanced Demential Scale (PAINAD)   Breathing - Independent of vocalization:  0  Negative vocalization:  0  Facial expression:  0  Body language:  0  Consolability:  0  Total:  0    Living Arrangements:  Lives with family    Psychosocial/Cultural:   See Palliative Psychosocial Note: Yes  **Primary  to Follow**  Palliative Care  Consult: Yes        Advance Care Planning  Advance Directives:   Living Will: No    LaPOST: No    Do Not Resuscitate Status: No    Medical Power of : No      Decision Making:  Patient unable to communicate due to disease severity/cognitive impairment and Family answered questions  Goals of Care: Still being determined         CBC:   Recent Labs   Lab 11/21/24  0910   WBC 10.77   HGB 8.9*   HCT 24.4*   MCV 91        BMP:  Recent Labs   Lab 11/21/24  1052   *   *   K 3.4*   CL 83*   CO2 24   BUN 28*   CREATININE  2.6*   CALCIUM 8.4*   MG 2.1     LFT:  Lab Results   Component Value Date     (H) 11/21/2024    ALKPHOS 187 (H) 11/21/2024    BILITOT 24.3 (H) 11/21/2024     Albumin:   Albumin   Date Value Ref Range Status   11/21/2024 2.1 (L) 3.5 - 5.2 g/dL Final     Protein:   Total Protein   Date Value Ref Range Status   11/21/2024 6.5 6.0 - 8.4 g/dL Final     Lactic acid:   Lab Results   Component Value Date    LACTATE 1.0 11/20/2024    LACTATE 1.4 11/19/2024         I spent a total of 70 minutes on the day of the visit. This includes face to face time in discussion of goals of care, symptom assessment, coordination of care and emotional support.  This also includes non-face to face time preparing to see the patient (eg, review of tests/imaging), obtaining and/or reviewing separately obtained history, documenting clinical information in the electronic or other health record, independently interpreting results and communicating results to the patient/family/caregiver, or care coordinator.    Perlita Pop MD  Palliative Medicine  [facility] - Medical ICU

## 2024-11-21 NOTE — PROGRESS NOTES
Pharmacokinetic Initial Assessment: IV Vancomycin    Assessment/Plan:    Initiated intravenous vancomycin with loading dose of 2000 mg once at [facility] with subsequent doses when random concentrations are less than 20 mcg/mL  Desired empiric serum trough concentration is 15 to 20 mcg/mL  Draw vancomycin random level on 11/21/24 at 1000.  Pharmacy will continue to follow and monitor vancomycin.      Please contact pharmacy at extension [phone] with any questions regarding this assessment.     Thank you for the consult,   Shruthi Johnson       Patient brief summary:  Marcellus Mendoza is a 39 y.o. male initiated on antimicrobial therapy with IV Vancomycin for treatment of suspected sepsis    Drug Allergies:   Review of patient's allergies indicates:  No Known Allergies    Actual Body Weight:   167.2 kg    Renal Function:   Estimated Creatinine Clearance: 75.4 mL/min (A) (based on SCr of 2.1 mg/dL (H)).,     Dialysis Method (if applicable):  N/A    CBC (last 72 hours):  Recent Labs   Lab Result Units 11/18/24  0616 11/19/24  0454 11/20/24 0226   WBC K/uL 8.56 7.29 9.38   Hemoglobin g/dL 9.6* 9.1* 9.3*   Hematocrit % 26.5* 25.8* 26.9*   Platelets K/uL 126* 116* 110*   Gran % % 76.7* 70.7 65.7   Lymph % % 5.6* 6.9* 8.0*   Mono % % 14.3 18.0* 19.1*   Eosinophil % % 1.5 2.3 3.2   Basophil % % 0.4 0.3 0.5   Differential Method  Automated Automated Automated       Metabolic Panel (last 72 hours):  Recent Labs   Lab Result Units 11/18/24  0616 11/19/24  0454 11/20/24  0226 11/20/24  0820 11/20/24  0821 11/20/24  1038 11/20/24  1350   Sodium mmol/L 122* 123* 124*  --   --   --  121*   Sodium, Urine mmol/L  --   --   --   --  <20*  --   --    Potassium mmol/L 3.2* 3.0* 2.9*  --   --  3.5 3.2*   Potassium, Urine mmol/L  --   --   --   --  30  --   --    Chloride mmol/L 83* 83* 83*  --   --   --  82*   CO2 mmol/L 25 26 25  --   --   --  24   Glucose mg/dL 108 100 103  --   --   --  166*   Glucose, UA   --   --   --  Negative  --    "--   --    BUN mg/dL 13 19 23*  --   --   --  24*   Creatinine mg/dL 1.2 1.5* 1.9*  --   --   --  2.1*   Albumin g/dL 2.0* 2.0* 2.2*  --   --   --  2.2*   Total Bilirubin mg/dL 21.3* 23.8* 24.4*  --   --   --   --    Alkaline Phosphatase U/L 212* 215* 216*  --   --   --   --    AST U/L 155* 158* 154*  --   --   --   --    ALT U/L 59* 62* 64*  --   --   --   --    Magnesium mg/dL 1.6  --  1.8  --   --   --   --    Phosphorus mg/dL  --   --  2.3*  --   --   --  2.6*  2.6*       Drug levels (last 3 results):  No results for input(s): "VANCOMYCINRA", "VANCORANDOM", "VANCOMYCINPE", "VANCOPEAK", "VANCOMYCINTR", "VANCOTROUGH" in the last 72 hours.    Microbiologic Results:  Microbiology Results (last 7 days)       Procedure Component Value Units Date/Time    MRSA Screen by PCR [[id number]]     Order Status: No result Specimen: Nasal Swab     Culture, Respiratory with Gram Stain [[id number]]     Order Status: No result Specimen: Sputum from Endotracheal Aspirate     Respiratory Infection Panel (PCR), Nasopharyngeal [[id number]]     Order Status: No result Specimen: Nasopharyngeal Swab             "

## 2024-11-21 NOTE — ACP (ADVANCE CARE PLANNING)
Advance Directives:     Decision Making:  Family answered questions and Patient unable to communicate due to disease severity/cognitive impairment  Goals of Care: The family endorses that what is most important right now is to focus on curative/life-prolongation (regardless of treatment burdens)    Accordingly, we have decided that the best plan to meet the patient's goals includes continuing with treatment, awaiting transfer to [facility] for Hepatology.     Time spent on this ACP discussion: 17 minutes    ALANA Jesus-AG  Critical Care Medicine  [facility]

## 2024-11-21 NOTE — H&P
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 Mendoza is a 39-year-old male with past medical history of primary biliary cholangitis with cirrhosis, ETOH abuse, anasarca, GERD, HTN, morbid obesity, vitamin D deficiency who initially presented to [facility] with dyspnea and concern for acute heart failure exacerbation and decompensated cirrhosis. He was transferred to [facility] for higher level of care on 11/17 and now to [facility] for liver transplant evaluation. While at [facility], noted to be in ETOH withdrawal complicated by respiratory failure 2/2 aspiration. He was stable on Vapotherm but with worsening mental status- unable to tolerate bipap with concern for airway. He was intubated for airway protection prior to transport to [facility].      Transferred to [facility] 11/21 for liver transplant evaluation with decompensated cirrhosis, heart failure with subsequent respiratory failure and ETOH withdrawal.    Hospital/ICU Course:  No notes on file     Past Medical History:   Diagnosis Date    Alcohol abuse     Anasarca     GERD (gastroesophageal reflux disease)     Hypertension     Obesity, unspecified     Unspecified cirrhosis of liver     Vitamin D deficiency        Past Surgical History:   Procedure Laterality Date    ORIF, FRACTURE, ANKLE, BIMALLEOLAR Right        Review of patient's allergies indicates:  No Known Allergies    Family History       Problem Relation (Age of Onset)    Breast cancer Mother    Diabetes Father          Tobacco Use    Smoking status: Former     Current packs/day: 0.00     Types: Cigarettes     Quit date: 7/1/2023     Years since  quittin.3    Smokeless tobacco: Never   Substance and Sexual Activity    Alcohol use: Not Currently     Alcohol/week: 5.0 standard drinks of alcohol     Types: 5 Drinks containing 0.5 oz of alcohol per week     Comment: reports last drink     Drug use: Not Currently    Sexual activity: Yes      Review of Systems   Unable to perform ROS: Intubated     Objective:     Vital Signs (Most Recent):  Pulse: (!) 119 (24)  Resp: (!) 38 (24)  BP: (!) 120/58 (24)  SpO2: 97 % (24) Vital Signs (24h Range):  Temp:  [98.1 °F (36.7 °C)-99.8 °F (37.7 °C)] 99.1 °F (37.3 °C)  Pulse:  [] 119  Resp:  [12-44] 38  SpO2:  [87 %-99 %] 97 %  BP: ()/(35-83) 120/58   Weight: (!) 167.2 kg (368 lb 9.8 oz)  Body mass index is 52.89 kg/m².    No intake or output data in the 24 hours ending 24       Physical Exam  Constitutional:       Appearance: He is ill-appearing.      Interventions: He is sedated and intubated.   HENT:      Head: Normocephalic and atraumatic.   Cardiovascular:      Rate and Rhythm: Regular rhythm. Tachycardia present.      Heart sounds: S1 normal and S2 normal. Murmur heard.   Pulmonary:      Effort: He is intubated.      Breath sounds: No decreased breath sounds, wheezing, rhonchi or rales.   Abdominal:      General: Abdomen is protuberant.      Palpations: Abdomen is soft.   Musculoskeletal:      Cervical back: Neck supple.   Skin:     Coloration: Skin is jaundiced.   Neurological:      Comments: Opens eyes; does not follow commands            Vents:  Vent Mode: A/C (24)  Ventilator Initiated: Yes (24)  Set Rate: 22 BPM (24)  Vt Set: 450 mL (24)  PEEP/CPAP: 5 cmH20 (24)  Oxygen Concentration (%): 40 (24)  Peak Airway Pressure: 25 cmH20 (24)  Plateau Pressure: 0 cmH20 (24)  Total Ve: 14.2 L/m (24)  Negative Inspiratory Force (cm H2O): 0 (24  0435)  F/VT Ratio<105 (RSBI): (!) 89.2 (11/21/24 0435)  Lines/Drains/Airways       Drain  Duration                  Urethral Catheter 11/19/24 1847 16 Fr. 1 day         NG/OG Tube 11/21/24 0130 Cherry sump 16 Fr. Right mouth <1 day              Airway  Duration                  Airway - Non-Surgical 11/21/24 0120 Endotracheal Tube <1 day              Peripheral Intravenous Line  Duration                  Peripheral IV - Single Lumen 11/20/24 0420 20 G Anterior;Right Forearm 1 day         Midline Catheter - Single Lumen 11/20/24 1152 Right cephalic vein (lateral side of arm) 20g x 8cm <1 day                  Significant Labs:    CBC/Anemia Profile:  Recent Labs   Lab 11/20/24 0226 11/20/24  1826   WBC 9.38  --    HGB 9.3*  --    HCT 26.9* 31*   *  --    MCV 92  --    RDW 25.4*  --         Chemistries:  Recent Labs   Lab 11/20/24 0226 11/20/24  1038 11/20/24  1350   *  --  121*   K 2.9* 3.5 3.2*   CL 83*  --  82*   CO2 25  --  24   BUN 23*  --  24*   CREATININE 1.9*  --  2.1*   CALCIUM 8.6*  --  8.3*   ALBUMIN 2.2*  --  2.2*   PROT 6.7  --   --    BILITOT 24.4*  --   --    ALKPHOS 216*  --   --    ALT 64*  --   --    *  --   --    MG 1.8  --   --    PHOS 2.3*  --  2.6*  2.6*       All pertinent labs within the past 24 hours have been reviewed.    Significant Imaging: I have reviewed all pertinent imaging results/findings within the past 24 hours.  Assessment/Plan:     Psychiatric  Alcohol withdrawal syndrome, with delirium  Complicated by hepatic encephalopathy    --CIWA  --Consult addiction psych when able    Pulmonary  On mechanically assisted ventilation  Intubated for airway protection. Unable to tolerate bipap 2/2 mental status.  Vent bundle in place    Acute hypoxemic respiratory failure  Patient with Hypoxic Respiratory failure which is Acute.  he is not on home oxygen. Supplemental oxygen was provided and noted- Oxygen Concentration (%):  [6-60] 60    .   Signs/symptoms of respiratory  failure include- respiratory distress, lethargy, and cyanosis. Contributing diagnoses includes - Aspiration Labs and images were reviewed. Patient Has recent ABG, which has been reviewed. Will treat underlying causes and adjust management of respiratory failure as follows:    Patient was stable on Vapotherm at OSH with 50% FiO2; thought to be 2/2 aspiration pneumonia. CXR with concern for pulmonary edema. Echo 11/18 with normal diastolic function. Likely vascular congestion 2/2 portal hypertension. Intubated for airway protection.    --Vent bundle in place  --Daily SAT/SBT  --Wean FiO2 for SpO2>92%  --Diurese  --Broad-spectrum antibiotics  --Sputum culture  --RIP      Cardiac/Vascular  Hypertension  On vasopressor support on arrival; quickly weaned off    --Resume antihypertensives when clinically appropriate    Renal/  JOHAN (acute kidney injury)  Cr 2.1; normal on initial presentation to OSH. Notable hyponatremia; anasarca.    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 Transferred here for liver transplant evaluation.  MELD 3.0: 35 at 11/20/2024  1:50 PM  MELD-Na: 35 at 11/20/2024  1:50 PM  Calculated from:  Serum Creatinine: 2.1 mg/dL at 11/20/2024  1:50 PM  Serum Sodium: 121 mmol/L (Using min of 125 mmol/L) at 11/20/2024  1:50 PM  Total Bilirubin: 24.4 mg/dL at 11/20/2024  2:26 AM  Serum Albumin: 2.2 g/dL at 11/20/2024  1:50 PM  INR(ratio): 1.8 at 11/20/2024  2:34 AM  Age at listing (hypothetical): 39 years  Sex: Male at 11/20/2024  1:50 PM     --Consult hepatology; appreciate recs  --Lactulose; rifaximin  --Liver US w/ doppler  --Paracentesis if able  --Trend LFTs  --Avoid hepatotoxic agents    Acute hepatic encephalopathy  Complicated by ETOH withdrawal per  OSH    --Lactulose  --CIWA       Critical Care Time: 60 minutes  Critical secondary to Patient has a condition that poses threat to life and bodily function: Severe Respiratory Distress    Critical care was time spent personally by me on the following activities: development of treatment plan with patient or surrogate and bedside caregivers, discussions with consultants, evaluation of patient's response to treatment, examination of patient, ordering and performing treatments and interventions, ordering and review of laboratory studies, ordering and review of radiographic studies, pulse oximetry, re-evaluation of patient's condition. This critical care time did not overlap with that of any other provider or involve time for any procedures.     Nidhi Acevedo, NP  Critical Care Medicine  [facility] - Medical ICU

## 2024-11-21 NOTE — CONSULTS
"  [facility] - Medical ICU  Adult Nutrition  Consult Note    SUMMARY     Recommendations    Initiate TFs when able. With current Propofol rate, rec'd Peptamen Intense VHP @ 35 mL/hr to provide 2165 kcals (1325 from Propofol), 77 g of protein, 706 mL fluid.  As Propofol weaned, increase TF (of Peptamen Intense VHP) to goal rate of 70 mL/hr = 1680 kcals, 155 g of protein, 1411 mL fluid.  RD to monitor & follow-up.    Goals: Meet % EEN, EPN by RD f/u date  Nutrition Goal Status: new  Communication of RD Recs: discussed on rounds    Assessment and Plan    Nutrition Problem:  Inadequate energy intake    Related to (etiology):   Inability to consume sufficient energy     Signs and Symptoms (as evidenced by):   NPO    Interventions(treatment strategy):  Collaboration of nutrition care w/ other providers    Nutrition Diagnosis Status:   New     Reason for Assessment    Reason For Assessment: consult  Diagnosis: other (see comments) (Cirrhosis; PMH: Etoh abuse)    General Information Comments: Intubated, sedated - information obtained from Andrea Oakley RD assessment (11/19): pt reported good appetite PTA & UBW of 360#. NFPE was complete w/ no indicators of malnutrition noted. Low Na diet education also complete.  Nutrition Discharge Planning: Pending clinical course  SDOH: Unable to assess at this time.     Nutrition/Diet History    Patient Reported Diet/Restrictions/Preferences: general  Food Allergies: NKFA  Factors Affecting Nutritional Intake: NPO, on mechanical ventilation    Anthropometrics    Temp: 98.7 °F (37.1 °C)  Height: 5' 10" (177.8 cm)  Height (inches): 70 in  Weight: (!) 167.2 kg (368 lb 9.8 oz)  Weight (lb): (!) 368.61 lb  Ideal Body Weight (IBW), Male: 166 lb  % Ideal Body Weight, Male (lb): 222.05 %  BMI (Calculated): 52.9  BMI Grade: greater than 40 - morbid obesity  Usual Body Weight (UBW), kg: (!) 163.6 kg  % Usual Body Weight: 102.41  % Weight Change From Usual Weight: 2.2 " %    Lab/Procedures/Meds    Pertinent Labs Reviewed: reviewed  Pertinent Labs Comments: Na 121, Creat 2.1, GFR 41, Bili 24.4  Pertinent Medications Reviewed: reviewed  Pertinent Medications Comments: Fentanyl, Levophed, Propofol    Estimated/Assessed Needs    Weight Used For Calorie Calculations: (!) 167.2 kg (368 lb 9.8 oz)    Energy Calorie Requirements (kcal): 4810-6668 kcal/d  Energy Need Method: Kcal/kg (11-14 kcal/kg, BMI > 40 + vent)    Protein Requirements: 151 g/d (2 g/kg IBW)  Weight Used For Protein Calculations: 75.5 kg (166 lb 7.2 oz)    Estimated Fluid Requirement Method: other (see comments) (Per MD)  RDA Method (mL): 1839    Nutrition Prescription Ordered    Current Diet Order: NPO    Evaluation of Received Nutrient/Fluid Intake    Other Calories (kcal): 1325 (Propofol)    I/O: -3.3L since admit    Comments: LBM: 11/20    Nutrition Risk    Level of Risk/Frequency of Follow-up:  (1x/week)     Monitor and Evaluation    Food and Nutrient Intake: food and beverage intake, enteral nutrition intake, energy intake  Food and Nutrient Adminstration: diet order, enteral and parenteral nutrition administration  Physical Activity and Function: nutrition-related ADLs and IADLs  Anthropometric Measurements: weight, weight change  Biochemical Data, Medical Tests and Procedures: inflammatory profile, lipid profile, glucose/endocrine profile, gastrointestinal profile, electrolyte and renal panel  Nutrition-Focused Physical Findings: overall appearance     Nutrition Follow-Up    RD Follow-up?: Yes

## 2024-11-22 NOTE — ASSESSMENT & PLAN NOTE
Patient with Hypoxic Respiratory failure which is Acute.  he is not on home oxygen. Supplemental oxygen was provided and noted- Vent Mode: A/C  Oxygen Concentration (%):  [40] 40  Resp Rate Total:  [22 br/min-29 br/min] 25 br/min  Vt Set:  [420 mL] 420 mL  PEEP/CPAP:  [5 cmH20] 5 cmH20  Mean Airway Pressure:  [11 cmH20-15 cmH20] 15 cmH20    .   Signs/symptoms of respiratory failure include- respiratory distress, lethargy, and cyanosis. Contributing diagnoses includes - Aspiration Labs and images were reviewed. Patient Has recent ABG, which has been reviewed. Will treat underlying causes and adjust management of respiratory failure as follows:    Patient was stable on Vapotherm at OSH with 50% FiO2; thought to be 2/2 aspiration pneumonia. CXR with concern for pulmonary edema. Echo 11/18 with normal diastolic function. Likely vascular congestion 2/2 portal hypertension. Intubated for airway protection. Appears to have severe pulmonary edema.    - Trial of Lasix on 11/22, with poor urine output.   --Vent bundle in place  --Wean FiO2 for SpO2>92%  --Broad-spectrum antibiotics, Vanc/Zosyn  - Continue low tidal volume lung protective ventilation.

## 2024-11-22 NOTE — PROCEDURES
"Marcellus Mendoza is a 39 y.o. male patient.    Temp: 99 °F (37.2 °C) (11/21/24 1500)  Pulse: 91 (11/21/24 1600)  Resp: (!) 24 (11/21/24 1600)  BP: (!) 110/53 (11/21/24 1600)  SpO2: 96 % (11/21/24 1600)  Weight: (!) 167.2 kg (368 lb 9.8 oz) (11/21/24 0950)  Height: 5' 10" (177.8 cm) (11/21/24 0950)       Central Line    Date/Time: 11/21/2024 6:20 PM    Performed by: Geni Peacock MD  Authorized by: Geni Peacock MD    Location procedure was performed:  [facility] MEDICAL ICU (MICU)  Pre-operative diagnosis:  Hepatorenal syndrome  Post-operative diagnosis:  Hepatorenal syndrome  Consent Done ?:  Yes  Time out complete?: Verified correct patient, procedure, equipment, staff, and site/side    Indications:  Med administration  Anesthesia:  General anesthesia  Preparation:  Skin prepped with ChloraPrep  Skin prep agent dried: Skin prep agent completely dried prior to procedure    Sterile barriers: All five maximal sterile barriers used - gloves, gown, cap, mask and large sterile sheet    Hand hygiene: Hand hygiene performed immediately prior to central venous catheter insertion    Location:  Right internal jugular  Catheter type:  Trialysis  Catheter size:  8.5 Fr  Ultrasound guidance: Yes    Vessel Caliber:  Large   patent  Comprressibility:  Normal  Needle advanced into vessel with real time ultrasound guidance.    Guidewire confirmed in vessel.    Steril sheath on probe.    Sterile gel used.  Manometry: Yes    Number of attempts:  1  Securement:  Line sutured, chlorhexidine patch, sterile dressing applied and blood return through all ports  Complications: No    Estimated blood loss (mL):  5  Specimens: No    Implants: No    Guidewire: guidewire removed intact    Adverse Events:  NoneTermination Site: superior vena cava      11/21/2024    "

## 2024-11-22 NOTE — PROCEDURES
"Marcellus Mendoza is a 39 y.o. male patient.    Temp: 99.6 °F (37.6 °C) (24)  Pulse: (!) 113 (24)  Resp: (!) 25 (24)  BP: (!) 142/63 (245)  SpO2: (!) 94 % (24)  Weight: (!) 167.2 kg (368 lb 9.8 oz) (24 0950)  Height: 5' 10" (177.8 cm) (24 0950)       Arterial Line    Date/Time: 2024 11:49 PM  Location procedure was performed: [facility] CRITICAL CARE MEDICINE    Performed by: Gifty Coleman MD  Authorized by: Gifty Coleman MD  Pre-op Diagnosis: hepatorenal syndrome  Post-operative diagnosis: hepatorenal syndrome  Consent Done: Yes  Consent: Written consent obtained.  Risks and benefits: risks, benefits and alternatives were discussed  Consent given by: parent  Required items: required blood products, implants, devices, and special equipment available  Patient identity confirmed: , name and MRN  Time out: Immediately prior to procedure a "time out" was called to verify the correct patient, procedure, equipment, support staff and site/side marked as required.  Preparation: Patient was prepped and draped in the usual sterile fashion.  Indications: hemodynamic monitoring  Location: left radial    Patient sedated: yes (see MAR for details, on sedation while on mechanical ventilation)  Sedation type: deep sedation    Needle gauge: 20  Seldinger technique: Seldinger technique used  Number of attempts: 1  Post-procedure: line sutured and dressing applied  Post-procedure CMS: normal  Patient tolerance: Patient tolerated the procedure well with no immediate complications  Comments: Right radial arterial line moved out of radial artery and was no longer working. Replaced with left radial arterial line.           2024    "

## 2024-11-22 NOTE — ASSESSMENT & PLAN NOTE
40 y/o m, h/o obesity, PBC, newly diagnosed cirrhosis in Feb 2024 likely related to both PBC and alcohol use disorder, now admitted with fulminant liver failure, multiorgan failure, shock.    Advance Care Planning     Medicolegal  Decision-making:   -Pt does not have decision-making capacity 2/2 intubation/sedation  -Pt has not appointed a HCPOA.  Legal surrogate decision makers would be patient's parents, Billie Faithelo and Liu Mendoza.    -Marital status: single  -Children: none    Advance care planning/Advance directives:  -The patient has not previously engaged in advance care planning  -Pt has no scanned advance directives in [facility]    Psychosocial  Support system:  -Spiritual: not assessed today;  The palliative care  will be consulted to assess the patient.  -Family: involved and supportive.  Pt was living with his sister and her boyfriend prior to this hospitalization.      Health status prior to this hospitalization  -Functional status: good.  Pt was able to manage ADLs  -Cognitive status: intact   -QOL: good    Prognosis:  -Time and potential for recovery:  Given worsening renal function and escalating pressor requirements, believe that patient is actively dying now from his liver failure and is at the end of life.    [facility] Discussion with pt's mother, father and stepmother:  11/22/24  -I updated the patient's family on his worsening clinical condition.  They have already been following along in the chart and had seen his kidney function worsening and had an idea of what this would mean for the patient.  We discussed next steps and what various approaches could look like.  I strongly recommended against initiating hemodialysis given that this would be a bridge to nowhere for this patient and they are in agreement.  We also discussed that the patient seems to be approaching the end of his life no matter what we do.  At this point, it is really more a question of what this time looks like.  Family would  "like the opportunity for others to come in and see the patient over the next 24 hours.  Mother is coming in this afternoon and other families are coming by tomorrow.  After everybody has had an opportunity to say goodbye, a tentative plan would be to then transition patient off of life-sustaining treatments including the ventilator.  Family asked questions about what his prognosis would be in that situation and I suggested likely hours to days.  In the meantime we are all in agreement that if his condition were to worsen, we would not escalate care further and in particular, when his heart stops we are not going to intervene and instead ensure that he has a natural and peaceful death.  Everyone aware that while I suspect that patient will live through the weekend so that everyone has their opportunity to say goodbye, his condition is unpredictable and he could surprise us and pass away sooner.  11/21/24  -I met with the pt's family at bedside.  They were able to provide lots of helpful information about Isidoro's life.  He was described as "one of the sweetest, kindest people" in the world.  He has protected his family from much of the information about his illness as he has wanted to protect them from the bad news and hasn't want to burden anyone.  Isidoro was a young man with lots of potential, athletic, did well in school and went through a difficult break-up at age 23 and family describes him falling apart at this point and they feel that this is when he started on the trajectory that led him to this point today.  His community narrowed considerably to family during this timeframe.  He and his mother have remained very close - speaking multiple times/day - but his interaction with other family and friends has been more limited.      Family struggling with feelings of guilt - feeling that there's something they could have done to intervene as pt's weight and drinking worsened.  I assured them that addiction is a very " difficult disease to treat and that it is almost impossible to force a patient who is unwilling into therapy and rehab.      Family has a very good understanding of the clinical details of Will's case.  They seem relatively accepting of the news that he is unlikely to be a transplant candidate.  They have received wildly different information from doctors at the prior hospital and here about patient's prognosis so there is some frustration and confusion surrounding that issue.  They asked a lot of questions about patient's chances for recovery, next steps if he does not improve, what kind of timeframe we could be looking at in terms of prognosis.  We discussed the issue of dialysis and I told them that I would not recommend initiating dialysis should his kidney function continued to worsen, given that it would be secondary to his underlying liver disease and a bridge to know where.   They also asked questions about palliative extubation and I described at length what that process looks like including enrollment in hospice care.  Family receptive to this discussion.    All are in agreement that they would not want to see him suffer at the end of life.  At the same time, everybody is wondering whether there is any potential at all for patient to make any improvement at this stage of his illness before re-focusing exclusively on comfort-focused treatment.  We briefly discussed the issue of code status as well.  I made a strong recommendation against intervening should patient's heart stopped during his time in the ICU.  Family going to talk this over.    Active symptoms  None at this time    Summary/Recommendation:  -Most important goals at this time:  Giving family an opportunity to visit with the patient and say their goodbyes.  No further prolongation of dying process once everybody has had the opportunity to visit with the patient  -No further escalation of care planned and patient now DNR  -Patient is Evangelical,  spiritual needs have already been met  -Please liberalize visiting rules over the next day so that all family members can have the opportunity to be with him  -Most appropriate disposition:  Continue with the current level of care but do not add additional interventions should his condition deteriorate.

## 2024-11-22 NOTE — SUBJECTIVE & OBJECTIVE
Interval history:  -worsening renal failure and urine output.  -escalating pressor requirements    Past Medical History:   Diagnosis Date    Alcohol abuse     Anasarca     GERD (gastroesophageal reflux disease)     Hypertension     Obesity, unspecified     Unspecified cirrhosis of liver     Vitamin D deficiency        Past Surgical History:   Procedure Laterality Date    ORIF, FRACTURE, ANKLE, BIMALLEOLAR Right        Review of patient's allergies indicates:  No Known Allergies    Medications:  Continuous Infusions:   dexmedeTOMIDine (Precedex) infusion (titrating)  0-1.4 mcg/kg/hr Intravenous Continuous        fentanyl  0-300 mcg/hr Intravenous Continuous 10 mL/hr at 11/22/24 1100 100 mcg/hr at 11/22/24 1100    NORepinephrine bitartrate-D5W  0-3 mcg/kg/min Intravenous Continuous 37.6 mL/hr at 11/22/24 1100 0.48 mcg/kg/min at 11/22/24 1100    propofoL  0-50 mcg/kg/min Intravenous Continuous 10 mL/hr at 11/22/24 1100 10 mcg/kg/min at 11/22/24 1100    vasopressin  0.04 Units/min Intravenous Continuous         Scheduled Meds:   chlorhexidine  15 mL Mouth/Throat BID    heparin (porcine)  7,500 Units Subcutaneous Q8H    lactulose  20 g Per OG tube Q8H    mupirocin   Nasal BID    pantoprazole  40 mg Intravenous Daily    phytonadione vitamin k (AQUA-MEPHYTON) 10 mg in D5W 50 mL IVPB  10 mg Intravenous Daily    piperacillin-tazobactam (Zosyn) IV (PEDS and ADULTS) (extended infusion is not appropriate)  4.5 g Intravenous Q8H    rifAXImin  550 mg Per OG tube BID    ursodiol  600 mg Per OG tube TID     PRN Meds:  Current Facility-Administered Medications:     fentanyl, 50 mcg, Intravenous, Q1H PRN    sodium chloride 0.9%, 10 mL, Intravenous, PRN    Pharmacy to dose Vancomycin consult, , , Once **AND** vancomycin - pharmacy to dose, , Intravenous, pharmacy to manage frequency    Family History       Problem Relation (Age of Onset)    Breast cancer Mother    Diabetes Father          Tobacco Use    Smoking status: Former      Current packs/day: 0.00     Types: Cigarettes     Quit date: 2023     Years since quittin.3    Smokeless tobacco: Never   Substance and Sexual Activity    Alcohol use: Not Currently     Alcohol/week: 5.0 standard drinks of alcohol     Types: 5 Drinks containing 0.5 oz of alcohol per week     Comment: reports last drink     Drug use: Not Currently    Sexual activity: Yes       Review of Systems  Objective:     Vital Signs (Most Recent):  Temp: 98.5 °F (36.9 °C) (24 0730)  Pulse: (!) 113 (24 1000)  Resp: (!) 24 (24 1000)  BP: 129/60 (24 2300)  SpO2: (!) 94 % (24 1000) Vital Signs (24h Range):  Temp:  [98.5 °F (36.9 °C)-99.6 °F (37.6 °C)] 98.5 °F (36.9 °C)  Pulse:  [] 113  Resp:  [20-38] 24  SpO2:  [89 %-100 %] 94 %  BP: (102-156)/(49-66) 129/60  Arterial Line BP: (115-176)/(39-57) 126/39     Weight: (!) 167.2 kg (368 lb 9.8 oz)  Body mass index is 52.89 kg/m².       Physical Exam  Vitals and nursing note reviewed.   Constitutional:       Appearance: He is morbidly obese. He is ill-appearing.      Interventions: He is sedated and intubated.   Eyes:      General: Scleral icterus present.   Pulmonary:      Effort: He is intubated.      Comments: On ventilator  Musculoskeletal:         General: Swelling present.   Skin:     Coloration: Skin is jaundiced.      Comments: jaundiced   Neurological:      Comments: Sedated, unresponsive to verbal stimuli            Review of Symptoms      Symptom Assessment (ESAS 0-10 Scale)  Unable to complete assessment due to Intubated         Pain Assessment in Advanced Demential Scale (PAINAD)   Breathing - Independent of vocalization:  0  Negative vocalization:  0  Facial expression:  0  Body language:  0  Consolability:  0  Total:  0    Living Arrangements:  Lives with family    Psychosocial/Cultural:   See Palliative Psychosocial Note: Yes  **Primary  to Follow**  Palliative Care  Consult:  Yes        Advance Care Planning   Advance Directives:   Living Will: No    LaPOST: No    Do Not Resuscitate Status: No    Medical Power of : No      Decision Making:  Patient unable to communicate due to disease severity/cognitive impairment and Family answered questions  Goals of Care: Still being determined         CBC:   Recent Labs   Lab 11/22/24 0223   WBC 17.13*   HGB 9.9*   HCT 28.6*   MCV 94        BMP:  Recent Labs   Lab 11/22/24 0223   GLU 83   *   K 3.8   CL 83*   CO2 23   BUN 32*   CREATININE 3.6*   CALCIUM 8.5*   MG 2.1     LFT:  Lab Results   Component Value Date     (H) 11/22/2024    ALKPHOS 204 (H) 11/22/2024    BILITOT 22.7 (H) 11/22/2024     Albumin:   Albumin   Date Value Ref Range Status   11/22/2024 2.3 (L) 3.5 - 5.2 g/dL Final     Protein:   Total Protein   Date Value Ref Range Status   11/22/2024 7.2 6.0 - 8.4 g/dL Final     Lactic acid:   Lab Results   Component Value Date    LACTATE 1.0 11/20/2024    LACTATE 1.4 11/19/2024

## 2024-11-22 NOTE — PLAN OF CARE
MICU DAILY GOALS     Family/Goals of care/Code Status   Code Status: DNR    24H Vital Sign Range  Temp:  [98.5 °F (36.9 °C)-99.6 °F (37.6 °C)]   Pulse:  []   Resp:  [20-33]   BP: (114-156)/(54-66)   SpO2:  [89 %-100 %]   Arterial Line BP: (115-176)/(34-57)      Shift Events (include procedures and significant events)   Pt became DNR today, weaned off Propofol to Precedex. Plan to withdraw care and keep measures to maintain hemodynamic stability until family has come say their goodbyes.      AWAKE RASS: Goal - RASS Goal: -2-->light sedation  Actual - RASS (Roca Agitation-Sedation Scale): drowsy    Restraint necessity: Removing medical devices   BREATHE SBT: Not attempted    Coordinate A & B, analgesics/sedatives Pain: managed   SAT: Not attempted   Delirium CAM-ICU: Overall CAM-ICU: Positive   Early(intubated/ Progressive (non-intubated) Mobility MOVE Screen (INTUBATED ONLY): Not attempted    Activity: Activity Management: Rolling - L1   Feeding/Nutrition Diet order: Diet/Nutrition Received: NPO,     Thrombus DVT prophylaxis: VTE Core Measure: Pharmacological prophylaxis initiated/maintained   HOB Elevation Head of Bed (HOB) Positioning: HOB at 30-45 degrees   Ulcer Prophylaxis GI: yes   Glucose control managed     Skin Skin assessment:     Sacrum intact/not altered? No  Heels intact/not altered? No  Surgical wound? No    CHECK ONE!   (no altered skin or altered skin) and sub boxes:  [x] No Altered Skin Integrity Present    []Prevention Measures Documented    [] Altered Skin Integrity Present or Discovered   [] LDA present in EPIC, daily doc completed              [] LDA added if not in EPIC (describe wound).                    When describing wound, do not stage, use descriptive words only.    [] Wound Image Taken (required on admit,                   transfer/discharge and every Tuesday)    Wound Care Consulted? No   Bowel Function no issues    Indwelling Catheter Necessity      Urethral Catheter 11/19/24  1847 16 Fr.-Reason for Continuing Urinary Catheterization: Critically ill in ICU and requiring hourly monitoring of intake/output    Trialysis (Dialysis) Catheter 11/21/24 1806 right internal jugular-Line Necessity Review: CRRT/HD, Medication caustic to vasculature   De-escalation Antibiotics Yes        VS and assessment per flow sheet, patient progressing towards goals as tolerated, plan of care reviewed with family, all concerns addressed, will continue w/ POC.

## 2024-11-22 NOTE — ASSESSMENT & PLAN NOTE
Evaluated by hepatology at [facility]. 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 Transferred here for liver transplant evaluation.  MELD 3.0: 39 at 11/22/2024  2:23 AM  MELD-Na: 39 at 11/22/2024  2:23 AM  Calculated from:  Serum Creatinine: 3.6 mg/dL (Using max of 3 mg/dL) at 11/22/2024  2:23 AM  Serum Sodium: 122 mmol/L (Using min of 125 mmol/L) at 11/22/2024  2:23 AM  Total Bilirubin: 22.7 mg/dL at 11/22/2024  2:23 AM  Serum Albumin: 2.3 g/dL at 11/22/2024  2:23 AM  INR(ratio): 2 at 11/22/2024  2:23 AM  Age at listing (hypothetical): 39 years  Sex: Male at 11/22/2024  2:23 AM     --Hepatology reported unfortunately not a candidate for liver transplantation at this time given recurrent alcohol use despite knowledge of liver disease  --Lactulose; rifaximin  --Liver US w/ doppler with hepatomegaly and signs of portal hypertension including reversed flow in the right anterior portal vein,   -- LFTS worsening  -- Family opted for no further escalation of care today. Family plans to try to visit with him tomorrow and then consider termininal extubation

## 2024-11-22 NOTE — SUBJECTIVE & OBJECTIVE
Interval History/Significant Events: Overnight, right wrist arterial line fell out and was replaced. Patient was requiring increased amount of norepinephrine and vasopressin to try to meet MAP goal >85. Unable to real MAP goal and decision was made to decrease to goal of systolic  this morning. Palliative care met with parents this morning and decision was made to transition to DNR and prepare to transition to comfort care.     Review of Systems   Unable to perform ROS: Intubated     Objective:     Vital Signs (Most Recent):  Temp: 99.2 °F (37.3 °C) (11/22/24 1500)  Pulse: 103 (11/22/24 1522)  Resp: (!) 24 (11/22/24 1522)  BP: 129/60 (11/21/24 2300)  SpO2: (!) 93 % (11/22/24 1522) Vital Signs (24h Range):  Temp:  [98.5 °F (36.9 °C)-99.6 °F (37.6 °C)] 99.2 °F (37.3 °C)  Pulse:  [] 103  Resp:  [20-33] 24  SpO2:  [89 %-100 %] 93 %  BP: (114-156)/(54-66) 129/60  Arterial Line BP: (115-176)/(34-57) 136/37   Weight: (!) 167.2 kg (368 lb 9.8 oz)  Body mass index is 52.89 kg/m².      Intake/Output Summary (Last 24 hours) at 11/22/2024 1620  Last data filed at 11/22/2024 1500  Gross per 24 hour   Intake 3004.97 ml   Output 615 ml   Net 2389.97 ml          Physical Exam  Constitutional:       Appearance: He is ill-appearing.      Interventions: He is sedated and intubated.   HENT:      Head: Normocephalic and atraumatic.   Cardiovascular:      Rate and Rhythm: Regular rhythm. Tachycardia present.      Heart sounds: S1 normal and S2 normal. Murmur heard.   Pulmonary:      Effort: He is intubated.      Breath sounds: No decreased breath sounds, wheezing, rhonchi or rales.   Abdominal:      General: Abdomen is protuberant.      Palpations: Abdomen is soft.   Musculoskeletal:      Cervical back: Neck supple.      Right lower leg: Edema present.      Left lower leg: Edema present.      Comments: Significant body wall edema.   Skin:     Coloration: Skin is jaundiced.   Neurological:      Comments: Opens eyes; does not  follow commands            Vents:  Vent Mode: A/C (11/22/24 1522)  Ventilator Initiated: Yes (11/21/24 0435)  Set Rate: 22 BPM (11/22/24 1522)  Vt Set: 420 mL (11/22/24 1522)  PEEP/CPAP: 5 cmH20 (11/22/24 1522)  Oxygen Concentration (%): 40 (11/22/24 1522)  Peak Airway Pressure: 31 cmH20 (11/22/24 1522)  Plateau Pressure: 0 cmH20 (11/22/24 1522)  Total Ve: 10.9 L/m (11/22/24 1522)  Negative Inspiratory Force (cm H2O): 0 (11/22/24 1522)  F/VT Ratio<105 (RSBI): (!) 54.55 (11/22/24 1522)  Lines/Drains/Airways       Central Venous Catheter Line  Duration             Trialysis (Dialysis) Catheter 11/21/24 1806 right internal jugular <1 day              Drain  Duration                  Urethral Catheter 11/19/24 1847 16 Fr. 2 days         NG/OG Tube 11/21/24 0130 Emmons sump 16 Fr. Right mouth 1 day              Airway  Duration                  Airway - Non-Surgical 11/21/24 0120 Endotracheal Tube 1 day              Arterial Line  Duration             Arterial Line 11/21/24 2250 Left Radial <1 day              Peripheral Intravenous Line  Duration                  Midline Catheter - Single Lumen 11/20/24 1152 Right cephalic vein (lateral side of arm) 20g x 8cm 2 days         Peripheral IV - Single Lumen 11/20/24 0420 20 G Anterior;Right Forearm 2 days         Peripheral IV - Single Lumen 11/21/24 0547 20 G No Right Hand 1 day         Peripheral IV - Single Lumen 11/21/24 0549 18 G Left Hand 1 day                  Significant Labs:    CBC/Anemia Profile:  Recent Labs   Lab 11/20/24  1826 11/21/24  0910 11/22/24 0223   WBC  --  10.77 17.13*   HGB  --  8.9* 9.9*   HCT 31* 24.4* 28.6*   PLT  --  153 188   MCV  --  91 94   RDW  --  27.0* 26.6*        Chemistries:  Recent Labs   Lab 11/21/24  1052 11/22/24 0223   * 122*   K 3.4* 3.8   CL 83* 83*   CO2 24 23   BUN 28* 32*   CREATININE 2.6* 3.6*   CALCIUM 8.4* 8.5*   ALBUMIN 2.1* 2.3*   PROT 6.5 7.2   BILITOT 24.3* 22.7*   ALKPHOS 187* 204*   ALT 63* 104*   * 314*    MG 2.1 2.1   PHOS 3.7 4.8*       All pertinent labs within the past 24 hours have been reviewed.    Significant Imaging:  I have reviewed all pertinent imaging results/findings within the past 24 hours.

## 2024-11-22 NOTE — HOSPITAL COURSE
Patient transferred to [facility] for evaluation by liver transplant team. Maintained on vent and requiring pressors. Nephrology, Hepatology and Palliative care were consulted. Hepatology reported that he was unfortunately not a candidate for liver transplantation given recurrent alcohol use despite knowledge of liver disease, and barriers to transplantation including BMI. Nephrology suspected HRS and recommended MAP goal >85, but unable to meet goal despite increasing pressors. Patient did not respond well to lasix trial with poor urine output and worsening JOHAN. On , palliative care team discussed condition in depth with parents who are his legal decision makers and decided to make patient DNR. On , family decided to transition to comfort and opted to discontinue pressors. Family at beside with patient. Patient  at 1339 on 24 after discontinuation of pressors.

## 2024-11-22 NOTE — PLAN OF CARE
Patient seen with housestaff team on morning rounds and then plan of care was discussed in detail.     39 year old man transferred to [facility] for liver transplant evaluation.     Acute on chronic liver failure in the setting of PBC and EtOH cirrhosis.  Hepatology following.  Unlikely candidate for transplant.  MELD=39. Continue lactulose and rifaximin.  Acute hypoxemic respiratory failure 2/2 pulmonary edema: patient remains intubated.  Continue low tidal volume lung protective ventilation.   JOHAN 2/2 HRS: patient has not responded to elevation in MAP.  Urine output remains poor.  He has required significant vasopressor support to achieve this goal.  Circulatory failure: 2/2 liver disease.  Continue vaso and norepi to maintain a SBP around 100.  Palliative care assistance appreciated.  After extensive conversation, family has decided against any escalation of care.  Family gathering and preparing for a transition to comfort care.       Critical Care Time: 30 minutes  Critical care was time spent personally by me on the following activities: evaluating this patient's organ dysfunction, development of treatment plan, discussing treatment plan with patient or surrogate and bedside caregivers, discussions with consultants, evaluation of patient's response to treatment, examination of patient, ordering and performing treatments and interventions, ordering and review of laboratory studies, ordering and review of radiographic studies, re-evaluation of patient's condition. This critical care time did not overlap with that of any other provider or involve time for any procedures.

## 2024-11-22 NOTE — PROCEDURES
"Marcellus Mendoza is a 39 y.o. male patient.    Temp: 99 °F (37.2 °C) (11/21/24 1500)  Pulse: 91 (11/21/24 1600)  Resp: (!) 24 (11/21/24 1600)  BP: (!) 110/53 (11/21/24 1600)  SpO2: 96 % (11/21/24 1600)  Weight: (!) 167.2 kg (368 lb 9.8 oz) (11/21/24 0950)  Height: 5' 10" (177.8 cm) (11/21/24 0950)       Arterial Line    Date/Time: 11/21/2024 6:13 PM  Location procedure was performed: [facility] CRITICAL CARE MEDICINE    Performed by: Rebekah Rollins MD  Authorized by: Rebekah Rollins MD  Assisting provider: Mark Austin MD  Pre-op Diagnosis: hepatorenal syndrome  Post-operative diagnosis: Same  Consent Done: Yes  Consent: Written consent obtained.  Risks and benefits: risks, benefits and alternatives were discussed  Consent given by: parent  Patient understanding: patient states understanding of the procedure being performed  Patient consent: the patient's understanding of the procedure matches consent given  Procedure consent: procedure consent matches procedure scheduled  Relevant documents: relevant documents present and verified  Test results: test results available and properly labeled  Site marked: the operative site was marked  Imaging studies: imaging studies available  Required items: required blood products, implants, devices, and special equipment available  Patient identity confirmed: MRN and name  Time out: Immediately prior to procedure a "time out" was called to verify the correct patient, procedure, equipment, support staff and site/side marked as required.  Preparation: Patient was prepped and draped in the usual sterile fashion.  Indications: hemodynamic monitoring  Location: right radial    Patient sedated: yes  Sedatives: see MAR for details  Description of findings: R radial artery .75 cm deep   Needle gauge: 20  Seldinger technique: Seldinger technique used  Number of attempts: 1  Significant surgical tasks conducted by the assistant(s): positioned the arm  Complications: " No  Estimated blood loss (mL): 2  Specimens: No  Implants: No  Post-procedure: line sutured and dressing applied  Post-procedure CMS: normal  Patient tolerance: Patient tolerated the procedure well with no immediate complications        Rebekah Rollins MD  Anesthesiology/Internal Medicine PGY-1  [facility]    11/21/2024

## 2024-11-22 NOTE — ASSESSMENT & PLAN NOTE
Worsening JOHAN in setting of HRS. Cr 3.6 today.  Cr was normal on initial presentation to OSH. Notable hyponatremia; anasarca.  Gave trial of IV lasix 120mg x1 on 11/21. Pt had minimal urine output (640cc).     - Nephrology consulted, reported severe HRS. Family does not wish to initiate dialysis.   --Maintain de la cruz  --Avoid nephrotoxic agents

## 2024-11-22 NOTE — PROGRESS NOTES
Hepatology Progress Note    Marcellus Mendoza is a 39 y.o. 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     Interval History    Increasing pressor requirements. MELD 39.     Objective  Temp:  [98.5 °F (36.9 °C)-99.6 °F (37.6 °C)] 99 °F (37.2 °C) (11/22 1100)  Pulse:  [] 104 (11/22 1300)  BP: (110-156)/(53-66) 129/60 (11/21 2300)  Resp:  [20-33] 22 (11/22 1300)  SpO2:  [89 %-100 %] 93 % (11/22 1300)  Arterial Line BP: (115-176)/(34-57) 121/37 (11/22 1300)    Laboratory    Lab Results   Component Value Date    WBC 17.13 (H) 11/22/2024    HGB 9.9 (L) 11/22/2024    HCT 28.6 (L) 11/22/2024    MCV 94 11/22/2024     11/22/2024       Lab Results   Component Value Date     (L) 11/22/2024    K 3.8 11/22/2024    CL 83 (L) 11/22/2024    CO2 23 11/22/2024    BUN 32 (H) 11/22/2024    CREATININE 3.6 (H) 11/22/2024    CALCIUM 8.5 (L) 11/22/2024       Lab Results   Component Value Date    ALBUMIN 2.3 (L) 11/22/2024     (H) 11/22/2024     (H) 11/22/2024    ALKPHOS 204 (H) 11/22/2024    BILITOT 22.7 (H) 11/22/2024       Lab Results   Component Value Date    INR 2.0 (H) 11/22/2024    INR 1.9 (H) 11/21/2024    INR 1.8 (H) 11/20/2024       MELD 3.0: 39 at 11/22/2024  2:23 AM  MELD-Na: 39 at 11/22/2024  2:23 AM  Calculated from:  Serum Creatinine: 3.6 mg/dL (Using max of 3 mg/dL) at 11/22/2024  2:23 AM  Serum Sodium: 122 mmol/L (Using min of 125 mmol/L) at 11/22/2024  2:23 AM  Total Bilirubin: 22.7 mg/dL at 11/22/2024  2:23 AM  Serum Albumin: 2.3 g/dL at 11/22/2024  2:23 AM  INR(ratio): 2 at 11/22/2024  2:23 AM  Age at listing (hypothetical): 39 years  Sex: Male at 11/22/2024  2:23 AM      Assessment    Marcellus Mendoza is a 39 y.o. male with history of alcohol use disorder, alcoholic cirrhosis, PBC and severe obesity who presented initially with anasarca and SOB and was admitted for decompensated cirrhosis. Course has been  complicated by worsening mental and respiratory status, now intubated. Transferred to [facility] [place] liver transplant evaluation.     Appears that patient was lost to follow-up for some reasons out of his immediate control; was unable to establish with hepatologist and unable to afford/refill his prescriptions. Reportedly, he admitted to Hepatology at [facility] that he was recently drinking again prior to presentation, though family were not aware if he may have restarted drinking. His current pattern of AST:ALT derangement is suggestive of alcohol related injury. That said, patient is unfortunately unable to participate in discussion and so cannot continue with transplant evaluation until he is able to come off of mechanical ventilation.     Problem List:  Decompensated cirrhosis  Hepatic encephalopathy  Elevated LFTs  Acute Renal Failure   PBC  Acute respiratory failure secondary to aspiration requiring intubation  Severe obesity with BMI > 50  Hx of alcohol use disorder, unclear if active  Shock       Recommendations:  - Discussed poor prognosis at bedside with family with worsening renal function and pressor requirements. They do not wish to pursue dialysis after discussing with other team members. Appreciate excellent care as provided by the ICU and palliative care teams. They plan to transition to comfort care after allowing additional family members a chance to visit. His parent's share the patient's story and relay to the liver team that a heart break from his 20's precipitated a decline and that he never recovered from that loss. We expressed our gratitude in sharing intimate details about his life with us and for allowing us the chance to participate in caring for him.   - We will sign off at this time.     Thank you for involving us in the care of Marcellus Mendoza. Please call with any additional concerns or questions.    Avelina Thornton DO   Gastroenterology Fellow PGY- V  [facility]

## 2024-11-22 NOTE — PROGRESS NOTES
[facility]  Initial Discharge Assessment       Primary Care Provider: Katlin, Primary Doctor    Admission Diagnosis: Alcoholic cirrhosis [K70.30]    Admission Date: 11/21/2024  Expected Discharge Date: 11/28/2024         Payor: BLUE CROSS BLUE SHIELD / Plan: BCBS ALL OUT OF STATE / Product Type: PPO /     Extended Emergency Contact Information  Primary Emergency Contact: Billie Rosa  Mobile Phone: [phone]  Relation: Mother  Secondary Emergency Contact: burton reeves  Mobile Phone: [phone]  Relation: Step parent  Preferred language: English   needed? No  Father: erin reeves  Mobile Phone: [phone]    Discharge Plan A: Comfort care/withdrawal       No Pharmacies Listed    Initial Assessment (most recent)       Adult Discharge Assessment - 11/22/24 1045          Discharge Assessment    Assessment Type Discharge Planning Assessment     Source of Information family     If unable to respond/provide information was family/caregiver contacted? Yes     Contact Name/Number Rosalind Hatfield [phone] and Yessy Damon [phone]     Reason For Admission Hepatic cirrhosis     People in Home --   Sister and her s/o    Do you expect to return to your current living situation? No     Prior to hospitilization cognitive status: Alert/Oriented     Current cognitive status: Coma/Sedated/Intubated     Walking or Climbing Stairs Difficulty no     Dressing/Bathing Difficulty no     Home Layout Bedroom on 2nd floor     Do you have prescription coverage? Yes     Coverage Blue Cross Blue Shield     Discharge Plan A Comfort care/withdrawal        Alcohol Use    Q1: How often do you have a drink containing alcohol? Patient unable to answer   Per parents, pt drinks.                       CM spoke to daniela Hatfield and step mom at bedside with permission.  Role of CM as part of the interdisciplinary team explained. Pt is intubated and unable to participate.  Initial assessment complete.  Family informed this writer  that only a few minutes prior they spoke to the Palliative provider and understand pt's condition vs chances of recovery. Family anticipates that pt will not live. They are awaiting the arrival of pt's mother Billie before making any final decisions regarding comfort care.  Mother herself is very ill but will come to hospital once strong enough.  PCP: Dr. Rodarte, [place].  Pharmacy [facility] [address] in [place].  CM will follow for needs.     Edis Garaz RN CM  11/22/2024

## 2024-11-22 NOTE — CARE UPDATE
Patient now DNR. Worsening renal function secondary to severe HRS. Discussed with primary team, at this point patient's family has elected not to escalate care. They do not wish to initiate dialysis. Palliative on board, plan for patient to be transitioned off of life sustaining treatments after family has had chance to visit patient over the next 24 hours. No further recommendations from nephrology standpoint. Nephrology will sign off at this time. Thank you for involving us in the care of Mr. Marcellus Mendoza. Please re-consult for any further needs.     Sarah Walker, PA-C [facility]

## 2024-11-22 NOTE — PROGRESS NOTES
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 Mendoza is a 39-year-old male with past medical history of primary biliary cholangitis with cirrhosis, ETOH abuse, anasarca, GERD, HTN, morbid obesity, vitamin D deficiency who initially presented to [facility] with dyspnea and concern for acute heart failure exacerbation and decompensated cirrhosis. He was transferred to [facility] for higher level of care on 11/17 and now to [facility] for liver transplant evaluation. While at [facility], noted to be in ETOH withdrawal complicated by respiratory failure 2/2 aspiration. He was stable on Vapotherm but with worsening mental status- unable to tolerate bipap with concern for airway. He was intubated for airway protection prior to transport to [facility].      Transferred to [facility] 11/21 for liver transplant evaluation with decompensated cirrhosis, heart failure with subsequent respiratory failure and ETOH withdrawal.    Hospital/ICU Course:  Patient transferred to [facility] for evaluation by liver transplant team. Maintained on vent and requiring pressors. Nephrology, Hepatology and Palliative care were consulted. Hepatology reported that he is unfortunately not a candidate for liver transplantation given recurrent alcohol use despite knowledge of liver disease, and barriers to transplantation including BMI. Nephrology suspected HRS with JOHAN, urine sodium < 20, and hypotension. Recommended MAP goal >85, but unable to obtain with consistent increase in pressors. Patient did not respond well to lasix with poor urine output and worsening JOHAN. Palliative discussed with parents who are legal  decision makers and decided to make patient DNR on 11/22. Plan for terminal extubation tomorrow after family members can come visit. Discontinued precedex and maintaining goal . No further escalation of care    Interval History/Significant Events: Overnight, right wrist arterial line fell out and was replaced. Patient was requiring increased amount of norepinephrine and vasopressin to try to meet MAP goal >85. Unable to real MAP goal and decision was made to decrease to goal of systolic  this morning. Palliative care met with parents this morning and decision was made to transition to DNR and prepare to transition to comfort care.     Review of Systems   Unable to perform ROS: Intubated     Objective:     Vital Signs (Most Recent):  Temp: 99.2 °F (37.3 °C) (11/22/24 1500)  Pulse: 103 (11/22/24 1522)  Resp: (!) 24 (11/22/24 1522)  BP: 129/60 (11/21/24 2300)  SpO2: (!) 93 % (11/22/24 1522) Vital Signs (24h Range):  Temp:  [98.5 °F (36.9 °C)-99.6 °F (37.6 °C)] 99.2 °F (37.3 °C)  Pulse:  [] 103  Resp:  [20-33] 24  SpO2:  [89 %-100 %] 93 %  BP: (114-156)/(54-66) 129/60  Arterial Line BP: (115-176)/(34-57) 136/37   Weight: (!) 167.2 kg (368 lb 9.8 oz)  Body mass index is 52.89 kg/m².      Intake/Output Summary (Last 24 hours) at 11/22/2024 1620  Last data filed at 11/22/2024 1500  Gross per 24 hour   Intake 3004.97 ml   Output 615 ml   Net 2389.97 ml          Physical Exam  Constitutional:       Appearance: He is ill-appearing.      Interventions: He is sedated and intubated.   HENT:      Head: Normocephalic and atraumatic.   Cardiovascular:      Rate and Rhythm: Regular rhythm. Tachycardia present.      Heart sounds: S1 normal and S2 normal. Murmur heard.   Pulmonary:      Effort: He is intubated.      Breath sounds: No decreased breath sounds, wheezing, rhonchi or rales.   Abdominal:      General: Abdomen is protuberant.      Palpations: Abdomen is soft.   Musculoskeletal:      Cervical back: Neck  supple.      Right lower leg: Edema present.      Left lower leg: Edema present.      Comments: Significant body wall edema.   Skin:     Coloration: Skin is jaundiced.   Neurological:      Comments: Opens eyes; does not follow commands            Vents:  Vent Mode: A/C (11/22/24 1522)  Ventilator Initiated: Yes (11/21/24 0435)  Set Rate: 22 BPM (11/22/24 1522)  Vt Set: 420 mL (11/22/24 1522)  PEEP/CPAP: 5 cmH20 (11/22/24 1522)  Oxygen Concentration (%): 40 (11/22/24 1522)  Peak Airway Pressure: 31 cmH20 (11/22/24 1522)  Plateau Pressure: 0 cmH20 (11/22/24 1522)  Total Ve: 10.9 L/m (11/22/24 1522)  Negative Inspiratory Force (cm H2O): 0 (11/22/24 1522)  F/VT Ratio<105 (RSBI): (!) 54.55 (11/22/24 1522)  Lines/Drains/Airways       Central Venous Catheter Line  Duration             Trialysis (Dialysis) Catheter 11/21/24 1806 right internal jugular <1 day              Drain  Duration                  Urethral Catheter 11/19/24 1847 16 Fr. 2 days         NG/OG Tube 11/21/24 0130 [place] sump 16 Fr. Right mouth 1 day              Airway  Duration                  Airway - Non-Surgical 11/21/24 0120 Endotracheal Tube 1 day              Arterial Line  Duration             Arterial Line 11/21/24 2250 Left Radial <1 day              Peripheral Intravenous Line  Duration                  Midline Catheter - Single Lumen 11/20/24 1152 Right cephalic vein (lateral side of arm) 20g x 8cm 2 days         Peripheral IV - Single Lumen 11/20/24 0420 20 G Anterior;Right Forearm 2 days         Peripheral IV - Single Lumen 11/21/24 0547 20 G No Right Hand 1 day         Peripheral IV - Single Lumen 11/21/24 0549 18 G Left Hand 1 day                  Significant Labs:    CBC/Anemia Profile:  Recent Labs   Lab 11/20/24  1826 11/21/24  0910 11/22/24  0223   WBC  --  10.77 17.13*   HGB  --  8.9* 9.9*   HCT 31* 24.4* 28.6*   PLT  --  153 188   MCV  --  91 94   RDW  --  27.0* 26.6*        Chemistries:  Recent Labs   Lab 11/21/24  8613  11/22/24  0223   * 122*   K 3.4* 3.8   CL 83* 83*   CO2 24 23   BUN 28* 32*   CREATININE 2.6* 3.6*   CALCIUM 8.4* 8.5*   ALBUMIN 2.1* 2.3*   PROT 6.5 7.2   BILITOT 24.3* 22.7*   ALKPHOS 187* 204*   ALT 63* 104*   * 314*   MG 2.1 2.1   PHOS 3.7 4.8*       All pertinent labs within the past 24 hours have been reviewed.    Significant Imaging:  I have reviewed all pertinent imaging results/findings within the past 24 hours.    ABG  Recent Labs   Lab 11/21/24  0506   PH 7.422   PO2 56*   PCO2 48.9*   HCO3 31.9*   BE 7*     Assessment/Plan:     Psychiatric  Alcohol withdrawal syndrome, with delirium  Complicated by hepatic encephalopathy    --CIWA      Pulmonary  On mechanically assisted ventilation  Intubated for airway protection. Unable to tolerate bipap 2/2 mental status.  Vent bundle in place    Acute hypoxemic respiratory failure  Patient with Hypoxic Respiratory failure which is Acute.  he is not on home oxygen. Supplemental oxygen was provided and noted- Vent Mode: A/C  Oxygen Concentration (%):  [40] 40  Resp Rate Total:  [22 br/min-29 br/min] 25 br/min  Vt Set:  [420 mL] 420 mL  PEEP/CPAP:  [5 cmH20] 5 cmH20  Mean Airway Pressure:  [11 cmH20-15 cmH20] 15 cmH20    .   Signs/symptoms of respiratory failure include- respiratory distress, lethargy, and cyanosis. Contributing diagnoses includes - Aspiration Labs and images were reviewed. Patient Has recent ABG, which has been reviewed. Will treat underlying causes and adjust management of respiratory failure as follows:    Patient was stable on Vapotherm at OSH with 50% FiO2; thought to be 2/2 aspiration pneumonia. CXR with concern for pulmonary edema. Echo 11/18 with normal diastolic function. Likely vascular congestion 2/2 portal hypertension. Intubated for airway protection. Appears to have severe pulmonary edema.    - Trial of Lasix on 11/22, with poor urine output.   --Vent bundle in place  --Wean FiO2 for SpO2>92%  --Broad-spectrum antibiotics,  Vanc/Zosyn  - Continue low tidal volume lung protective ventilation.       Cardiac/Vascular  Hypertension  On vasopressor support Goal SBP >100.     Renal/  JOHAN (acute kidney injury)  Worsening JOHAN in setting of HRS. Cr 3.6 today.  Cr was normal on initial presentation to OSH. Notable hyponatremia; anasarca.  Gave trial of IV lasix 120mg x1 on 11/21. Pt had minimal urine output (640cc).     - Nephrology consulted, reported severe HRS. Family does not wish to initiate dialysis.   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 Transferred here for liver transplant evaluation.  MELD 3.0: 39 at 11/22/2024  2:23 AM  MELD-Na: 39 at 11/22/2024  2:23 AM  Calculated from:  Serum Creatinine: 3.6 mg/dL (Using max of 3 mg/dL) at 11/22/2024  2:23 AM  Serum Sodium: 122 mmol/L (Using min of 125 mmol/L) at 11/22/2024  2:23 AM  Total Bilirubin: 22.7 mg/dL at 11/22/2024  2:23 AM  Serum Albumin: 2.3 g/dL at 11/22/2024  2:23 AM  INR(ratio): 2 at 11/22/2024  2:23 AM  Age at listing (hypothetical): 39 years  Sex: Male at 11/22/2024  2:23 AM     --Hepatology reported unfortunately not a candidate for liver transplantation at this time given recurrent alcohol use despite knowledge of liver disease  --Lactulose; rifaximin  --Liver US w/ doppler with hepatomegaly and signs of portal hypertension including reversed flow in the right anterior portal vein,   -- LFTS worsening  -- Family opted for no further escalation of care today. Family plans to try to visit with him tomorrow and then consider termininal extubation    Acute hepatic encephalopathy  Complicated by ETOH withdrawal per OSH    --Lactulose  --[facility]       Critical Care Daily Checklist:    A: Awake: RASS Goal/Actual Goal: RASS Goal: -2-->light sedation  Actual:     B: Spontaneous  Breathing Trial Performed? Spon. Breathing Trial Initiated?: Not initiated (11/22/24 1522)   C: SAT & SBT Coordinated?  No                      D: Delirium: CAM-ICU Overall CAM-ICU: Positive   E: Early Mobility Performed? No   F: Feeding Goal: Goals: Meet % EEN, EPN by RD f/u date  Status: Nutrition Goal Status: new   Current Diet Order   Procedures    Diet NPO      AS: Analgesia/Sedation Precedex, Fentanyl    T: Thromboembolic Prophylaxis heparin   H: HOB > 300 Yes   U: Stress Ulcer Prophylaxis (if needed) pantoprazole   G: Glucose Control none   B: Bowel Function Stool Occurrence: 1   I: Indwelling Catheter (Lines & Mccoy) Necessity 4 PIV, 1 trialysis, 1 midline, 1 arterial line   D: De-escalation of Antimicrobials/Pharmacotherapies Discontinued propofol     Plan for the day/ETD No further escalation of care. SBP goal >100    Code Status:  Family/Goals of Care: DNR         Critical secondary to Patient has a condition that poses threat to life and bodily function: HRS, decompensated cirrhosis     Critical care was time spent personally by me on the following activities: development of treatment plan with patient or surrogate and bedside caregivers, discussions with consultants, evaluation of patient's response to treatment, examination of patient, ordering and performing treatments and interventions, ordering and review of laboratory studies, ordering and review of radiographic studies, pulse oximetry, re-evaluation of patient's condition. This critical care time did not overlap with that of any other provider or involve time for any procedures.     Laura Mckeon MD  Critical Care Medicine  [facility] - Medical ICU

## 2024-11-22 NOTE — PROGRESS NOTES
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      Assessment/Plan:     Palliative Care  Palliative care encounter  40 y/o m, h/o obesity, PBC, newly diagnosed cirrhosis in Feb 2024 likely related to both PBC and alcohol use disorder, now admitted with fulminant liver failure, multiorgan failure, shock.    Advance Care Planning    Medicolegal  Decision-making:   -Pt does not have decision-making capacity 2/2 intubation/sedation  -Pt has not appointed a HCPOA.  Legal surrogate decision makers would be patient's parents, Billie Rosa and Liu Mendoza.    -Marital status: single  -Children: none    Advance care planning/Advance directives:  -The patient has not previously engaged in advance care planning  -Pt has no scanned advance directives in [facility]    Psychosocial  Support system:  -Spiritual: not assessed today;  The palliative care  will be consulted to assess the patient.  -Family: involved and supportive.  Pt was living with his sister and her boyfriend prior to this hospitalization.      Health status prior to this hospitalization  -Functional status: good.  Pt was able to manage ADLs  -Cognitive status: intact   -QOL: good    Prognosis:  -Time and potential for recovery:  Given worsening renal function and escalating pressor requirements, believe that patient is actively dying now from his liver failure and is at the end of life.    [facility] Discussion with pt's mother, father and stepmother:  11/22/24  -I updated the patient's family on his worsening clinical condition.  They have  "already been following along in the chart and had seen his kidney function worsening and had an idea of what this would mean for the patient.  We discussed next steps and what various approaches could look like.  I strongly recommended against initiating hemodialysis given that this would be a bridge to nowhere for this patient and they are in agreement.  We also discussed that the patient seems to be approaching the end of his life no matter what we do.  At this point, it is really more a question of what this time looks like.  Family would like the opportunity for others to come in and see the patient over the next 24 hours.  Mother is coming in this afternoon and other families are coming by tomorrow.  After everybody has had an opportunity to say goodbye, a tentative plan would be to then transition patient off of life-sustaining treatments including the ventilator.  Family asked questions about what his prognosis would be in that situation and I suggested likely hours to days.  In the meantime we are all in agreement that if his condition were to worsen, we would not escalate care further and in particular, when his heart stops we are not going to intervene and instead ensure that he has a natural and peaceful death.  Everyone aware that while I suspect that patient will live through the weekend so that everyone has their opportunity to say goodbye, his condition is unpredictable and he could surprise us and pass away sooner.  11/21/24  -I met with the pt's family at bedside.  They were able to provide lots of helpful information about Isidoro's life.  He was described as "one of the sweetest, kindest people" in the world.  He has protected his family from much of the information about his illness as he has wanted to protect them from the bad news and hasn't want to burden anyone.  Isidoro was a young man with lots of potential, athletic, did well in school and went through a difficult break-up at age 23 and family " describes him falling apart at this point and they feel that this is when he started on the trajectory that led him to this point today.  His community narrowed considerably to family during this timeframe.  He and his mother have remained very close - speaking multiple times/day - but his interaction with other family and friends has been more limited.      Family struggling with feelings of guilt - feeling that there's something they could have done to intervene as pt's weight and drinking worsened.  I assured them that addiction is a very difficult disease to treat and that it is almost impossible to force a patient who is unwilling into therapy and rehab.      Family has a very good understanding of the clinical details of Will's case.  They seem relatively accepting of the news that he is unlikely to be a transplant candidate.  They have received wildly different information from doctors at the prior hospital and here about patient's prognosis so there is some frustration and confusion surrounding that issue.  They asked a lot of questions about patient's chances for recovery, next steps if he does not improve, what kind of timeframe we could be looking at in terms of prognosis.  We discussed the issue of dialysis and I told them that I would not recommend initiating dialysis should his kidney function continued to worsen, given that it would be secondary to his underlying liver disease and a bridge to know where.   They also asked questions about palliative extubation and I described at length what that process looks like including enrollment in hospice care.  Family receptive to this discussion.    All are in agreement that they would not want to see him suffer at the end of life.  At the same time, everybody is wondering whether there is any potential at all for patient to make any improvement at this stage of his illness before re-focusing exclusively on comfort-focused treatment.  We briefly discussed the  issue of code status as well.  I made a strong recommendation against intervening should patient's heart stopped during his time in the ICU.  Family going to talk this over.    Active symptoms  None at this time    Summary/Recommendation:  -Most important goals at this time:  Giving family an opportunity to visit with the patient and say their goodbyes.  No further prolongation of dying process once everybody has had the opportunity to visit with the patient  -No further escalation of care planned and patient now DNR  -Patient is Cheondoism, spiritual needs have already been met  -Please liberalize visiting rules over the next day so that all family members can have the opportunity to be with him  -Most appropriate disposition:  Continue with the current level of care but do not add additional interventions should his condition deteriorate.        I will follow-up with patient. Please contact us if you have any additional questions.    Subjective:     Chief Complaint: No chief complaint on file.      HPI:   Marcellus Mendoza is a 39 y.o. male with history of alcohol use disorder, PBC, BMI >50, and cirrhosis who presents as a transfer from [facility] for Hepatology/transplant evaluation in setting of decompensated cirrhosis. Patient initially presented to OSH with complaint of worsening shortness of breath and lower extremity swelling over the past couple weeks. He had difficulty refilling his ursodiol and furosemide so he had not been taking either of those for the last couple of months. Labs at the initial facility revealed T-bili 20, INR 2, and MELD score 30. He was then transferred to [facility] for further management. There he was started IV lasix + rocephin for SBP prevention with Lactulose + Rifaximin added to regimen. Course was complicated by increased work of breathing and worsening mental status, so decision was made to transfer patient to [facility] and admitted to MICU for higher level of care.  Patient was intubated for airway protection prior to arrival given worsening mental status and concern for an aspiration event.  On arrival to the ICU at [facility], pt noted to be in shock, requiring pressors.  Hepatology consulted and likelihood of transplant very low.  PC consulted for assistance with GOC.     Liver history:  Presented to ED with jaundice and leg swelling in early 2024 and was subsequently sent to GI for further evaluation. Was seen in Feb 2024 and at that time was 3 weeks sober and had attended intensive outpatient program for alcohol rehab. Previously was drinking a fifth of schnapps daily per GI documentation. At that time was counseled on complete alcohol cessation. Underwent workup for cirrhosis and had biopsy performed in April that showed cirrhosis with lobular granuloma as well as positive AMA. Was told of PBC diagnosis at that time. Patient was unfortunately lost to follow-up for unclear reasons, but per family, it seems that the hepatologist he was referred to ended up leaving practice so he was never successfully established with hepatology. At the same time, due to insurance issues, he was unable to afford his ursodiol and Lasix prescriptions and ended up being off of both medications for months prior to presentation. At [facility] he reportedly admitted to having drank alcohol recently up to his presentation, but family deny any knowledge of him drinking alcohol or reporting alcohol use to them.    Hospital Course:  No notes on file    Interval history:  -worsening renal failure and urine output.  -escalating pressor requirements    Past Medical History:   Diagnosis Date    Alcohol abuse     Anasarca     GERD (gastroesophageal reflux disease)     Hypertension     Obesity, unspecified     Unspecified cirrhosis of liver     Vitamin D deficiency        Past Surgical History:   Procedure Laterality Date    ORIF, FRACTURE, ANKLE, BIMALLEOLAR Right        Review of patient's allergies indicates:  No  Known Allergies    Medications:  Continuous Infusions:   dexmedeTOMIDine (Precedex) infusion (titrating)  0-1.4 mcg/kg/hr Intravenous Continuous        fentanyl  0-300 mcg/hr Intravenous Continuous 10 mL/hr at 24 1100 100 mcg/hr at 24    NORepinephrine bitartrate-D5W  0-3 mcg/kg/min Intravenous Continuous 37.6 mL/hr at 24 1100 0.48 mcg/kg/min at 24 1100    propofoL  0-50 mcg/kg/min Intravenous Continuous 10 mL/hr at 24 1100 10 mcg/kg/min at 24 1100    vasopressin  0.04 Units/min Intravenous Continuous         Scheduled Meds:   chlorhexidine  15 mL Mouth/Throat BID    heparin (porcine)  7,500 Units Subcutaneous Q8H    lactulose  20 g Per OG tube Q8H    mupirocin   Nasal BID    pantoprazole  40 mg Intravenous Daily    phytonadione vitamin k (AQUA-MEPHYTON) 10 mg in D5W 50 mL IVPB  10 mg Intravenous Daily    piperacillin-tazobactam (Zosyn) IV (PEDS and ADULTS) (extended infusion is not appropriate)  4.5 g Intravenous Q8H    rifAXImin  550 mg Per OG tube BID    ursodiol  600 mg Per OG tube TID     PRN Meds:  Current Facility-Administered Medications:     fentanyl, 50 mcg, Intravenous, Q1H PRN    sodium chloride 0.9%, 10 mL, Intravenous, PRN    Pharmacy to dose Vancomycin consult, , , Once **AND** vancomycin - pharmacy to dose, , Intravenous, pharmacy to manage frequency    Family History       Problem Relation (Age of Onset)    Breast cancer Mother    Diabetes Father          Tobacco Use    Smoking status: Former     Current packs/day: 0.00     Types: Cigarettes     Quit date: 2023     Years since quittin.3    Smokeless tobacco: Never   Substance and Sexual Activity    Alcohol use: Not Currently     Alcohol/week: 5.0 standard drinks of alcohol     Types: 5 Drinks containing 0.5 oz of alcohol per week     Comment: reports last drink     Drug use: Not Currently    Sexual activity: Yes       Review of Systems  Objective:     Vital Signs (Most Recent):  Temp: 98.5  °F (36.9 °C) (11/22/24 0730)  Pulse: (!) 113 (11/22/24 1000)  Resp: (!) 24 (11/22/24 1000)  BP: 129/60 (11/21/24 2300)  SpO2: (!) 94 % (11/22/24 1000) Vital Signs (24h Range):  Temp:  [98.5 °F (36.9 °C)-99.6 °F (37.6 °C)] 98.5 °F (36.9 °C)  Pulse:  [] 113  Resp:  [20-38] 24  SpO2:  [89 %-100 %] 94 %  BP: (102-156)/(49-66) 129/60  Arterial Line BP: (115-176)/(39-57) 126/39     Weight: (!) 167.2 kg (368 lb 9.8 oz)  Body mass index is 52.89 kg/m².       Physical Exam  Vitals and nursing note reviewed.   Constitutional:       Appearance: He is morbidly obese. He is ill-appearing.      Interventions: He is sedated and intubated.   Eyes:      General: Scleral icterus present.   Pulmonary:      Effort: He is intubated.      Comments: On ventilator  Musculoskeletal:         General: Swelling present.   Skin:     Coloration: Skin is jaundiced.      Comments: jaundiced   Neurological:      Comments: Sedated, unresponsive to verbal stimuli            Review of Symptoms      Symptom Assessment (ESAS 0-10 Scale)  Unable to complete assessment due to Intubated         Pain Assessment in Advanced Demential Scale (PAINAD)   Breathing - Independent of vocalization:  0  Negative vocalization:  0  Facial expression:  0  Body language:  0  Consolability:  0  Total:  0    Living Arrangements:  Lives with family    Psychosocial/Cultural:   See Palliative Psychosocial Note: Yes  **Primary  to Follow**  Palliative Care  Consult: Yes        Advance Care Planning  Advance Directives:   Living Will: No    LaPOST: No    Do Not Resuscitate Status: No    Medical Power of : No      Decision Making:  Patient unable to communicate due to disease severity/cognitive impairment and Family answered questions  Goals of Care: Still being determined         CBC:   Recent Labs   Lab 11/22/24 0223   WBC 17.13*   HGB 9.9*   HCT 28.6*   MCV 94        BMP:  Recent Labs   Lab 11/22/24 0223   GLU 83   *   K  3.8   CL 83*   CO2 23   BUN 32*   CREATININE 3.6*   CALCIUM 8.5*   MG 2.1     LFT:  Lab Results   Component Value Date     (H) 11/22/2024    ALKPHOS 204 (H) 11/22/2024    BILITOT 22.7 (H) 11/22/2024     Albumin:   Albumin   Date Value Ref Range Status   11/22/2024 2.3 (L) 3.5 - 5.2 g/dL Final     Protein:   Total Protein   Date Value Ref Range Status   11/22/2024 7.2 6.0 - 8.4 g/dL Final     Lactic acid:   Lab Results   Component Value Date    LACTATE 1.0 11/20/2024    LACTATE 1.4 11/19/2024         Perlita Pop MD  Palliative Medicine  [facility] - Medical ICU

## 2024-11-22 NOTE — EICU
Intervention Initiated From:  Bedside    Carlin intervened regarding:  Time-Out    Comments: elert from bedside staff for timeout for a arterial line placement per Dr.K. Coleman.  See timeout flowsheet.  22:53-pt tolerated procedure well.

## 2024-11-22 NOTE — PROGRESS NOTES
Pharmacokinetic Assessment Follow Up: IV Vancomycin    Vancomycin Regimen Assessment & Plan  - Vancomycin level drawn with morning labs today resulted as 24.1 mcg/mL, goal trough 10-20 mcg/mL.  - Nephrology following for JOHAN. Will continue pulse dosing by levels.  - No need for vancomycin dose today given level. Draw vancomycin level with morning labs tomorrow. Will re-dose as needed depending on level and renal function.    Drug levels (last 3 results):  Recent Labs   Lab Result Units 11/21/24  0910 11/22/24  0223   Vancomycin, Random ug/mL 11.8 24.1     Pharmacy will continue to follow and monitor vancomycin.    Please contact pharmacy at extension [phone] for questions regarding this assessment.    Thank you for the consult,   Roney Thibodeaux    Patient brief summary:  Marcellus Mendoza is a 39 y.o. male initiated on antimicrobial therapy with IV Vancomycin for treatment of sepsis    Drug Allergies:   Review of patient's allergies indicates:  No Known Allergies    Actual Body Weight:   167.2 kg    Renal Function:   Estimated Creatinine Clearance: 44 mL/min (A) (based on SCr of 3.6 mg/dL (H)).,     Dialysis Method (if applicable):  N/A

## 2024-11-22 NOTE — EICU
Intervention Initiated From:  Bedside    Carlin intervened regarding:  Documentation and Time-Out    Nurse Notified:  Yes    Doctor Notified:  Yes    Comments: Called to room to assist w/ Time Out and Documentation for Arterial Catheter (Radial) and Trialysis Catheter placement. Procedures completed without issue, Xray ordered for placement confirmation, and LDA/ Time Out complete.

## 2024-11-23 NOTE — PROGRESS NOTES
Pharmacokinetic Assessment Follow Up: IV Vancomycin    Vancomycin Regimen Assessment & Plan  - Vancomycin level drawn with morning labs today resulted as 20.2 mcg/mL, goal trough 10-20 mcg/mL.  - Nephrology following for JOHAN. Will continue pulse dosing by levels.  - No need for vancomycin dose today given level. Draw vancomycin level with morning labs tomorrow. Will re-dose as needed depending on level and renal function.    Drug levels (last 3 results):  Recent Labs   Lab Result Units 11/21/24  0910 11/22/24  0223 11/23/24  0340   Vancomycin, Random ug/mL 11.8 24.1 20.2     Pharmacy will continue to follow and monitor vancomycin.    Please contact pharmacy at extension [phone] for questions regarding this assessment.    Thank you for the consult,   Ella Grant    Patient brief summary:  Marcellus Mendoza is a 39 y.o. male initiated on antimicrobial therapy with IV Vancomycin for treatment of sepsis    Drug Allergies:   Review of patient's allergies indicates:  No Known Allergies    Actual Body Weight:   167.2 kg    Renal Function:   Estimated Creatinine Clearance: 25.1 mL/min (A) (based on SCr of 6.3 mg/dL (H)).,     Dialysis Method (if applicable):  N/A

## 2024-11-23 NOTE — PLAN OF CARE
MICU DAILY GOALS     Family/Goals of care/Code Status   Code Status: DNR    24H Vital Sign Range  Temp:  [98.5 °F (36.9 °C)-100.2 °F (37.9 °C)]   Pulse:  []   Resp:  [21-35]   SpO2:  [91 %-95 %]   Arterial Line BP: (111-150)/(33-42)      Shift Events (include procedures and significant events)   No acute events throughout shift. Plan to transition to comfort care later today.     AWAKE RASS: Goal - RASS Goal: -3-->moderate sedation  Actual - RASS ([place]oca Agitation-Sedation Scale): restless    Restraint necessity: Removing medical devices   BREATHE SBT: Not attempted    Coordinate A & B, analgesics/sedatives Pain: managed   SAT: Not attempted   Delirium CAM-ICU: Overall CAM-ICU: Positive   Early(intubated/ Progressive (non-intubated) Mobility MOVE Screen (INTUBATED ONLY): Not attempted    Activity: Activity Management: Patient unable to perform activities   Feeding/Nutrition Diet order: Diet/Nutrition Received: NPO,     Thrombus DVT prophylaxis: VTE Core Measure: Pharmacological prophylaxis initiated/maintained   HOB Elevation Head of Bed (HOB) Positioning: HOB at 30-45 degrees   Ulcer Prophylaxis GI: yes   Glucose control managed     Skin Skin assessment:     Sacrum intact/not altered? Yes  Heels intact/not altered? Yes  Surgical wound? No    CHECK ONE!   (no altered skin or altered skin) and sub boxes:  [x] No Altered Skin Integrity Present    [x]Prevention Measures Documented    [] Altered Skin Integrity Present or Discovered   [] LDA present in EPIC, daily doc completed              [] LDA added if not in EPIC (describe wound).                    When describing wound, do not stage, use descriptive words only.    [] Wound Image Taken (required on admit,                   transfer/discharge and every Tuesday)    Wound Care Consulted? No   Bowel Function no issues    Indwelling Catheter Necessity      Urethral Catheter 11/19/24 1847 16 Fr.-Reason for Continuing Urinary Catheterization: Critically ill in ICU  and requiring hourly monitoring of intake/output    Trialysis (Dialysis) Catheter 11/21/24 1124 right internal jugular-Line Necessity Review: Hemodynamic instability     De-escalation Antibiotics Yes        VS and assessment per flow sheet, patient progressing towards goals as tolerated, plan of care reviewed with family, all concerns addressed, will continue to monitor.

## 2024-11-23 NOTE — PLAN OF CARE
Death Note      Called to bedside on 11/23/2024  at 13:39   by patient's nurse. Nursing at beside, nursing supervisor notified.  has been notified. Family at bedside.     Patient is not responding to verbal or tactile stimuli.   Patient does not have a pupillary or corneal reflex.   Patient's pupils are fixed and dilated.   No heart or breath sounds on auscultation.   No respirations.   No palpable pulses.     Time of death is 11/23/2024  at 13:39        Cause of Death: acute on chronic liver failure       Signing Physician:    Pamela Cohn DO   [facility]  Email: [email or web address]  [email or web address]

## 2024-11-23 NOTE — DISCHARGE SUMMARY
[facility] - Medical ICU  Critical Care Medicine  Discharge Summary      Patient Name: Marcellus Mendoza  MRN: [id number]  Admission Date: 11/21/2024  Hospital Length of Stay: 2 days  Discharge Date and Time:  11/23/2024 1:55 PM  Attending Physician: Geni Peacock MD   Discharging Provider: Laura Mckeon MD  Primary Care Provider: No, Primary Doctor  Reason for Admission: Acute of chronic liver failure    HPI:   Mr. Mendoza is a 39-year-old male with past medical history of primary biliary cholangitis with cirrhosis, ETOH abuse, anasarca, GERD, HTN, morbid obesity, vitamin D deficiency who initially presented to [facility] with dyspnea and concern for acute heart failure exacerbation and decompensated cirrhosis. He was transferred to [facility] for higher level of care on 11/17 and now to [facility] for liver transplant evaluation. While at [facility], noted to be in ETOH withdrawal complicated by respiratory failure 2/2 aspiration. He was stable on Vapotherm but with worsening mental status- unable to tolerate bipap with concern for airway. He was intubated for airway protection prior to transport to [facility].      Transferred to [facility] 11/21 for liver transplant evaluation with decompensated cirrhosis, heart failure with subsequent respiratory failure and ETOH withdrawal.    * No surgery found *    Indwelling Lines/Drains at Time of Discharge:   Lines/Drains/Airways       Central Venous Catheter Line  Duration             Trialysis (Dialysis) Catheter 11/21/24 1806 right internal jugular 1 day              Drain  Duration                  Urethral Catheter 11/19/24 1847 16 Fr. 3 days         NG/OG Tube 11/21/24 0130 Tyrrell sump 16 Fr. Right mouth 2 days              Airway  Duration                  Airway - Non-Surgical 11/21/24 0120 Endotracheal Tube 2 days              Arterial Line  Duration             Arterial Line 11/21/24 2250 Left Radial 1 day                  Hospital Course:   Patient  "transferred to [facility] for evaluation by liver transplant team. Maintained on vent and requiring pressors. Nephrology, Hepatology and Palliative care were consulted. Hepatology reported that he was unfortunately not a candidate for liver transplantation given recurrent alcohol use despite knowledge of liver disease, and barriers to transplantation including BMI. Nephrology suspected HRS and recommended MAP goal >85, but unable to meet goal despite increasing pressors. Patient did not respond well to lasix trial with poor urine output and worsening JOHAN. On , palliative care team discussed condition in depth with parents who are his legal decision makers and decided to make patient DNR. On , family decided to transition to comfort and opted to discontinue pressors. Family at beside with patient. Patient  at 1339 on 24 after discontinuation of pressors.    Consults (From admission, onward)          Status Ordering Provider     Pharmacy to dose Vancomycin consult  Once        Provider:  (Not yet assigned)   Placed in "And" Linked Group    Acknowledged NEGAR TEAGUE     Inpatient consult to Palliative Care  Once        Provider:  (Not yet assigned)    Completed ADRIA CABALLERO     Inpatient consult to Nephrology  Once        Provider:  (Not yet assigned)    Completed ADRIA CABALLERO     Inpatient consult to Hepatology  Once        Provider:  (Not yet assigned)    Completed NEGAR TEAGUE     Inpatient consult to Registered Dietitian/Nutritionist  Once        Provider:  (Not yet assigned)    Completed NEGAR TEAGUE          Significant Labs:  All pertinent labs within the past 24 hours have been reviewed.    Significant Imaging:  I have reviewed all pertinent imaging results/findings within the past 24 hours.    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 Alcoholic Cirrhosis [K74.3] 2024 Yes     Chronic    On mechanically assisted ventilation [Z99.11] 2024 Not Applicable    JOHAN (acute kidney injury) [N17.9] 2024 Yes    Alcohol withdrawal syndrome, with delirium [F10.931] 2024 Yes    Palliative care encounter [Z51.5] 2024 Not Applicable    Acute hypoxemic respiratory failure [J96.01] 2024 Yes    Hypertension [I10] 2024 Yes     Chronic    Acute hepatic encephalopathy [K76.82] 2024 Yes      Problems Resolved During this Admission:    Diagnosis Date Noted Date Resolved POA    GERD (gastroesophageal reflux disease) [K21.9] 2024 Yes     Chronic       Discharged Condition:     Disposition:       Patient Instructions:   No discharge procedures on file.  Medications:  None     Laura Mckeon MD  Critical Care Medicine  [facility] - Medical ICU

## 2024-11-25 LAB
BACTERIA BLD CULT: NORMAL
BACTERIA BLD CULT: NORMAL

## 2024-11-26 ENCOUNTER — TELEPHONE (OUTPATIENT)
Dept: TRANSPLANT | Facility: CLINIC | Age: 39
End: 2024-11-26
Payer: COMMERCIAL

## 2024-11-26 LAB
CLINICAL BIOCHEMIST REVIEW: NORMAL
PLPETH BLD-MCNC: 905 NG/ML
POPETH BLD-MCNC: NORMAL NG/ML

## 2024-11-26 NOTE — TELEPHONE ENCOUNTER
----- Message from Bennie Hernandez MD sent at 5/17/2023  4:27 PM CDT -----  Glucose nl.   Cr nl.   Lipid panel nl.   Tsh nl.   Cbc nl.   Psa nl.   Liver Transplant Committee Discussion     Patient Name: Marcellus Mendoza   : 1985  MRN: [id number]    Requested by: Anuel Madrigal MD    Day to be discussed: Liver discussion days: Friday    Transplant Coordinator: Liver Coordinators: None    Patient Status: inpatient    Transplant Status: transplant status: Hepatology    Reason for Discussion: discuss transplant candidacy, PMH AUD, PBC dx'd 2024, DMI 53, pt is currently intubated on sedation and pressors      Plan: pt deemed not a txp candidate, will likely transition to hospice

## 2025-02-02 NOTE — ASSESSMENT & PLAN NOTE
- Patient with known Cirrhosis with Child's class C. Co-morbidities are present and inclusive of ascites and hepatic encephalopathy.  MELD-Na score calculated; MELD 3.0: 35 at 11/20/2024  2:34 AM  MELD-Na: 35 at 11/20/2024  2:34 AM  Calculated from:  Serum Creatinine: 1.9 mg/dL at 11/20/2024  2:26 AM  Serum Sodium: 124 mmol/L (Using min of 125 mmol/L) at 11/20/2024  2:26 AM  Total Bilirubin: 24.4 mg/dL at 11/20/2024  2:26 AM  Serum Albumin: 2.2 g/dL at 11/20/2024  2:26 AM  INR(ratio): 1.8 at 11/20/2024  2:34 AM  Age at listing (hypothetical): 39 years  Sex: Male at 11/20/2024  2:34 AM  - Continue chronic meds. Etiology likely ETOH. Will avoid any hepatotoxic meds, and monitor CBC/CMP/INR for synthetic function.   - Decompensation overnight, transferred over to ICU  - Continue lactulose + rifaxamin, vitamin supplementation   - No significant ascites on abdominal US, no indication for para at this time  - Continue IV diuresis  - Monitor coag studies and LFTS  - Hepatology following, agree with transfer to main campus for tranplant involvement    02-Feb-2025 21:55